# Patient Record
Sex: MALE | Race: WHITE | Employment: UNEMPLOYED | URBAN - METROPOLITAN AREA
[De-identification: names, ages, dates, MRNs, and addresses within clinical notes are randomized per-mention and may not be internally consistent; named-entity substitution may affect disease eponyms.]

---

## 2021-12-28 ENCOUNTER — HOSPITAL ENCOUNTER (OUTPATIENT)
Dept: LAB | Age: 76
Discharge: HOME | End: 2021-12-28
Payer: OTHER GOVERNMENT

## 2021-12-28 ENCOUNTER — TRANSCRIBE ORDER (OUTPATIENT)
Dept: REGISTRATION | Age: 76
End: 2021-12-28

## 2021-12-28 ENCOUNTER — HOSPITAL ENCOUNTER (OUTPATIENT)
Dept: NON INVASIVE DIAGNOSTICS | Age: 76
Discharge: HOME | End: 2021-12-28
Payer: OTHER GOVERNMENT

## 2021-12-28 DIAGNOSIS — D50.9 IRON DEFICIENCY ANEMIA, UNSPECIFIED: ICD-10-CM

## 2021-12-28 DIAGNOSIS — D50.9 IRON DEFICIENCY ANEMIA, UNSPECIFIED: Primary | ICD-10-CM

## 2021-12-28 DIAGNOSIS — I25.9 CHRONIC ISCHEMIC HEART DISEASE, UNSPECIFIED: ICD-10-CM

## 2021-12-28 DIAGNOSIS — I25.9 CHRONIC ISCHEMIC HEART DISEASE, UNSPECIFIED: Primary | ICD-10-CM

## 2021-12-28 LAB
ATRIAL RATE: 78 BPM
BASOPHILS # BLD: 0 K/UL (ref 0–0.1)
BASOPHILS NFR BLD: 1 % (ref 0–1)
CALCULATED P AXIS, ECG09: 10 DEGREES
CALCULATED R AXIS, ECG10: -39 DEGREES
CALCULATED T AXIS, ECG11: 13 DEGREES
DIAGNOSIS, 93000: NORMAL
DIFFERENTIAL METHOD BLD: NORMAL
EOSINOPHIL # BLD: 0.2 K/UL (ref 0–0.4)
EOSINOPHIL NFR BLD: 2 % (ref 0–7)
ERYTHROCYTE [DISTWIDTH] IN BLOOD BY AUTOMATED COUNT: 13.1 % (ref 11.5–14.5)
HCT VFR BLD AUTO: 44 % (ref 36.6–50.3)
HGB BLD-MCNC: 14.7 G/DL (ref 12.1–17)
IMM GRANULOCYTES # BLD AUTO: 0 K/UL (ref 0–0.04)
IMM GRANULOCYTES NFR BLD AUTO: 0 % (ref 0–0.5)
LYMPHOCYTES # BLD: 1.4 K/UL (ref 0.8–3.5)
LYMPHOCYTES NFR BLD: 17 % (ref 12–49)
MCH RBC QN AUTO: 32 PG (ref 26–34)
MCHC RBC AUTO-ENTMCNC: 33.4 G/DL (ref 30–36.5)
MCV RBC AUTO: 95.7 FL (ref 80–99)
MONOCYTES # BLD: 0.7 K/UL (ref 0–1)
MONOCYTES NFR BLD: 9 % (ref 5–13)
NEUTS SEG # BLD: 5.6 K/UL (ref 1.8–8)
NEUTS SEG NFR BLD: 71 % (ref 32–75)
NRBC # BLD: 0 K/UL (ref 0–0.01)
NRBC BLD-RTO: 0 PER 100 WBC
P-R INTERVAL, ECG05: 170 MS
PLATELET # BLD AUTO: 262 K/UL (ref 150–400)
PMV BLD AUTO: 10.9 FL (ref 8.9–12.9)
Q-T INTERVAL, ECG07: 402 MS
QRS DURATION, ECG06: 84 MS
QTC CALCULATION (BEZET), ECG08: 458 MS
RBC # BLD AUTO: 4.6 M/UL (ref 4.1–5.7)
VENTRICULAR RATE, ECG03: 78 BPM
WBC # BLD AUTO: 8 K/UL (ref 4.1–11.1)

## 2021-12-28 PROCEDURE — 36415 COLL VENOUS BLD VENIPUNCTURE: CPT

## 2021-12-28 PROCEDURE — 93005 ELECTROCARDIOGRAM TRACING: CPT

## 2021-12-28 PROCEDURE — 85025 COMPLETE CBC W/AUTO DIFF WBC: CPT

## 2024-12-09 ENCOUNTER — HOSPITAL ENCOUNTER (INPATIENT)
Facility: HOSPITAL | Age: 79
LOS: 3 days | Discharge: NURSING, REHAB OR LONG-TERM CARE | End: 2024-12-13
Attending: EMERGENCY MEDICINE | Admitting: STUDENT IN AN ORGANIZED HEALTH CARE EDUCATION/TRAINING PROGRAM
Payer: MEDICARE

## 2024-12-09 ENCOUNTER — APPOINTMENT (OUTPATIENT)
Facility: HOSPITAL | Age: 79
End: 2024-12-09
Payer: MEDICARE

## 2024-12-09 DIAGNOSIS — R07.9 CHEST PAIN, UNSPECIFIED TYPE: ICD-10-CM

## 2024-12-09 DIAGNOSIS — I95.9 HYPOTENSION, UNSPECIFIED HYPOTENSION TYPE: Primary | ICD-10-CM

## 2024-12-09 DIAGNOSIS — E86.0 DEHYDRATION: ICD-10-CM

## 2024-12-09 DIAGNOSIS — R10.13 ABDOMINAL PAIN, EPIGASTRIC: ICD-10-CM

## 2024-12-09 LAB
ALBUMIN SERPL-MCNC: 3 G/DL (ref 3.5–5)
ALBUMIN/GLOB SERPL: 0.9 (ref 1.1–2.2)
ALP SERPL-CCNC: 107 U/L (ref 45–117)
ALT SERPL-CCNC: 43 U/L (ref 12–78)
ANION GAP SERPL CALC-SCNC: 6 MMOL/L (ref 2–12)
APPEARANCE UR: CLEAR
AST SERPL-CCNC: 54 U/L (ref 15–37)
BACTERIA URNS QL MICRO: NEGATIVE /HPF
BASOPHILS # BLD: 0 K/UL (ref 0–0.1)
BASOPHILS NFR BLD: 1 % (ref 0–1)
BILIRUB SERPL-MCNC: 0.5 MG/DL (ref 0.2–1)
BILIRUB UR QL: NEGATIVE
BUN SERPL-MCNC: 22 MG/DL (ref 6–20)
BUN/CREAT SERPL: 26 (ref 12–20)
CALCIUM SERPL-MCNC: 8.6 MG/DL (ref 8.5–10.1)
CHLORIDE SERPL-SCNC: 102 MMOL/L (ref 97–108)
CO2 SERPL-SCNC: 32 MMOL/L (ref 21–32)
COLOR UR: NORMAL
CREAT SERPL-MCNC: 0.86 MG/DL (ref 0.7–1.3)
DIFFERENTIAL METHOD BLD: ABNORMAL
EOSINOPHIL # BLD: 0.2 K/UL (ref 0–0.4)
EOSINOPHIL NFR BLD: 3 % (ref 0–7)
EPITH CASTS URNS QL MICRO: NORMAL /LPF
ERYTHROCYTE [DISTWIDTH] IN BLOOD BY AUTOMATED COUNT: 14.8 % (ref 11.5–14.5)
GLOBULIN SER CALC-MCNC: 3.4 G/DL (ref 2–4)
GLUCOSE SERPL-MCNC: 93 MG/DL (ref 65–100)
GLUCOSE UR STRIP.AUTO-MCNC: NEGATIVE MG/DL
HCT VFR BLD AUTO: 34.3 % (ref 36.6–50.3)
HGB BLD-MCNC: 10.7 G/DL (ref 12.1–17)
HGB UR QL STRIP: NEGATIVE
HYALINE CASTS URNS QL MICRO: NORMAL /LPF (ref 0–2)
IMM GRANULOCYTES # BLD AUTO: 0 K/UL (ref 0–0.04)
IMM GRANULOCYTES NFR BLD AUTO: 0 % (ref 0–0.5)
KETONES UR QL STRIP.AUTO: NEGATIVE MG/DL
LACTATE BLD-SCNC: 1.63 MMOL/L (ref 0.4–2)
LEUKOCYTE ESTERASE UR QL STRIP.AUTO: NEGATIVE
LIPASE SERPL-CCNC: 16 U/L (ref 13–75)
LYMPHOCYTES # BLD: 1.5 K/UL (ref 0.8–3.5)
LYMPHOCYTES NFR BLD: 20 % (ref 12–49)
MCH RBC QN AUTO: 29.4 PG (ref 26–34)
MCHC RBC AUTO-ENTMCNC: 31.2 G/DL (ref 30–36.5)
MCV RBC AUTO: 94.2 FL (ref 80–99)
MONOCYTES # BLD: 0.8 K/UL (ref 0–1)
MONOCYTES NFR BLD: 11 % (ref 5–13)
NEUTS SEG # BLD: 4.8 K/UL (ref 1.8–8)
NEUTS SEG NFR BLD: 65 % (ref 32–75)
NITRITE UR QL STRIP.AUTO: NEGATIVE
NRBC # BLD: 0 K/UL (ref 0–0.01)
NRBC BLD-RTO: 0 PER 100 WBC
NT PRO BNP: 1290 PG/ML
PH UR STRIP: 6 (ref 5–8)
PLATELET # BLD AUTO: 317 K/UL (ref 150–400)
PMV BLD AUTO: 10.7 FL (ref 8.9–12.9)
POTASSIUM SERPL-SCNC: 3.2 MMOL/L (ref 3.5–5.1)
PROT SERPL-MCNC: 6.4 G/DL (ref 6.4–8.2)
PROT UR STRIP-MCNC: NEGATIVE MG/DL
RBC # BLD AUTO: 3.64 M/UL (ref 4.1–5.7)
RBC #/AREA URNS HPF: NORMAL /HPF (ref 0–5)
SODIUM SERPL-SCNC: 140 MMOL/L (ref 136–145)
SP GR UR REFRACTOMETRY: 1.01
TROPONIN I SERPL HS-MCNC: 33 NG/L (ref 0–76)
TROPONIN I SERPL HS-MCNC: 35 NG/L (ref 0–76)
URINE CULTURE IF INDICATED: NORMAL
UROBILINOGEN UR QL STRIP.AUTO: 1 EU/DL (ref 0.2–1)
WBC # BLD AUTO: 7.4 K/UL (ref 4.1–11.1)
WBC URNS QL MICRO: NORMAL /HPF (ref 0–4)

## 2024-12-09 PROCEDURE — 84484 ASSAY OF TROPONIN QUANT: CPT

## 2024-12-09 PROCEDURE — 83880 ASSAY OF NATRIURETIC PEPTIDE: CPT

## 2024-12-09 PROCEDURE — 74177 CT ABD & PELVIS W/CONTRAST: CPT

## 2024-12-09 PROCEDURE — 83690 ASSAY OF LIPASE: CPT

## 2024-12-09 PROCEDURE — 71045 X-RAY EXAM CHEST 1 VIEW: CPT

## 2024-12-09 PROCEDURE — 80053 COMPREHEN METABOLIC PANEL: CPT

## 2024-12-09 PROCEDURE — 2580000003 HC RX 258: Performed by: EMERGENCY MEDICINE

## 2024-12-09 PROCEDURE — 83605 ASSAY OF LACTIC ACID: CPT

## 2024-12-09 PROCEDURE — 99285 EMERGENCY DEPT VISIT HI MDM: CPT

## 2024-12-09 PROCEDURE — 36415 COLL VENOUS BLD VENIPUNCTURE: CPT

## 2024-12-09 PROCEDURE — 93005 ELECTROCARDIOGRAM TRACING: CPT | Performed by: EMERGENCY MEDICINE

## 2024-12-09 PROCEDURE — 6360000004 HC RX CONTRAST MEDICATION: Performed by: EMERGENCY MEDICINE

## 2024-12-09 PROCEDURE — 81001 URINALYSIS AUTO W/SCOPE: CPT

## 2024-12-09 PROCEDURE — 85025 COMPLETE CBC W/AUTO DIFF WBC: CPT

## 2024-12-09 RX ORDER — MIDODRINE HYDROCHLORIDE 5 MG/1
10 TABLET ORAL
Status: COMPLETED | OUTPATIENT
Start: 2024-12-09 | End: 2024-12-10

## 2024-12-09 RX ORDER — 0.9 % SODIUM CHLORIDE 0.9 %
1000 INTRAVENOUS SOLUTION INTRAVENOUS ONCE
Status: DISCONTINUED | OUTPATIENT
Start: 2024-12-09 | End: 2024-12-09

## 2024-12-09 RX ORDER — 0.9 % SODIUM CHLORIDE 0.9 %
500 INTRAVENOUS SOLUTION INTRAVENOUS ONCE
Status: COMPLETED | OUTPATIENT
Start: 2024-12-09 | End: 2024-12-09

## 2024-12-09 RX ORDER — 0.9 % SODIUM CHLORIDE 0.9 %
500 INTRAVENOUS SOLUTION INTRAVENOUS ONCE
Status: COMPLETED | OUTPATIENT
Start: 2024-12-09 | End: 2024-12-10

## 2024-12-09 RX ORDER — IOPAMIDOL 755 MG/ML
100 INJECTION, SOLUTION INTRAVASCULAR
Status: COMPLETED | OUTPATIENT
Start: 2024-12-09 | End: 2024-12-09

## 2024-12-09 RX ADMIN — IOPAMIDOL 100 ML: 755 INJECTION, SOLUTION INTRAVENOUS at 19:24

## 2024-12-09 RX ADMIN — SODIUM CHLORIDE 500 ML: 9 INJECTION, SOLUTION INTRAVENOUS at 18:19

## 2024-12-09 RX ADMIN — SODIUM CHLORIDE 500 ML: 9 INJECTION, SOLUTION INTRAVENOUS at 20:53

## 2024-12-09 ASSESSMENT — LIFESTYLE VARIABLES
HOW MANY STANDARD DRINKS CONTAINING ALCOHOL DO YOU HAVE ON A TYPICAL DAY: PATIENT DOES NOT DRINK
HOW OFTEN DO YOU HAVE A DRINK CONTAINING ALCOHOL: NEVER

## 2024-12-10 PROBLEM — I95.9 HYPOTENSION: Status: ACTIVE | Noted: 2024-12-10

## 2024-12-10 LAB
EKG ATRIAL RATE: 78 BPM
EKG DIAGNOSIS: NORMAL
EKG P AXIS: 70 DEGREES
EKG P-R INTERVAL: 178 MS
EKG Q-T INTERVAL: 454 MS
EKG QRS DURATION: 104 MS
EKG QTC CALCULATION (BAZETT): 517 MS
EKG R AXIS: -58 DEGREES
EKG T AXIS: -7 DEGREES
EKG VENTRICULAR RATE: 78 BPM

## 2024-12-10 PROCEDURE — 6360000002 HC RX W HCPCS: Performed by: STUDENT IN AN ORGANIZED HEALTH CARE EDUCATION/TRAINING PROGRAM

## 2024-12-10 PROCEDURE — 1100000003 HC PRIVATE W/ TELEMETRY

## 2024-12-10 PROCEDURE — 2580000003 HC RX 258

## 2024-12-10 PROCEDURE — 2580000003 HC RX 258: Performed by: STUDENT IN AN ORGANIZED HEALTH CARE EDUCATION/TRAINING PROGRAM

## 2024-12-10 PROCEDURE — 6370000000 HC RX 637 (ALT 250 FOR IP)

## 2024-12-10 PROCEDURE — 94761 N-INVAS EAR/PLS OXIMETRY MLT: CPT

## 2024-12-10 PROCEDURE — 6370000000 HC RX 637 (ALT 250 FOR IP): Performed by: STUDENT IN AN ORGANIZED HEALTH CARE EDUCATION/TRAINING PROGRAM

## 2024-12-10 PROCEDURE — 94640 AIRWAY INHALATION TREATMENT: CPT

## 2024-12-10 RX ORDER — ZINC GLUCONATE 50 MG
50 TABLET ORAL DAILY
COMMUNITY

## 2024-12-10 RX ORDER — ACETAMINOPHEN 650 MG/1
650 SUPPOSITORY RECTAL EVERY 6 HOURS PRN
Status: DISCONTINUED | OUTPATIENT
Start: 2024-12-10 | End: 2024-12-13 | Stop reason: HOSPADM

## 2024-12-10 RX ORDER — ASPIRIN 81 MG/1
81 TABLET, CHEWABLE ORAL DAILY
Status: DISCONTINUED | OUTPATIENT
Start: 2024-12-10 | End: 2024-12-13 | Stop reason: HOSPADM

## 2024-12-10 RX ORDER — ASCORBIC ACID 500 MG
500 TABLET ORAL DAILY
Status: DISCONTINUED | OUTPATIENT
Start: 2024-12-10 | End: 2024-12-13 | Stop reason: HOSPADM

## 2024-12-10 RX ORDER — DONEPEZIL HYDROCHLORIDE 10 MG/1
10 TABLET, FILM COATED ORAL DAILY
COMMUNITY

## 2024-12-10 RX ORDER — PANTOPRAZOLE SODIUM 40 MG/1
40 TABLET, DELAYED RELEASE ORAL
Status: DISCONTINUED | OUTPATIENT
Start: 2024-12-11 | End: 2024-12-13 | Stop reason: HOSPADM

## 2024-12-10 RX ORDER — HYDROXYZINE HYDROCHLORIDE 25 MG/1
25 TABLET, FILM COATED ORAL EVERY 8 HOURS PRN
Status: DISCONTINUED | OUTPATIENT
Start: 2024-12-10 | End: 2024-12-13 | Stop reason: HOSPADM

## 2024-12-10 RX ORDER — SERTRALINE HYDROCHLORIDE 100 MG/1
50 TABLET, FILM COATED ORAL DAILY
COMMUNITY

## 2024-12-10 RX ORDER — ENOXAPARIN SODIUM 100 MG/ML
30 INJECTION SUBCUTANEOUS 2 TIMES DAILY
Status: DISCONTINUED | OUTPATIENT
Start: 2024-12-10 | End: 2024-12-13 | Stop reason: HOSPADM

## 2024-12-10 RX ORDER — MULTIVITAMIN WITH IRON
1 TABLET ORAL DAILY
Status: DISCONTINUED | OUTPATIENT
Start: 2024-12-11 | End: 2024-12-13 | Stop reason: HOSPADM

## 2024-12-10 RX ORDER — CALCIUM CARBONATE 500 MG/1
1 TABLET, CHEWABLE ORAL DAILY PRN
Status: DISCONTINUED | OUTPATIENT
Start: 2024-12-10 | End: 2024-12-13 | Stop reason: HOSPADM

## 2024-12-10 RX ORDER — ACETAMINOPHEN 325 MG/1
650 TABLET ORAL EVERY 6 HOURS PRN
Status: DISCONTINUED | OUTPATIENT
Start: 2024-12-10 | End: 2024-12-13 | Stop reason: HOSPADM

## 2024-12-10 RX ORDER — ONDANSETRON 2 MG/ML
4 INJECTION INTRAMUSCULAR; INTRAVENOUS EVERY 6 HOURS PRN
Status: DISCONTINUED | OUTPATIENT
Start: 2024-12-10 | End: 2024-12-13 | Stop reason: HOSPADM

## 2024-12-10 RX ORDER — ASCORBIC ACID 500 MG
500 TABLET ORAL DAILY
COMMUNITY

## 2024-12-10 RX ORDER — DIVALPROEX SODIUM 125 MG/1
125 CAPSULE, COATED PELLETS ORAL 2 TIMES DAILY
Status: DISCONTINUED | OUTPATIENT
Start: 2024-12-10 | End: 2024-12-13 | Stop reason: HOSPADM

## 2024-12-10 RX ORDER — RISPERIDONE 0.25 MG/1
0.25 TABLET ORAL DAILY
Status: DISCONTINUED | OUTPATIENT
Start: 2024-12-11 | End: 2024-12-13 | Stop reason: HOSPADM

## 2024-12-10 RX ORDER — BUDESONIDE AND FORMOTEROL FUMARATE DIHYDRATE 160; 4.5 UG/1; UG/1
2 AEROSOL RESPIRATORY (INHALATION) 2 TIMES DAILY
Status: ON HOLD | COMMUNITY
End: 2024-12-10 | Stop reason: ALTCHOICE

## 2024-12-10 RX ORDER — OLANZAPINE 5 MG/1
2.5 TABLET ORAL NIGHTLY
Status: DISCONTINUED | OUTPATIENT
Start: 2024-12-10 | End: 2024-12-13 | Stop reason: HOSPADM

## 2024-12-10 RX ORDER — B-COMPLEX WITH VITAMIN C
1 TABLET ORAL 2 TIMES DAILY
COMMUNITY

## 2024-12-10 RX ORDER — SODIUM CHLORIDE 0.9 % (FLUSH) 0.9 %
5-40 SYRINGE (ML) INJECTION PRN
Status: DISCONTINUED | OUTPATIENT
Start: 2024-12-10 | End: 2024-12-13 | Stop reason: HOSPADM

## 2024-12-10 RX ORDER — SODIUM CHLORIDE 0.9 % (FLUSH) 0.9 %
5-40 SYRINGE (ML) INJECTION EVERY 12 HOURS SCHEDULED
Status: DISCONTINUED | OUTPATIENT
Start: 2024-12-10 | End: 2024-12-13 | Stop reason: HOSPADM

## 2024-12-10 RX ORDER — POTASSIUM CHLORIDE 750 MG/1
30 CAPSULE, EXTENDED RELEASE ORAL 2 TIMES DAILY
Status: ON HOLD | COMMUNITY
End: 2024-12-13 | Stop reason: HOSPADM

## 2024-12-10 RX ORDER — EZETIMIBE 10 MG/1
5 TABLET ORAL DAILY
Status: ON HOLD | COMMUNITY
End: 2024-12-10 | Stop reason: ALTCHOICE

## 2024-12-10 RX ORDER — ATORVASTATIN CALCIUM 40 MG/1
80 TABLET, FILM COATED ORAL NIGHTLY
Status: DISCONTINUED | OUTPATIENT
Start: 2024-12-10 | End: 2024-12-10

## 2024-12-10 RX ORDER — MULTIVITAMIN WITH IRON
1 TABLET ORAL DAILY
COMMUNITY

## 2024-12-10 RX ORDER — DIVALPROEX SODIUM 125 MG/1
125 CAPSULE, COATED PELLETS ORAL 2 TIMES DAILY
COMMUNITY
Start: 2024-06-21

## 2024-12-10 RX ORDER — EZETIMIBE 10 MG/1
5 TABLET ORAL DAILY
Status: DISCONTINUED | OUTPATIENT
Start: 2024-12-10 | End: 2024-12-10

## 2024-12-10 RX ORDER — CALCIUM CARBONATE 500 MG/1
1 TABLET, CHEWABLE ORAL 2 TIMES DAILY
COMMUNITY

## 2024-12-10 RX ORDER — RISPERIDONE 0.5 MG/1
0.25 TABLET ORAL DAILY
COMMUNITY

## 2024-12-10 RX ORDER — OMEPRAZOLE 10 MG/1
20 CAPSULE, DELAYED RELEASE ORAL DAILY
COMMUNITY

## 2024-12-10 RX ORDER — SODIUM CHLORIDE 9 MG/ML
INJECTION, SOLUTION INTRAVENOUS PRN
Status: DISCONTINUED | OUTPATIENT
Start: 2024-12-10 | End: 2024-12-13 | Stop reason: HOSPADM

## 2024-12-10 RX ORDER — HYDROXYZINE HYDROCHLORIDE 25 MG/1
25 TABLET, FILM COATED ORAL EVERY 8 HOURS PRN
COMMUNITY
Start: 2024-06-21

## 2024-12-10 RX ORDER — CASTOR OIL AND BALSAM, PERU 788; 87 MG/G; MG/G
OINTMENT TOPICAL 2 TIMES DAILY
Status: DISCONTINUED | OUTPATIENT
Start: 2024-12-10 | End: 2024-12-13

## 2024-12-10 RX ORDER — ATORVASTATIN CALCIUM 80 MG/1
80 TABLET, FILM COATED ORAL NIGHTLY
Status: ON HOLD | COMMUNITY
End: 2024-12-10 | Stop reason: ALTCHOICE

## 2024-12-10 RX ORDER — MIDODRINE HYDROCHLORIDE 5 MG/1
5 TABLET ORAL
Status: DISCONTINUED | OUTPATIENT
Start: 2024-12-10 | End: 2024-12-13 | Stop reason: HOSPADM

## 2024-12-10 RX ORDER — ASPIRIN 81 MG/1
81 TABLET, CHEWABLE ORAL DAILY
COMMUNITY

## 2024-12-10 RX ORDER — TORSEMIDE 20 MG/1
40 TABLET ORAL DAILY
Status: ON HOLD | COMMUNITY
Start: 2024-07-29 | End: 2024-12-13 | Stop reason: HOSPADM

## 2024-12-10 RX ORDER — OLANZAPINE 5 MG/1
2.5 TABLET ORAL NIGHTLY
COMMUNITY
Start: 2024-10-02

## 2024-12-10 RX ORDER — DONEPEZIL HYDROCHLORIDE 5 MG/1
10 TABLET, FILM COATED ORAL DAILY
Status: DISCONTINUED | OUTPATIENT
Start: 2024-12-11 | End: 2024-12-13 | Stop reason: HOSPADM

## 2024-12-10 RX ADMIN — Medication 1 TABLET: at 09:04

## 2024-12-10 RX ADMIN — Medication 1 TABLET: at 22:34

## 2024-12-10 RX ADMIN — WATER 5 MG: 1 INJECTION INTRAMUSCULAR; INTRAVENOUS; SUBCUTANEOUS at 22:57

## 2024-12-10 RX ADMIN — MIDODRINE HYDROCHLORIDE 10 MG: 5 TABLET ORAL at 01:16

## 2024-12-10 RX ADMIN — ARFORMOTEROL TARTRATE: 15 SOLUTION RESPIRATORY (INHALATION) at 07:37

## 2024-12-10 RX ADMIN — DIVALPROEX SODIUM 125 MG: 125 CAPSULE ORAL at 09:04

## 2024-12-10 RX ADMIN — OXYCODONE HYDROCHLORIDE AND ACETAMINOPHEN 500 MG: 500 TABLET ORAL at 09:04

## 2024-12-10 RX ADMIN — ENOXAPARIN SODIUM 30 MG: 100 INJECTION SUBCUTANEOUS at 09:04

## 2024-12-10 RX ADMIN — SODIUM CHLORIDE, PRESERVATIVE FREE 10 ML: 5 INJECTION INTRAVENOUS at 22:12

## 2024-12-10 RX ADMIN — ENOXAPARIN SODIUM 30 MG: 100 INJECTION SUBCUTANEOUS at 22:12

## 2024-12-10 RX ADMIN — MIDODRINE HYDROCHLORIDE 5 MG: 5 TABLET ORAL at 16:46

## 2024-12-10 RX ADMIN — DIVALPROEX SODIUM 125 MG: 125 CAPSULE ORAL at 22:35

## 2024-12-10 RX ADMIN — MIDODRINE HYDROCHLORIDE 5 MG: 5 TABLET ORAL at 12:54

## 2024-12-10 RX ADMIN — ASPIRIN 81 MG: 81 TABLET, CHEWABLE ORAL at 09:04

## 2024-12-10 RX ADMIN — Medication: at 22:14

## 2024-12-10 RX ADMIN — ONDANSETRON 4 MG: 2 INJECTION INTRAMUSCULAR; INTRAVENOUS at 10:33

## 2024-12-10 RX ADMIN — Medication: at 08:57

## 2024-12-10 RX ADMIN — SODIUM CHLORIDE 500 ML: 9 INJECTION, SOLUTION INTRAVENOUS at 01:15

## 2024-12-10 RX ADMIN — ACETAMINOPHEN 650 MG: 325 TABLET ORAL at 22:10

## 2024-12-10 RX ADMIN — EZETIMIBE 5 MG: 10 TABLET ORAL at 09:04

## 2024-12-10 RX ADMIN — OLANZAPINE 2.5 MG: 5 TABLET, FILM COATED ORAL at 22:11

## 2024-12-10 RX ADMIN — SODIUM CHLORIDE, PRESERVATIVE FREE 10 ML: 5 INJECTION INTRAVENOUS at 08:58

## 2024-12-10 NOTE — ED NOTES
Handoff report received from CARMITA Hernandez. Introduced self to pt and gave opportunity to ask questions. Pt alert, conversational, and in no acute distress. Opportunity was given to ask questions. Side rails x 2, call light within reach, bed locked and in lowest position.

## 2024-12-10 NOTE — DISCHARGE INSTRUCTIONS
Mr Cruz was seen in the emergency department for chest pain and epigastric abdominal pain.  His workup is unremarkable including CT scan of the abdomen, lab work, and chest x-ray.  He is likely mildly dehydrated and received IV fluids.  Please monitor his blood pressure and if it is low please return to the emergency department, if he is having any worsening abdominal pain, chest pain, nausea, vomiting, or fevers please return to the emergency department.  He does have gallstones for which he can follow-up with general surgery if he is having any recurrent abdominal pain, nausea or vomiting.

## 2024-12-10 NOTE — PROGRESS NOTES
Patient seen and examined this morning.  Alert and oriented to self and location only.  Pleasantly demented and not really sure why he is here.  Feeling fine.  Discussed with nurse patient is bradycardic with softer blood pressures.  Midodrine x 1 ordered in ED.  Midodrine 5 mg p.o. 3 times daily scheduled.  Otherwise agree with overnight H&P.  Pharmacy updated medications list, appropriate changes made.  Nonbillable note.

## 2024-12-10 NOTE — PROGRESS NOTES
Pharmacy Medication Reconciliation     The patient was interviewed regarding current PTA medication list, use and drug allergies and he didn't know all of his meds   Allergy Update: Patient has no known allergies.    Recommendations/Findings:   The following amendments were made to the patient's active medication list on file at [facility]:   1) Additions: MVI, Tums, donepezil, omeprazole, Kcl, risperidone, permethrin external cream, zinc tablet, sertraline,     2) Deletions: atorvastatin, Symbicort, vitamin D, ezetimibe,     3) Changes: none      Pertinent Findings: none    Clarified PTA med list with [facility] medication list and his wife. PTA medication list was corrected to the following:     Prior to Admission Medications   Prescriptions Last Dose Informant   Calcium Carbonate-Vitamin D (OYSTER SHELL CALCIUM/D) 500-5 MG-MCG TABS     Sig: Take 1 tablet by mouth 2 times daily   Multiple Vitamin (MULTIVITAMIN) TABS tablet     Sig: Take 1 tablet by mouth daily   OLANZapine (ZYPREXA) 5 MG tablet     Sig: Take 0.5 tablets by mouth nightly   ascorbic acid (VITAMIN C) 500 MG tablet     Sig: Take 1 tablet by mouth daily   aspirin 81 MG chewable tablet     Sig: Take 1 tablet by mouth daily   calcium carbonate (TUMS) 500 MG chewable tablet     Sig: Take 1 tablet by mouth 2 times daily   divalproex (DEPAKOTE SPRINKLE) 125 MG DR capsule     Sig: Take 1 capsule by mouth 2 times daily   donepezil (ARICEPT) 10 MG tablet     Sig: Take 1 tablet by mouth daily   hydrOXYzine HCl (ATARAX) 25 MG tablet     Sig: Take 1 tablet by mouth every 8 hours as needed   omeprazole (PRILOSEC) 10 MG delayed release capsule     Sig: Take 2 capsules by mouth daily   potassium chloride (MICRO-K) 10 MEQ extended release capsule     Sig: Take 3 capsules by mouth 2 times daily Hold if torsemide is held   pyrethrins-piperonyl butoxide 0.5 % bottle     Sig: Apply topically nightly Apply to complete body topically at bed time   risperiDONE (RISPERDAL) 0.5

## 2024-12-10 NOTE — PROGRESS NOTES
CRITICAL CARE PROGRESS NOTE      Name: Josue Cruz   : 1945   MRN: [id number]   Date: 2024            BRIEF PATIENT SUMMARY   78 y/o male Pmhx of dementia who presented to ED with intermittent epigastric abdominal pain and chest pain for past 2-3 days.  ED evaluation afebrile, initial V/S stable.  Troponin negative x 2, lab work unremarkable, CT A/P no acute findings, cholelithiasis, concern for symptomatic biliary colic.  His P/P trended down 90/50 he was administered 500 cc IV fluids with SBP in the 80s-90s.  Asymptomatic.  ICU consulted for evaluation for level of care.    On exam patient strong radial pulses, skin warm/dry, awake, oriented x 2, pleasantly confused.  SBP's ranging 80-90s, maps in the low 60s.  Have discussed with the ED physician and agree with starting scheduled midodrine as well as administering additional IV crystalloids.  At this time would not place patient on vasopressors.  Recommend admission to hospital medicine.  If further issues/concerns or critical change in patient's condition please reach out to ICU for potential transfer.        [facility]  Critical Care Medicine  [facility]

## 2024-12-10 NOTE — ED PROVIDER NOTES
[facility] 1 ORTHOPEDICS  EMERGENCY DEPARTMENT ENCOUNTER       Pt Name: Josue Cruz  MRN: [id number]  Birthdate 1945  Date of evaluation: 12/9/2024  Provider: Valdez Lucas MD   PCP: No primary care provider on file.  Note Started: 10:12 AM 12/9/24     CHIEF COMPLAINT       Chief Complaint   Patient presents with    Chest Pain     Pt arrives via EMS from [facility] (dementia unit) w CC of mid-sternal sharp 8/10 cp unknown onset. PTA EMS gave x1 nitro, pt reports some relief but still 8/10 pain.        HISTORY OF PRESENT ILLNESS: 1 or more elements      History From: Patient, History limited by: No limitations     Josue Cruz is a 79 y.o. male with history of dementia who presents with chief complaint of chest pain epigastric abdominal pain.  Symptoms have been intermittent over the past 2 to 3 days.  Associated with gas pains, belching, bloating sensation with associated nausea and vomiting.  No radiation of pain.  No aggravating or alleviating factors.     Nursing Notes were all reviewed and agreed with or any disagreements were addressed in the HPI.     REVIEW OF SYSTEMS        Positives and Pertinent negatives as per HPI.    PAST HISTORY     Past Medical History:  No past medical history on file.    Past Surgical History:  No past surgical history on file.    Family History:  No family history on file.    Social History:       Allergies:  No Known Allergies    CURRENT MEDICATIONS      Current Discharge Medication List        CONTINUE these medications which have NOT CHANGED    Details   torsemide (DEMADEX) 20 MG tablet Take 2 tablets by mouth daily      OLANZapine (ZYPREXA) 5 MG tablet Take 0.5 tablets by mouth nightly      hydrOXYzine HCl (ATARAX) 25 MG tablet Take 1 tablet by mouth every 8 hours as needed      divalproex (DEPAKOTE SPRINKLE) 125 MG DR capsule Take 1 capsule by mouth 2 times daily      ezetimibe (ZETIA) 10 MG tablet Take 0.5 tablets by mouth daily      Calcium Carbonate-Vitamin D    enoxaparin Sodium (LOVENOX) injection 30 mg (30 mg SubCUTAneous Given 12/10/24 0904)   ondansetron (ZOFRAN) injection 4 mg (has no administration in time range)   acetaminophen (TYLENOL) tablet 650 mg (has no administration in time range)     Or   acetaminophen (TYLENOL) suppository 650 mg (has no administration in time range)   balsum peru-castor oil (VENELEX) ointment ( Topical Given 12/10/24 0857)   sodium chloride 0.9 % bolus 500 mL (0 mLs IntraVENous Stopped 12/9/24 1908)   iopamidol (ISOVUE-370) 76 % injection 100 mL (100 mLs IntraVENous Given 12/9/24 1924)   sodium chloride 0.9 % bolus 500 mL (0 mLs IntraVENous Stopped 12/10/24 0610)   sodium chloride 0.9 % bolus 500 mL (0 mLs IntraVENous Stopped 12/10/24 0610)   midodrine (PROAMATINE) tablet 10 mg (10 mg Oral Given 12/10/24 0116)       Medical Decision Making  Amount and/or Complexity of Data Reviewed  Independent Historian: EMS  Labs: ordered. Decision-making details documented in ED Course.  Radiology: ordered. Decision-making details documented in ED Course.  ECG/medicine tests: ordered and independent interpretation performed. Decision-making details documented in ED Course.  Discussion of management or test interpretation with external provider(s): Dr De La Paz, Hospitalist    Risk  Prescription drug management.  Drug therapy requiring intensive monitoring for toxicity.  Decision regarding hospitalization.    Critical Care  Total time providing critical care: 55 minutes      79-year-old male with history of dementia presenting to the emergency department with intermittent epigastric abdominal pain and chest pain over the past 2 to 3 days.  He is afebrile and vital signs are stable.  Appears clinically well and nontoxic.  He is pleasant and demented and not a reliable historian.  His symptoms are associated with belching, bloating, nausea and vomiting, consider GI etiology such as gastritis, GERD, esophagitis, consider ACS given his age.  Will

## 2024-12-10 NOTE — H&P
Hospitalist Admission Note    NAME:Josue Cruz   : 1945   MRN: [id number]     Date/Time: 12/10/2024 3:38 AM    Patient PCP: No primary care provider on file.    *Please be aware this note is formulated with assistance from Dragon voice-recognition dictation software. Please excuse any errors that may be present*    ______________________________________________________________________  Given the patient's current clinical presentation, I have a high level of concern for decompensation if discharged from the emergency department.  Complex decision making was performed, which includes reviewing the patient's available past medical records, laboratory results, and x-ray films.       My assessment of this patient's clinical condition and my plan of care is as follows.    Problem List:  Patient Active Problem List   Diagnosis    Hypotension         Assessment / Plan:    Hypotension  Hx of severe aortic stenosis with systolic HF (per reports EF 45-50%)   - Unclear etiology, possibly hypovolemia secondary to home diuretic use   - Patient with hx of dementia, unable to verify history or medications  - S/p IVF in the ED, also received dose of midodrine   - [facility] did note hx of severe [facility] stenosis per note in    - Repeat orthostatics in AM   - Add scheduled midodrine if having persistent hypotension   - TTE ordered     Hx of delirium  Hx of dementia  - Noted history of dementia, also noted during prior hospitalizations - admission [facility] 2023 c/b hospital delirium  - Outside med list showing zyprexa, atarax, depakote - will continue for now and place pharmacy consult to confirm    CAD  -Continue ASA    Hx of asthma  - Continue advair       Medical Decision Making:   I personally reviewed labs: CBC, CMP  I personally reviewed imaging: CXR, CT a/p  I personally reviewed EKG: N/A  Toxic drug monitoring: N/A    Discussed case with: ED provider. After discussion I am in agreement that acuity of patient's medical

## 2024-12-10 NOTE — ED NOTES
Handoff report given to CARMITA Suarez at bedside. Gave pt opportunity to ask questions. Pt alert, conversational, and in no acute distress. Bed locked and in lowest position, side rails x 2, and call light within reach.

## 2024-12-10 NOTE — PROGRESS NOTES
End of Shift Note    Bedside shift change report given to Bridget MCBRIDE RN (oncoming nurse) by Allison Day RN (offgoing nurse).  Report included the following information SBAR, Kardex, MAR, Recent Results, and Cardiac Rhythm NSR-SB, 1degree AVB, PVC    Shift worked:  8811-9169     Shift summary and any significant changes:     Arrived to unit at 0638.      Concerns for physician to address:  Still hypotensive     Zone phone for oncoming shift:   0925       Activity:     Number times ambulated in hallways past shift: 0  Number of times OOB to chair past shift: 0    Cardiac:   Cardiac Monitoring: Yes      Cardiac Rhythm: Sinus rhythm-sinus lizz, 1st degree AVB, PVC    Access:  Current line(s): PIV     Genitourinary:   Urinary Status: Incontinent briefs, External catheter    Respiratory:   O2 Device: None (Room air)  Chronic home O2 use?: NO  Incentive spirometer at bedside: N/A    GI:     Current diet:  ADULT DIET; Regular  Passing flatus: NO    Pain Management:   Patient states pain is manageable on current regimen: N/A    Skin:     Interventions:      Patient Safety:  Fall Risk:         Active Consults:   IP CONSULT TO PHARMACY    Length of Stay:  Expected LOS: 3  Actual LOS: 0    Allison Day RN

## 2024-12-11 ENCOUNTER — APPOINTMENT (OUTPATIENT)
Facility: HOSPITAL | Age: 79
End: 2024-12-11
Attending: INTERNAL MEDICINE
Payer: MEDICARE

## 2024-12-11 LAB
ALBUMIN SERPL-MCNC: 2.4 G/DL (ref 3.5–5)
ALBUMIN/GLOB SERPL: 0.8 (ref 1.1–2.2)
ALP SERPL-CCNC: 94 U/L (ref 45–117)
ALT SERPL-CCNC: 29 U/L (ref 12–78)
ANION GAP SERPL CALC-SCNC: 3 MMOL/L (ref 2–12)
AST SERPL-CCNC: 25 U/L (ref 15–37)
BASOPHILS # BLD: 0 K/UL (ref 0–0.1)
BASOPHILS NFR BLD: 1 % (ref 0–1)
BILIRUB SERPL-MCNC: 0.4 MG/DL (ref 0.2–1)
BUN SERPL-MCNC: 18 MG/DL (ref 6–20)
BUN/CREAT SERPL: 21 (ref 12–20)
CALCIUM SERPL-MCNC: 8.6 MG/DL (ref 8.5–10.1)
CHLORIDE SERPL-SCNC: 107 MMOL/L (ref 97–108)
CO2 SERPL-SCNC: 32 MMOL/L (ref 21–32)
CREAT SERPL-MCNC: 0.85 MG/DL (ref 0.7–1.3)
DIFFERENTIAL METHOD BLD: ABNORMAL
ECHO AV MEAN GRADIENT: 22 MMHG
ECHO BSA: 2.29 M2
ECHO LV EF PHYSICIAN: 55 %
EOSINOPHIL # BLD: 0.5 K/UL (ref 0–0.4)
EOSINOPHIL NFR BLD: 8 % (ref 0–7)
ERYTHROCYTE [DISTWIDTH] IN BLOOD BY AUTOMATED COUNT: 15.1 % (ref 11.5–14.5)
GLOBULIN SER CALC-MCNC: 3.2 G/DL (ref 2–4)
GLUCOSE SERPL-MCNC: 84 MG/DL (ref 65–100)
HCT VFR BLD AUTO: 32.2 % (ref 36.6–50.3)
HGB BLD-MCNC: 10 G/DL (ref 12.1–17)
IMM GRANULOCYTES # BLD AUTO: 0 K/UL (ref 0–0.04)
IMM GRANULOCYTES NFR BLD AUTO: 1 % (ref 0–0.5)
LYMPHOCYTES # BLD: 1.7 K/UL (ref 0.8–3.5)
LYMPHOCYTES NFR BLD: 28 % (ref 12–49)
MAGNESIUM SERPL-MCNC: 2 MG/DL (ref 1.6–2.4)
MCH RBC QN AUTO: 29.7 PG (ref 26–34)
MCHC RBC AUTO-ENTMCNC: 31.1 G/DL (ref 30–36.5)
MCV RBC AUTO: 95.5 FL (ref 80–99)
MONOCYTES # BLD: 0.6 K/UL (ref 0–1)
MONOCYTES NFR BLD: 10 % (ref 5–13)
NEUTS SEG # BLD: 3.2 K/UL (ref 1.8–8)
NEUTS SEG NFR BLD: 52 % (ref 32–75)
NRBC # BLD: 0 K/UL (ref 0–0.01)
NRBC BLD-RTO: 0 PER 100 WBC
PLATELET # BLD AUTO: 273 K/UL (ref 150–400)
PMV BLD AUTO: 10.5 FL (ref 8.9–12.9)
POTASSIUM SERPL-SCNC: 2.8 MMOL/L (ref 3.5–5.1)
PROT SERPL-MCNC: 5.6 G/DL (ref 6.4–8.2)
RBC # BLD AUTO: 3.37 M/UL (ref 4.1–5.7)
SODIUM SERPL-SCNC: 142 MMOL/L (ref 136–145)
WBC # BLD AUTO: 6.1 K/UL (ref 4.1–11.1)

## 2024-12-11 PROCEDURE — 76937 US GUIDE VASCULAR ACCESS: CPT

## 2024-12-11 PROCEDURE — 6360000002 HC RX W HCPCS: Performed by: INTERNAL MEDICINE

## 2024-12-11 PROCEDURE — 2580000003 HC RX 258: Performed by: STUDENT IN AN ORGANIZED HEALTH CARE EDUCATION/TRAINING PROGRAM

## 2024-12-11 PROCEDURE — 80053 COMPREHEN METABOLIC PANEL: CPT

## 2024-12-11 PROCEDURE — 6370000000 HC RX 637 (ALT 250 FOR IP): Performed by: STUDENT IN AN ORGANIZED HEALTH CARE EDUCATION/TRAINING PROGRAM

## 2024-12-11 PROCEDURE — 6360000002 HC RX W HCPCS: Performed by: STUDENT IN AN ORGANIZED HEALTH CARE EDUCATION/TRAINING PROGRAM

## 2024-12-11 PROCEDURE — 83735 ASSAY OF MAGNESIUM: CPT

## 2024-12-11 PROCEDURE — 36415 COLL VENOUS BLD VENIPUNCTURE: CPT

## 2024-12-11 PROCEDURE — 1100000003 HC PRIVATE W/ TELEMETRY

## 2024-12-11 PROCEDURE — 93308 TTE F-UP OR LMTD: CPT

## 2024-12-11 PROCEDURE — P9047 ALBUMIN (HUMAN), 25%, 50ML: HCPCS | Performed by: INTERNAL MEDICINE

## 2024-12-11 PROCEDURE — 6370000000 HC RX 637 (ALT 250 FOR IP): Performed by: INTERNAL MEDICINE

## 2024-12-11 PROCEDURE — 85025 COMPLETE CBC W/AUTO DIFF WBC: CPT

## 2024-12-11 RX ORDER — LORAZEPAM 0.5 MG/1
1 TABLET ORAL ONCE
Status: COMPLETED | OUTPATIENT
Start: 2024-12-11 | End: 2024-12-11

## 2024-12-11 RX ORDER — POTASSIUM CHLORIDE 1500 MG/1
40 TABLET, EXTENDED RELEASE ORAL
Status: COMPLETED | OUTPATIENT
Start: 2024-12-11 | End: 2024-12-11

## 2024-12-11 RX ORDER — POTASSIUM CHLORIDE 1500 MG/1
40 TABLET, EXTENDED RELEASE ORAL
Status: DISPENSED | OUTPATIENT
Start: 2024-12-11 | End: 2024-12-11

## 2024-12-11 RX ORDER — POTASSIUM CHLORIDE 7.45 MG/ML
10 INJECTION INTRAVENOUS
Status: COMPLETED | OUTPATIENT
Start: 2024-12-11 | End: 2024-12-11

## 2024-12-11 RX ORDER — POTASSIUM CHLORIDE 7.45 MG/ML
10 INJECTION INTRAVENOUS
Status: DISPENSED | OUTPATIENT
Start: 2024-12-11 | End: 2024-12-11

## 2024-12-11 RX ORDER — ALBUMIN (HUMAN) 12.5 G/50ML
25 SOLUTION INTRAVENOUS ONCE
Status: COMPLETED | OUTPATIENT
Start: 2024-12-11 | End: 2024-12-11

## 2024-12-11 RX ADMIN — MIDODRINE HYDROCHLORIDE 5 MG: 5 TABLET ORAL at 12:31

## 2024-12-11 RX ADMIN — MIDODRINE HYDROCHLORIDE 5 MG: 5 TABLET ORAL at 08:35

## 2024-12-11 RX ADMIN — POTASSIUM CHLORIDE 10 MEQ: 7.46 INJECTION, SOLUTION INTRAVENOUS at 12:41

## 2024-12-11 RX ADMIN — ENOXAPARIN SODIUM 30 MG: 100 INJECTION SUBCUTANEOUS at 08:34

## 2024-12-11 RX ADMIN — SODIUM CHLORIDE, PRESERVATIVE FREE 10 ML: 5 INJECTION INTRAVENOUS at 21:16

## 2024-12-11 RX ADMIN — THERA TABS 1 TABLET: TAB at 08:35

## 2024-12-11 RX ADMIN — POTASSIUM CHLORIDE 40 MEQ: 1500 TABLET, EXTENDED RELEASE ORAL at 12:43

## 2024-12-11 RX ADMIN — PANTOPRAZOLE SODIUM 40 MG: 40 TABLET, DELAYED RELEASE ORAL at 08:35

## 2024-12-11 RX ADMIN — DONEPEZIL HYDROCHLORIDE 10 MG: 5 TABLET, FILM COATED ORAL at 08:34

## 2024-12-11 RX ADMIN — MIDODRINE HYDROCHLORIDE 5 MG: 5 TABLET ORAL at 17:28

## 2024-12-11 RX ADMIN — OLANZAPINE 2.5 MG: 5 TABLET, FILM COATED ORAL at 21:11

## 2024-12-11 RX ADMIN — DIVALPROEX SODIUM 125 MG: 125 CAPSULE ORAL at 08:34

## 2024-12-11 RX ADMIN — OXYCODONE HYDROCHLORIDE AND ACETAMINOPHEN 500 MG: 500 TABLET ORAL at 08:35

## 2024-12-11 RX ADMIN — Medication: at 08:38

## 2024-12-11 RX ADMIN — RISPERIDONE 0.25 MG: 0.25 TABLET, FILM COATED ORAL at 08:34

## 2024-12-11 RX ADMIN — SERTRALINE HYDROCHLORIDE 50 MG: 50 TABLET ORAL at 08:35

## 2024-12-11 RX ADMIN — POTASSIUM CHLORIDE 10 MEQ: 7.46 INJECTION, SOLUTION INTRAVENOUS at 13:55

## 2024-12-11 RX ADMIN — ENOXAPARIN SODIUM 30 MG: 100 INJECTION SUBCUTANEOUS at 21:14

## 2024-12-11 RX ADMIN — POTASSIUM CHLORIDE 40 MEQ: 1500 TABLET, EXTENDED RELEASE ORAL at 13:55

## 2024-12-11 RX ADMIN — Medication 1 TABLET: at 21:11

## 2024-12-11 RX ADMIN — ALBUMIN (HUMAN) 25 G: 0.25 INJECTION, SOLUTION INTRAVENOUS at 17:35

## 2024-12-11 RX ADMIN — Medication 1 TABLET: at 08:35

## 2024-12-11 RX ADMIN — LORAZEPAM 1 MG: 0.5 TABLET ORAL at 00:45

## 2024-12-11 RX ADMIN — ONDANSETRON 4 MG: 2 INJECTION INTRAMUSCULAR; INTRAVENOUS at 14:22

## 2024-12-11 RX ADMIN — ASPIRIN 81 MG: 81 TABLET, CHEWABLE ORAL at 08:35

## 2024-12-11 RX ADMIN — DIVALPROEX SODIUM 125 MG: 125 CAPSULE ORAL at 21:11

## 2024-12-11 RX ADMIN — Medication: at 21:10

## 2024-12-11 ASSESSMENT — PAIN SCALES - GENERAL: PAINLEVEL_OUTOF10: 0

## 2024-12-11 NOTE — PROGRESS NOTES
Bedside shift change report given to CARMITA Henry (oncoming nurse) by CARMITA Castillo (offgoing nurse). Report included the following information Nurse Handoff Report, Index, ED SBAR, Adult Overview, Intake/Output, and MAR.

## 2024-12-11 NOTE — PROGRESS NOTES
Laying 104/50 HR 46.  Sitting 91/60 HR 59.  Standing 90/59 HR 73 . No c/o dizziness with position changes.

## 2024-12-11 NOTE — CONSULTS
[facility] EP/ ARRHYTHMIA/ CARDIOLOGY CONSULT      EP Cardiology consult requested by Danielle Olivares MD for aortic stenosis  PCP:  No primary care provider on file.    Primary cardiologist: [facility]     Electrophysiology Service  12/11/2024     Assessment:   Aortic stenosis  Low gradient even with dobutamine stress per [facility] note  Saw Dr. Cherry Sky at [facility] in 2023 who thought he will not benefit from a TAVR  Echo here shows normal EF with mild diastolic dysfunction and moderate AS with gradient of 22 mmHg  Orthostatic hypotension   Likely exacerbated by his aortic stenosis  Seems to be responding to po midodrine  CAD  Chronically dissected RCA per [facility] note  Bradycardia  Sinus lizz when laying down but HR picks up when up  History of CVA  COLLIN   CKD  HTN  HLD   DM2  Dementia    Plan:   No need for any interventions right now  Continue po midodrine  Follow up with cardiology at [facility]  I will give him a dose of IV albumin.       []    High complexity decision making was performed  []    Patient is at high-risk of decompensation with multiple organ involvement       HPI:  Josue Cruz is a 79 y.o. male with history dementia, hypertension, hyperlipidemia, diabetes mellitus, COLLIN, prior CVA, stage 3 CKD, severe COPD (non-smoking related), cervical spine disease s/p laminectomy, restrictive oropharyngeal dysphagia, nonobstructive coronary disease, and low-gradient aortic stenosis. Patient has a history of aortic stenosis which is followed by the [facility] valve clinic. His aortic gradient was low and not a TAVR candidate. He has been having a lot of cough with chest pain. He tells me that his chest pain is worse during the winter when it is cold. Here his BNP was elevated to 1000. Troponin have been negative. Has a known chronically dissected LCX. On ASA currently. BP has been soft during this admission with orthostatic hypotension.   No Known Allergies        ROS:       10 point review of systems completed by myself

## 2024-12-11 NOTE — CARE COORDINATION
Received call from [facility] with [facility]; Shivani reported that pt is at [facility] for long term care with [facility] as [facility]er source.  faxed clinicals to Shivani [phone] as requested; will update Shivani regularly per request. Referral opened to [facility] in Epic/ CCLink for clinical updates to facility.    Aminata Sparks, [facility]  Care Management  [phone]

## 2024-12-11 NOTE — PROGRESS NOTES
Hospitalist Progress Note    NAME:   Josue Cruz   : 1945   MRN: [id number]     Date/Time: 2024 9:32 AM  Patient PCP: No primary care provider on file.    Estimated discharge date:   Barriers: Hemodynamic stability, echocardiogram, cardiology consult, potassium replacement      Assessment / Plan:  Hypotension  Hx of severe aortic stenosis with systolic HF (per reports EF 45-50%)   - Unclear etiology, possibly hypovolemia secondary to home diuretic use   - Patient with hx of dementia, unable to verify history or medications  - S/p IVF in the ED, also received dose of midodrine   - [facility] did note hx of severe [facility] stenosis per note in    - Repeat orthostatics in AM   - Add scheduled midodrine if having persistent hypotension   - TTE ordered  : Blood pressure of 90/59, heart rate ranging between 46-73.  Echocardiogram was done and report pending.  Will consult cardiology for further input.    Hypokalemia  : Potassium of 2.8 and will give 80 KCl orally and 20 KCl IV.  Magnesium is optimal at 2.0.     Hx of delirium  Dementia  - Noted history of dementia, also noted during prior hospitalizations - admission [facility] 2023 c/b hospital delirium  - Outside med list showing zyprexa, atarax, depakote - will continue for now and place pharmacy consult to confirm     CAD  -Continue ASA     Asthma  - Continue advair       Medical Decision Making:   I personally reviewed labs: CBC, BMP, magnesium  I personally reviewed imaging: Chest x-ray, CT abdomen pelvis  I personally reviewed EKG:  Toxic drug monitoring: H&H while patient is on Lovenox  Discussed case with: Patient, RN        Code Status: Full code  DVT Prophylaxis: Lovenox  GI Prophylaxis:    Subjective:     Chief Complaint / Reason for Physician Visit  \" Follow-up for hypertension, severe aortic stenosis, CHF with EF of 50%, dementia, history of delirium, CAD, asthma.\".  Discussed with RN events overnight.       Objective:     VITALS:  MD

## 2024-12-12 LAB
ALBUMIN SERPL-MCNC: 2.6 G/DL (ref 3.5–5)
ALBUMIN/GLOB SERPL: 0.8 (ref 1.1–2.2)
ALP SERPL-CCNC: 89 U/L (ref 45–117)
ALT SERPL-CCNC: 25 U/L (ref 12–78)
ANION GAP SERPL CALC-SCNC: 4 MMOL/L (ref 2–12)
AST SERPL-CCNC: 19 U/L (ref 15–37)
BASOPHILS # BLD: 0.1 K/UL (ref 0–0.1)
BASOPHILS NFR BLD: 1 % (ref 0–1)
BILIRUB SERPL-MCNC: 0.4 MG/DL (ref 0.2–1)
BUN SERPL-MCNC: 14 MG/DL (ref 6–20)
BUN/CREAT SERPL: 21 (ref 12–20)
CALCIUM SERPL-MCNC: 8.7 MG/DL (ref 8.5–10.1)
CHLORIDE SERPL-SCNC: 111 MMOL/L (ref 97–108)
CO2 SERPL-SCNC: 28 MMOL/L (ref 21–32)
CREAT SERPL-MCNC: 0.66 MG/DL (ref 0.7–1.3)
DIFFERENTIAL METHOD BLD: ABNORMAL
EOSINOPHIL # BLD: 0.5 K/UL (ref 0–0.4)
EOSINOPHIL NFR BLD: 9 % (ref 0–7)
ERYTHROCYTE [DISTWIDTH] IN BLOOD BY AUTOMATED COUNT: 15.4 % (ref 11.5–14.5)
GLOBULIN SER CALC-MCNC: 3.3 G/DL (ref 2–4)
GLUCOSE SERPL-MCNC: 78 MG/DL (ref 65–100)
HCT VFR BLD AUTO: 33.8 % (ref 36.6–50.3)
HGB BLD-MCNC: 10.4 G/DL (ref 12.1–17)
IMM GRANULOCYTES # BLD AUTO: 0 K/UL (ref 0–0.04)
IMM GRANULOCYTES NFR BLD AUTO: 1 % (ref 0–0.5)
LYMPHOCYTES # BLD: 1.3 K/UL (ref 0.8–3.5)
LYMPHOCYTES NFR BLD: 23 % (ref 12–49)
MAGNESIUM SERPL-MCNC: 2.2 MG/DL (ref 1.6–2.4)
MCH RBC QN AUTO: 29.1 PG (ref 26–34)
MCHC RBC AUTO-ENTMCNC: 30.8 G/DL (ref 30–36.5)
MCV RBC AUTO: 94.7 FL (ref 80–99)
MONOCYTES # BLD: 0.6 K/UL (ref 0–1)
MONOCYTES NFR BLD: 10 % (ref 5–13)
NEUTS SEG # BLD: 3.2 K/UL (ref 1.8–8)
NEUTS SEG NFR BLD: 56 % (ref 32–75)
NRBC # BLD: 0 K/UL (ref 0–0.01)
NRBC BLD-RTO: 0 PER 100 WBC
PHOSPHATE SERPL-MCNC: 2.8 MG/DL (ref 2.6–4.7)
PLATELET # BLD AUTO: 277 K/UL (ref 150–400)
PMV BLD AUTO: 10.4 FL (ref 8.9–12.9)
POTASSIUM SERPL-SCNC: 4.1 MMOL/L (ref 3.5–5.1)
PROT SERPL-MCNC: 5.9 G/DL (ref 6.4–8.2)
RBC # BLD AUTO: 3.57 M/UL (ref 4.1–5.7)
SODIUM SERPL-SCNC: 143 MMOL/L (ref 136–145)
WBC # BLD AUTO: 5.6 K/UL (ref 4.1–11.1)

## 2024-12-12 PROCEDURE — 6370000000 HC RX 637 (ALT 250 FOR IP): Performed by: STUDENT IN AN ORGANIZED HEALTH CARE EDUCATION/TRAINING PROGRAM

## 2024-12-12 PROCEDURE — 97162 PT EVAL MOD COMPLEX 30 MIN: CPT

## 2024-12-12 PROCEDURE — 2580000003 HC RX 258: Performed by: STUDENT IN AN ORGANIZED HEALTH CARE EDUCATION/TRAINING PROGRAM

## 2024-12-12 PROCEDURE — 97116 GAIT TRAINING THERAPY: CPT

## 2024-12-12 PROCEDURE — 80053 COMPREHEN METABOLIC PANEL: CPT

## 2024-12-12 PROCEDURE — 36415 COLL VENOUS BLD VENIPUNCTURE: CPT

## 2024-12-12 PROCEDURE — 1100000000 HC RM PRIVATE

## 2024-12-12 PROCEDURE — 85025 COMPLETE CBC W/AUTO DIFF WBC: CPT

## 2024-12-12 PROCEDURE — 84100 ASSAY OF PHOSPHORUS: CPT

## 2024-12-12 PROCEDURE — 6360000002 HC RX W HCPCS: Performed by: STUDENT IN AN ORGANIZED HEALTH CARE EDUCATION/TRAINING PROGRAM

## 2024-12-12 PROCEDURE — 83735 ASSAY OF MAGNESIUM: CPT

## 2024-12-12 RX ADMIN — SERTRALINE HYDROCHLORIDE 50 MG: 50 TABLET ORAL at 08:58

## 2024-12-12 RX ADMIN — SODIUM CHLORIDE, PRESERVATIVE FREE 5 ML: 5 INJECTION INTRAVENOUS at 10:26

## 2024-12-12 RX ADMIN — SODIUM CHLORIDE, PRESERVATIVE FREE 10 ML: 5 INJECTION INTRAVENOUS at 22:07

## 2024-12-12 RX ADMIN — ENOXAPARIN SODIUM 30 MG: 100 INJECTION SUBCUTANEOUS at 22:07

## 2024-12-12 RX ADMIN — DIVALPROEX SODIUM 125 MG: 125 CAPSULE ORAL at 22:06

## 2024-12-12 RX ADMIN — Medication: at 10:26

## 2024-12-12 RX ADMIN — PANTOPRAZOLE SODIUM 40 MG: 40 TABLET, DELAYED RELEASE ORAL at 07:00

## 2024-12-12 RX ADMIN — DIVALPROEX SODIUM 125 MG: 125 CAPSULE ORAL at 10:24

## 2024-12-12 RX ADMIN — DONEPEZIL HYDROCHLORIDE 10 MG: 5 TABLET, FILM COATED ORAL at 08:58

## 2024-12-12 RX ADMIN — MIDODRINE HYDROCHLORIDE 5 MG: 5 TABLET ORAL at 17:45

## 2024-12-12 RX ADMIN — MIDODRINE HYDROCHLORIDE 5 MG: 5 TABLET ORAL at 08:57

## 2024-12-12 RX ADMIN — Medication: at 22:18

## 2024-12-12 RX ADMIN — THERA TABS 1 TABLET: TAB at 08:58

## 2024-12-12 RX ADMIN — Medication 1 TABLET: at 10:24

## 2024-12-12 RX ADMIN — ASPIRIN 81 MG: 81 TABLET, CHEWABLE ORAL at 08:57

## 2024-12-12 RX ADMIN — MIDODRINE HYDROCHLORIDE 5 MG: 5 TABLET ORAL at 12:17

## 2024-12-12 RX ADMIN — OXYCODONE HYDROCHLORIDE AND ACETAMINOPHEN 500 MG: 500 TABLET ORAL at 08:58

## 2024-12-12 RX ADMIN — OLANZAPINE 2.5 MG: 5 TABLET, FILM COATED ORAL at 22:06

## 2024-12-12 RX ADMIN — RISPERIDONE 0.25 MG: 0.25 TABLET, FILM COATED ORAL at 08:58

## 2024-12-12 RX ADMIN — ENOXAPARIN SODIUM 30 MG: 100 INJECTION SUBCUTANEOUS at 08:58

## 2024-12-12 RX ADMIN — Medication 1 TABLET: at 22:06

## 2024-12-12 ASSESSMENT — PAIN SCALES - GENERAL
PAINLEVEL_OUTOF10: 0
PAINLEVEL_OUTOF10: 0

## 2024-12-12 NOTE — PROGRESS NOTES
Hospitalist Progress Note    NAME:   Josue Cruz   : 1945   MRN: [id number]     Date/Time: 2024 8:35 AM  Patient PCP: No primary care provider on file.    Estimated discharge date:   Barriers: Hemodynamic stability, cardiology clearance, likely return to [facility] and long-term care      Assessment / Plan:  Orthostatic hypotension  Hx of severe aortic stenosis with systolic HF (per reports EF 45-50%)   - Unclear etiology, possibly hypovolemia secondary to home diuretic use   - Patient with hx of dementia, unable to verify history or medications  - S/p IVF in the ED, also received dose of midodrine   - [facility] note hx of severe [facility] stenosis per note in    - Repeat orthostatics in AM   - Add scheduled midodrine if having persistent hypotension   - TTE ordered  : Blood pressure of 90/59, heart rate ranging between 46-73.  Echocardiogram was done and report pending.  Will consult cardiology for further input.  : Echocardiogram from  shows EF of 60%, grade 1 diastolic dysfunction, mild AR, moderate AS. Appreciate input from cardiology, patient got 1 dose of IV albumin, plan is to continue oral midodrine.    Hypokalemia  : Potassium of 2.8 and will give 80 KCl orally and 20 KCl IV.  Magnesium is optimal at 2.0.  : Potassium is up at 4.1.  Magnesium is 2.2.    Left rib/costal pain  GERD  Patient reports that he had fracture of his left rib when he was in the [employer].  He has intermittent pain on his left rib which is reproducible.  Also reported GERD.  Will give him Maalox.     Hx of delirium  Dementia  - Noted history of dementia, also noted during prior hospitalizations - admission [facility] c/b hospital delirium  - Outside med list showing zyprexa, atarax, depakote - will continue for now and place pharmacy consult to confirm     CAD  -Continue ASA  : as per cardiology, patient has chronically dissected RCA per [facility] note.     Asthma  - Continue advair

## 2024-12-12 NOTE — PROGRESS NOTES
PHYSICAL THERAPY EVALUATION/DISCHARGE    Patient: Josue Cruz (79 y.o. male)  Date: 12/12/2024  Primary Diagnosis: Abdominal pain, epigastric [R10.13]  Dehydration [E86.0]  Hypotension [I95.9]  Chest pain, unspecified type [R07.9]       Precautions:                        ASSESSMENT AND RECOMMENDATIONS:  Based on the objective data below, the patient presents at [facility] level of assist. Unsure of his prior level of function as patient is A.Ox 1 to name only and unable to speak with staff at [facility]. BP dropped from sit to stand, however patient asymptomatic, and recovered in sitting. Patient is LTC resident in dementia unit at [facility]. Recommend he return there. At end of session patient was left sitting up in chair with chair alarm activated, call bell within reach, no complaints.    /77 supine  /64 sit  BP 84/50 stand  /55 sit in chair with feet elevated    Functional Outcome Measure:  The patient scored 20/24 on the [facility] outcome measure which is indicative of patient can return to his LTC residence.          Further skilled acute physical therapy is not indicated at this time.       PLAN :  Recommendation for discharge: (in order for the patient to meet his/her long term goals):   No skilled physical therapy    Other factors to consider for discharge: no additional factors    IF patient discharges home will need the following DME: none       SUBJECTIVE:   Patient stated “If you get me a newspaper I can take care of it myself.”    OBJECTIVE DATA SUMMARY:   No past medical history on file.No past surgical history on file.    Home Situation and Prior Level of Function: Patient is LT resident of [facility], dementia unit. Poor historian. SARAHOx 1 to name only.   Social/Functional History  Lives With: Other (comment) ([facility] dementia unit at [facility])  Type of Home:  ([facility] dementia unit at [facility])  Home Layout: One  Short Form (6-Clicks) Version 2  How much HELP from another person do you currently need... (If the patient hasn't done an activity recently, how much help from another person do you think they would need if they tried?) Total A Lot A Little None   1.  Turning from your back to your side while in a flat bed without using bedrails? []  1 []  2 []  3  [x]  4   2.  Moving from lying on your back to sitting on the side of a flat bed without using bedrails? []  1 []  2 []  3  [x]  4   3.  Moving to and from a bed to a chair (including a wheelchair)? []  1 []  2 [x]  3  []  4   4. Standing up from a chair using your arms (e.g. wheelchair or bedside chair)? []  1 []  2 [x]  3  []  4   5.  Walking in hospital room? []  1 []  2 [x]  3  []  4   6.  Climbing 3-5 steps with a railing? []  1 []  2 [x]  3  []  4     Raw Score: 20/24                            Cutoff score <=171,2,3 had higher odds of discharging home with home health or need of SNF/IPR.    1. Malini Durán, Marily Story, Alexander Harry, Radha Hogan, Teto Davey, Ming Durán.  Validity of the AM-PAC “6-Clicks” Inpatient Daily Activity and Basic Mobility Short Forms. Physical Therapy Mar 2014, 94 [phone]) 379-391; DOI: 10.2522/ptj.68863697  2. Rico PARK, Danielle RAI, Marian RAI, Kel RAI. Association of AM-PAC \"6-Clicks\" Basic Mobility and Daily Activity Scores With Discharge Destination. Phys Ther. 2021 Apr 4;101(4):mplz762. doi: 10.1093/ptj/ktip418. PMID: 76653505.  3. John RAI, Alexia AGUIRRE, Merry S, Magnolia K, Jesus S. Activity Measure for Post-Acute Care \"6-Clicks\" Basic Mobility Scores Predict Discharge Destination After Acute Care Hospitalization in Select Patient Groups: A Retrospective, Observational Study. Arch Rehabil Res Clin Transl. 2022 Jul 16;4(3):096910. doi: 10.1016/j.arrct.2022.457458. PMID: 09176561; PMCID: [id number].  4. Leeanna ROGER, Ruth S, Polly W, Olga P. AM-PAC Short Forms Manual 4.0. Revised 2/2020.

## 2024-12-12 NOTE — PROGRESS NOTES
End of Shift Note    Bedside shift change report given to RN (oncoming nurse) by Viki Rhodes LPN (offgoing nurse).  Report included the following information SBAR, Kardex, MAR, Recent Results, and Cardiac Rhythm NSR-SB, 1degree AVB, PVC    Shift worked:  7p-7a     Shift summary and any significant changes:     Hypotensive. Systolic ranging 102-105s  Q2hr and foam applied to sacrum  No complaints of pain.   Uneventful night  Tele-SB overnights       Concerns for physician to address:       Zone phone for oncoming shift:          Activity:     Number times ambulated in hallways past shift: 0  Number of times OOB to chair past shift: 0    Cardiac:   Cardiac Monitoring: Yes      Cardiac Rhythm: Sinus lizz, Sinus rhythm, Multiform PVCs-sinus lizz, 1st degree AVB, PVC    Access:  Current line(s): PIV     Genitourinary:   Urinary Status: Voiding, External catheter    Respiratory:   O2 Device: None (Room air)  Chronic home O2 use?: NO  Incentive spirometer at bedside: N/A    GI:  Last BM (including prior to admit): 12/10/24  Current diet:  ADULT DIET; Regular  Passing flatus: NO    Pain Management:   Patient states pain is manageable on current regimen: N/A    Skin:  Erick Scale Score: 17  Interventions: Wound Offloading (Prevention Methods): Bed, pressure reduction mattress, Pillows, Repositioning    Patient Safety:  Fall Risk:    Fall Risk Interventions  Toilet Every 2 Hours-In Advance of Need: Yes  Hourly Visual Checks: In bed, Quiet, Eyes closed  Fall Visual Posted: Armband, Socks, Fall sign posted  Room Door Open: Yes  Alarm On: Bed  Patient Moved Closer to Nursing Station: No    Active Consults:   IP CONSULT TO PHARMACY  IP CONSULT TO CARDIOLOGY    Length of Stay:  Expected LOS: 4  Actual LOS: 2    Viki Rhodes LPN

## 2024-12-13 VITALS
WEIGHT: 225 LBS | TEMPERATURE: 98.1 F | HEART RATE: 61 BPM | DIASTOLIC BLOOD PRESSURE: 73 MMHG | SYSTOLIC BLOOD PRESSURE: 108 MMHG | RESPIRATION RATE: 18 BRPM | BODY MASS INDEX: 29.82 KG/M2 | OXYGEN SATURATION: 100 % | HEIGHT: 73 IN

## 2024-12-13 PROCEDURE — 6370000000 HC RX 637 (ALT 250 FOR IP): Performed by: STUDENT IN AN ORGANIZED HEALTH CARE EDUCATION/TRAINING PROGRAM

## 2024-12-13 PROCEDURE — 97166 OT EVAL MOD COMPLEX 45 MIN: CPT | Performed by: OCCUPATIONAL THERAPIST

## 2024-12-13 PROCEDURE — 6360000002 HC RX W HCPCS: Performed by: STUDENT IN AN ORGANIZED HEALTH CARE EDUCATION/TRAINING PROGRAM

## 2024-12-13 PROCEDURE — 2580000003 HC RX 258: Performed by: STUDENT IN AN ORGANIZED HEALTH CARE EDUCATION/TRAINING PROGRAM

## 2024-12-13 PROCEDURE — 97535 SELF CARE MNGMENT TRAINING: CPT | Performed by: OCCUPATIONAL THERAPIST

## 2024-12-13 RX ORDER — MIDODRINE HYDROCHLORIDE 5 MG/1
5 TABLET ORAL
Qty: 90 TABLET | Refills: 0 | Status: SHIPPED | OUTPATIENT
Start: 2024-12-13

## 2024-12-13 RX ADMIN — ENOXAPARIN SODIUM 30 MG: 100 INJECTION SUBCUTANEOUS at 08:12

## 2024-12-13 RX ADMIN — Medication 1 TABLET: at 08:12

## 2024-12-13 RX ADMIN — MIDODRINE HYDROCHLORIDE 5 MG: 5 TABLET ORAL at 08:11

## 2024-12-13 RX ADMIN — THERA TABS 1 TABLET: TAB at 08:12

## 2024-12-13 RX ADMIN — RISPERIDONE 0.25 MG: 0.25 TABLET, FILM COATED ORAL at 08:11

## 2024-12-13 RX ADMIN — Medication: at 08:19

## 2024-12-13 RX ADMIN — SODIUM CHLORIDE, PRESERVATIVE FREE 10 ML: 5 INJECTION INTRAVENOUS at 08:15

## 2024-12-13 RX ADMIN — OXYCODONE HYDROCHLORIDE AND ACETAMINOPHEN 500 MG: 500 TABLET ORAL at 08:12

## 2024-12-13 RX ADMIN — MIDODRINE HYDROCHLORIDE 5 MG: 5 TABLET ORAL at 12:05

## 2024-12-13 RX ADMIN — ASPIRIN 81 MG: 81 TABLET, CHEWABLE ORAL at 08:12

## 2024-12-13 RX ADMIN — DONEPEZIL HYDROCHLORIDE 10 MG: 5 TABLET, FILM COATED ORAL at 08:12

## 2024-12-13 RX ADMIN — SERTRALINE HYDROCHLORIDE 50 MG: 50 TABLET ORAL at 08:12

## 2024-12-13 RX ADMIN — DIVALPROEX SODIUM 125 MG: 125 CAPSULE ORAL at 08:12

## 2024-12-13 RX ADMIN — PANTOPRAZOLE SODIUM 40 MG: 40 TABLET, DELAYED RELEASE ORAL at 06:16

## 2024-12-13 ASSESSMENT — PAIN SCALES - GENERAL: PAINLEVEL_OUTOF10: 0

## 2024-12-13 NOTE — PLAN OF CARE
Problem: Skin/Tissue Integrity  Goal: Absence of new skin breakdown  Description: 1.  Monitor for areas of redness and/or skin breakdown  2.  Assess vascular access sites hourly  3.  Every 4-6 hours minimum:  Change oxygen saturation probe site  4.  Every 4-6 hours:  If on nasal continuous positive airway pressure, respiratory therapy assess nares and determine need for appliance change or resting period.  12/13/2024 1128 by Bel Verma LPN  Outcome: Progressing  12/13/2024 0057 by Mariangel Humphries, RN  Outcome: Progressing     Problem: Safety - Adult  Goal: Free from fall injury  12/13/2024 1128 by Bel Verma LPN  Outcome: Progressing  Flowsheets (Taken 12/13/2024 0732)  Free From Fall Injury: Instruct family/caregiver on patient safety  12/13/2024 0057 by Mariangel Humphries, RN  Outcome: Progressing     Problem: Respiratory - Adult  Goal: Achieves optimal ventilation and oxygenation  Outcome: Progressing  Flowsheets (Taken 12/13/2024 0732)  Achieves optimal ventilation and oxygenation:   Assess for changes in respiratory status   Assess for changes in mentation and behavior   Position to facilitate oxygenation and minimize respiratory effort   Oxygen supplementation based on oxygen saturation or arterial blood gases   Encourage broncho-pulmonary hygiene including cough, deep breathe, incentive spirometry   Assess the need for suctioning and aspirate as needed   Assess and instruct to report shortness of breath or any respiratory difficulty   Respiratory therapy support as indicated     Problem: Pain  Goal: Verbalizes/displays adequate comfort level or baseline comfort level  Outcome: Progressing     Problem: Confusion  Goal: Confusion, delirium, dementia, or psychosis is improved or at baseline  Description: INTERVENTIONS:  1. Assess for possible contributors to thought disturbance, including medications, impaired vision or hearing, underlying metabolic abnormalities, dehydration, psychiatric diagnoses,

## 2024-12-13 NOTE — PROGRESS NOTES
OCCUPATIONAL THERAPY EVALUATION/DISCHARGE  Patient: Josue Cruz (79 y.o. male)  Date: 12/13/2024  Primary Diagnosis: Abdominal pain, epigastric [R10.13]  Dehydration [E86.0]  Hypotension [I95.9]  Chest pain, unspecified type [R07.9]         Precautions:                fall    ASSESSMENT :  Based on the objective data below, the patient presents likely close to his baseline level.  He is unable to report his PLOF and is oriented to self only.  Per medical record, pt lives in dementia unit of LTC facility.  He was calm, cooperative, and pleasant during tx session.  He needed up to min A for adls this session and CGA for standing and taking steps.  No acute OT needs at this time.      Functional Outcome Measure:  The patient scored 45/100 on the Barthel Index outcome measure which is indicative of partially dependent in adls.      Further skilled acute occupational therapy is not indicated at this time.     PLAN :  Recommend with staff: encourage up to bathroom and frequent position changes.      Recommendation for discharge: (in order for the patient to meet his/her long term goals):   No skilled occupational therapy--return to dementia unit at LT facility    Other factors to consider for discharge: poor safety awareness, impaired cognition, high risk for falls, and not safe to be alone    IF patient discharges home will need the following DME:  likely none     SUBJECTIVE:   Patient stated, “I'm a little off balance.”    OBJECTIVE DATA SUMMARY:   No past medical history on file.No past surgical history on file.    Prior Level of Function/Environment/Context: Pt unable to provide PLOF.  Has assistance from staff in dementia unit of LTC facility.  Pt stated that he has not received therapy in the past  ,  ,  ,  ,  ,  ,  ,  ,  ,  ,       Expanded or extensive additional review of patient history:   Social/Functional History  Lives With: Other (comment) (long term care dementia unit)  Type of Home: Facility  Home  Layout: One level  Home Access: Level entry  Has the patient had two or more falls in the past year or any fall with injury in the past year?:  (pt is not a reliable --unable to provide PLOF due to dementia)    Hand Dominance: right     EXAMINATION OF PERFORMANCE DEFICITS:    Cognitive/Behavioral Status:    Orientation  Overall Orientation Status: Impaired  Orientation Level: Oriented to person  Cognition  Arousal/Alertness: Appears intact  Following Commands: Appears intact;Follows one step commands consistently  Attention Span: Attends with cues to redirect  Memory: Decreased recall of recent events;Decreased short term memory (ableto immed. recall 3/3 nouns, but 0/3 recalled post 5 min.)  Safety Judgement: Decreased awareness of need for safety;Decreased awareness of need for assistance  Problem Solving: Assistance required to identify errors made;Assistance required to generate solutions  Insights: Decreased awareness of deficits  Initiation: Requires cues for some  Sequencing: Requires cues for some  Cognition Comment: lives in LTC dementia unit    Skin: appears intact, feet very dry    Edema: none observed    Hearing:    Appears functional    Vision/Perceptual:    Vision - Basic Assessment  Patient Visual Report: No visual complaint reported.             Range of Motion:   AROM: Generally decreased, functional  PROM: Generally decreased, functional      Strength:  Strength: Generally decreased, functional      Coordination:  Coordination: Generally decreased, functional            Tone & Sensation:   Tone: Normal  Sensation: Intact      Functional Mobility and Transfers for ADLs:  Bed Mobility:     Bed Mobility Training  Bed Mobility Training: Yes  Supine to Sit: Supervision  Sit to Supine: Supervision  Scooting: Stand-by assistance    Transfers:     Transfer Training  Sit to Stand: Contact-guard assistance  Stand to Sit: Contact-guard assistance                                   Balance:

## 2024-12-13 NOTE — PROGRESS NOTES
DISCHARGE NOTE FROM ORTHO NURSE    Patient determined to be stable for discharge by attending provider. I have reviewed the discharge instructions with [facility] . They verbalized understanding and all questions were answered to their satisfaction. No complaints or further questions were expressed.      Medications sent to pharmacy. Appropriate educational materials and medication side effect teaching were provided.      PIV were removed prior to discharge.     Patient did not discharge with any line, saldivar, or drain.    Personal items and valuables accounted for at discharge by patient and/or family: Yes    Post-op patient: No    Bel Verma LPN

## 2024-12-13 NOTE — CARE COORDINATION
[facility], room [address] on [address]  Call report to [phone]  [facility] 1630    Transition of Care Plan:    RUR: 13% low risk  Prior Level of Functioning: care provided by memory care unit LTC staff at [facility]  Disposition: [facility] memory care unit for LTC  BRIANA: 12/13/24  If SNF or IPR: Date FOC offered: n/a, LTC resident  Date FOC received:   Accepting facility: [facility]  Date authorization started with reference number:   Date authorization received and expires:   Follow up appointments: defer to facility  DME needed: defer to facility  Transportation at discharge: 1630 [facility]  IM/IMM Medicare/ letter given: 12/10/24  Is patient a  and connected with [facility]? yes   If yes, was  transfer form completed and VA notified? VA notified  Caregiver Contact: [facility] staff  Discharge Caregiver contacted prior to discharge? yes  Care Conference needed? no  Barriers to discharge: confirm cardiology clearance    1705 - [facility] has picked pt up. CM called and canceled [facility] through dispatch line.    1551 - CM noted that [facility] scheduled for 1500 showing on tracker as 1740. Spoke with charge nurse and informed CM lead; contacted nursing supervisor, who approved [facility] for a more timely pickup. Scheduled transport through [facility], pickup scheduled for 1630. Care team notified. Discharge summary faxed to Shivani at [facility].    Initial note - Plan is for pt to return to [facility]'s memory care unit to continue LTC. Called Shivani [phone] ext [phone] at [facility], no answer, no voicemail, CM will follow up. Confirmed bed with [facility] staff, information at top of this note. Transportation scheduled for 1500 pickup; care team, [facility] staff notified.    Aminata Sparks, [facility]  Care Management  [phone]

## 2024-12-13 NOTE — PLAN OF CARE
Problem: Skin/Tissue Integrity  Goal: Absence of new skin breakdown  Description: 1.  Monitor for areas of redness and/or skin breakdown  2.  Assess vascular access sites hourly  3.  Every 4-6 hours minimum:  Change oxygen saturation probe site  4.  Every 4-6 hours:  If on nasal continuous positive airway pressure, respiratory therapy assess nares and determine need for appliance change or resting period.  12/13/2024 0057 by Mariangel Humphries, RN  Outcome: Progressing  12/12/2024 1700 by Bel Verma LPN  Outcome: Progressing     Problem: Safety - Adult  Goal: Free from fall injury  12/13/2024 0057 by Mariangel Humphries, RN  Outcome: Progressing  12/12/2024 1700 by Bel Verma LPN  Outcome: Progressing  Flowsheets (Taken 12/12/2024 0830 by Danitza Cortez, RN)  Free From Fall Injury: Instruct family/caregiver on patient safety

## 2024-12-13 NOTE — PLAN OF CARE
Problem: Skin/Tissue Integrity  Goal: Absence of new skin breakdown  Description: 1.  Monitor for areas of redness and/or skin breakdown  2.  Assess vascular access sites hourly  3.  Every 4-6 hours minimum:  Change oxygen saturation probe site  4.  Every 4-6 hours:  If on nasal continuous positive airway pressure, respiratory therapy assess nares and determine need for appliance change or resting period.  12/13/2024 1222 by Bel Verma LPN  Outcome: Completed  12/13/2024 1222 by Bel Verma LPN  Outcome: Progressing  12/13/2024 1128 by Bel Verma LPN  Outcome: Progressing  12/13/2024 0057 by Mariangel Humphries, RN  Outcome: Progressing     Problem: Safety - Adult  Goal: Free from fall injury  12/13/2024 1222 by Bel Verma LPN  Outcome: Completed  12/13/2024 1222 by Bel Verma LPN  Outcome: Progressing  12/13/2024 1128 by Bel Verma LPN  Outcome: Progressing  Flowsheets (Taken 12/13/2024 0732)  Free From Fall Injury: Instruct family/caregiver on patient safety  12/13/2024 0057 by Mariangel Humphries, RN  Outcome: Progressing     Problem: Respiratory - Adult  Goal: Achieves optimal ventilation and oxygenation  12/13/2024 1222 by Bel Verma LPN  Outcome: Completed  12/13/2024 1222 by Bel Verma LPN  Outcome: Progressing  12/13/2024 1128 by Bel Verma LPN  Outcome: Progressing  Flowsheets (Taken 12/13/2024 0732)  Achieves optimal ventilation and oxygenation:   Assess for changes in respiratory status   Assess for changes in mentation and behavior   Position to facilitate oxygenation and minimize respiratory effort   Oxygen supplementation based on oxygen saturation or arterial blood gases   Encourage broncho-pulmonary hygiene including cough, deep breathe, incentive spirometry   Assess the need for suctioning and aspirate as needed   Assess and instruct to report shortness of breath or any respiratory difficulty   Respiratory therapy support as indicated     Problem:

## 2024-12-13 NOTE — DISCHARGE SUMMARY
Discharge Summary    Name: Josue Cruz  [id number]  YOB: 1945 (Age: 79 y.o.)   Date of Admission: 12/9/2024  Date of Discharge: 12/13/2024  Attending Physician: Danielle Olivares MD    Discharge Diagnosis:   Orthostatic hypotension  Hx of severe aortic stenosis with systolic HF (per reports EF 45-50%)   Hypokalemia  Left rib/costal pain  GERD  Hx of delirium  Dementia  CAD  Asthma        Consultations:  IP CONSULT TO PHARMACY  IP CONSULT TO CARDIOLOGY      Brief Admission History/Reason for Admission Per Valdez De La Paz MD: Josue Cruz is a 79 y.o.  male with PMHx significant for  has no past medical history on file. who presents with the above chief complaint.   We were asked to admit for work up and further evaluation.   Patient with history of dementia and unable to provide history.   Per ED, patient presented via EMS from [facility] - reported the memory care unit - sent initial for chest discomfort. On interview, he reports no significant symptoms currently, he is  not sure why he was sent to the ED. He denies any chest pain currently. Denies any SOB, chills, fever. Does report that he has been urinating more frequently than normal however states this is chronic for him. No diarrhea. He denies having any difficulties with blood pressure.         Brief Hospital Course by Main Problems:   Orthostatic hypotension  Hx of severe aortic stenosis with systolic HF (per reports EF 45-50%)   - Unclear etiology, possibly hypovolemia secondary to home diuretic use   - Patient with hx of dementia, unable to verify history or medications  - S/p IVF in the ED, also received dose of midodrine   - [facility] did note hx of severe aortic stenosis per note in 2023   - Repeat orthostatics in AM   - Add scheduled midodrine if having persistent hypotension   - TTE ordered  12/11: Blood pressure of 90/59, heart rate ranging between 46-73.  Echocardiogram was done and report    K 4.1   *   CO2 28   BUN 14   CREATININE 0.66*   GLUCOSE 78   CALCIUM 8.7   PHOS 2.8   MG 2.2     Recent Labs     12/12/24  0524   HGB 10.4*   HCT 33.8*   WBC 5.6          Discharge Medications:     Medication List        START taking these medications      midodrine 5 MG tablet  Commonly known as: PROAMATINE  Take 1 tablet by mouth 3 times daily (with meals)            CONTINUE taking these medications      ascorbic acid 500 MG tablet  Commonly known as: VITAMIN C     aspirin 81 MG chewable tablet     calcium carbonate 500 MG chewable tablet  Commonly known as: TUMS     divalproex 125 MG DR capsule  Commonly known as: DEPAKOTE SPRINKLE     donepezil 10 MG tablet  Commonly known as: ARICEPT     hydrOXYzine HCl 25 MG tablet  Commonly known as: ATARAX     multivitamin Tabs tablet     OLANZapine 5 MG tablet  Commonly known as: ZYPREXA     omeprazole 10 MG delayed release capsule  Commonly known as: PRILOSEC     Oyster Shell Calcium/D 500-5 MG-MCG Tabs     pyrethrins-piperonyl butoxide 0.5 % bottle     risperiDONE 0.5 MG tablet  Commonly known as: RISPERDAL     sertraline 100 MG tablet  Commonly known as: ZOLOFT     zinc gluconate 50 MG tablet            STOP taking these medications      atorvastatin 80 MG tablet  Commonly known as: LIPITOR     budesonide-formoterol 160-4.5 MCG/ACT Aero  Commonly known as: SYMBICORT     ezetimibe 10 MG tablet  Commonly known as: ZETIA     potassium chloride 10 MEQ extended release capsule  Commonly known as: MICRO-K     torsemide 20 MG tablet  Commonly known as: DEMADEX               Where to Get Your Medications        Information about where to get these medications is not yet available    Ask your nurse or doctor about these medications  midodrine 5 MG tablet             DISPOSITION:    Home with Family:       Home with HH/PT/OT/RN:    SNF/LTC: [facility]   RAYNA:    OTHER:            Code status: Full code  Recommended diet: cardiac diet  Recommended

## 2024-12-13 NOTE — WOUND CARE
Wound care nurse consult for left buttock wound.    80 y/o male with dementia admitted for hypotension Patient resides at [facility] at [facility]. Patient for discharge back there today.    No past medical history on file.  No past surgical history on file.   Patient incontinent of urine and stool.. Patient has a blanchable partial thickness wound to left buttock caused by MASD and friction..        Recommend:    Buttocks: daily, cleanse with soap and water, pat dry. Apply zinc oxide cream (orange tube) to open skin and cover with a sacral foam dressing.    COMFORT MONTANEZ RN, CWON

## 2025-01-12 ENCOUNTER — HOSPITAL ENCOUNTER (INPATIENT)
Facility: HOSPITAL | Age: 80
LOS: 4 days | Discharge: NURSING, REHAB OR LONG-TERM CARE | End: 2025-01-16
Attending: EMERGENCY MEDICINE | Admitting: STUDENT IN AN ORGANIZED HEALTH CARE EDUCATION/TRAINING PROGRAM
Payer: OTHER GOVERNMENT

## 2025-01-12 ENCOUNTER — APPOINTMENT (OUTPATIENT)
Facility: HOSPITAL | Age: 80
End: 2025-01-12
Payer: OTHER GOVERNMENT

## 2025-01-12 DIAGNOSIS — I95.89 CHRONIC ASYMPTOMATIC HYPOTENSION: ICD-10-CM

## 2025-01-12 DIAGNOSIS — U07.1 COVID-19: Primary | ICD-10-CM

## 2025-01-12 LAB
ALBUMIN SERPL-MCNC: 2.7 G/DL (ref 3.5–5)
ALBUMIN/GLOB SERPL: 0.8 (ref 1.1–2.2)
ALP SERPL-CCNC: 91 U/L (ref 45–117)
ALT SERPL-CCNC: 17 U/L (ref 12–78)
ANION GAP SERPL CALC-SCNC: 5 MMOL/L (ref 2–12)
AST SERPL-CCNC: 22 U/L (ref 15–37)
BASOPHILS # BLD: 0.02 K/UL (ref 0–0.1)
BASOPHILS NFR BLD: 0.3 % (ref 0–1)
BILIRUB SERPL-MCNC: 0.5 MG/DL (ref 0.2–1)
BUN SERPL-MCNC: 14 MG/DL (ref 6–20)
BUN/CREAT SERPL: 17 (ref 12–20)
CALCIUM SERPL-MCNC: 8.6 MG/DL (ref 8.5–10.1)
CHLORIDE SERPL-SCNC: 107 MMOL/L (ref 97–108)
CO2 SERPL-SCNC: 28 MMOL/L (ref 21–32)
CREAT SERPL-MCNC: 0.84 MG/DL (ref 0.7–1.3)
DIFFERENTIAL METHOD BLD: ABNORMAL
EOSINOPHIL # BLD: 0.07 K/UL (ref 0–0.4)
EOSINOPHIL NFR BLD: 1.1 % (ref 0–7)
ERYTHROCYTE [DISTWIDTH] IN BLOOD BY AUTOMATED COUNT: 16 % (ref 11.5–14.5)
FLUAV RNA SPEC QL NAA+PROBE: NOT DETECTED
FLUBV RNA SPEC QL NAA+PROBE: NOT DETECTED
GLOBULIN SER CALC-MCNC: 3.5 G/DL (ref 2–4)
GLUCOSE BLD STRIP.AUTO-MCNC: 93 MG/DL (ref 65–117)
GLUCOSE SERPL-MCNC: 109 MG/DL (ref 65–100)
HCT VFR BLD AUTO: 35.7 % (ref 36.6–50.3)
HGB BLD-MCNC: 11.1 G/DL (ref 12.1–17)
IMM GRANULOCYTES # BLD AUTO: 0.03 K/UL (ref 0–0.04)
IMM GRANULOCYTES NFR BLD AUTO: 0.5 % (ref 0–0.5)
LACTATE BLD-SCNC: 1.94 MMOL/L (ref 0.4–2)
LYMPHOCYTES # BLD: 0.29 K/UL (ref 0.8–3.5)
LYMPHOCYTES NFR BLD: 4.7 % (ref 12–49)
MCH RBC QN AUTO: 29.8 PG (ref 26–34)
MCHC RBC AUTO-ENTMCNC: 31.1 G/DL (ref 30–36.5)
MCV RBC AUTO: 95.7 FL (ref 80–99)
MONOCYTES # BLD: 0.33 K/UL (ref 0–1)
MONOCYTES NFR BLD: 5.3 % (ref 5–13)
NEUTS SEG # BLD: 5.46 K/UL (ref 1.8–8)
NEUTS SEG NFR BLD: 88.1 % (ref 32–75)
NRBC # BLD: 0 K/UL (ref 0–0.01)
NRBC BLD-RTO: 0 PER 100 WBC
PLATELET # BLD AUTO: 321 K/UL (ref 150–400)
PMV BLD AUTO: 10.5 FL (ref 8.9–12.9)
POTASSIUM SERPL-SCNC: 3.9 MMOL/L (ref 3.5–5.1)
PROCALCITONIN SERPL-MCNC: <0.05 NG/ML
PROT SERPL-MCNC: 6.2 G/DL (ref 6.4–8.2)
RBC # BLD AUTO: 3.73 M/UL (ref 4.1–5.7)
RBC MORPH BLD: ABNORMAL
SARS-COV-2 RNA RESP QL NAA+PROBE: DETECTED
SERVICE CMNT-IMP: NORMAL
SODIUM SERPL-SCNC: 140 MMOL/L (ref 136–145)
SOURCE: ABNORMAL
TROPONIN I SERPL HS-MCNC: 51 NG/L (ref 0–76)
WBC # BLD AUTO: 6.2 K/UL (ref 4.1–11.1)

## 2025-01-12 PROCEDURE — 93005 ELECTROCARDIOGRAM TRACING: CPT | Performed by: EMERGENCY MEDICINE

## 2025-01-12 PROCEDURE — 6360000004 HC RX CONTRAST MEDICATION: Performed by: EMERGENCY MEDICINE

## 2025-01-12 PROCEDURE — 83605 ASSAY OF LACTIC ACID: CPT

## 2025-01-12 PROCEDURE — 84484 ASSAY OF TROPONIN QUANT: CPT

## 2025-01-12 PROCEDURE — 87040 BLOOD CULTURE FOR BACTERIA: CPT

## 2025-01-12 PROCEDURE — 36415 COLL VENOUS BLD VENIPUNCTURE: CPT

## 2025-01-12 PROCEDURE — 2580000003 HC RX 258: Performed by: STUDENT IN AN ORGANIZED HEALTH CARE EDUCATION/TRAINING PROGRAM

## 2025-01-12 PROCEDURE — 85025 COMPLETE CBC W/AUTO DIFF WBC: CPT

## 2025-01-12 PROCEDURE — 71260 CT THORAX DX C+: CPT

## 2025-01-12 PROCEDURE — 6370000000 HC RX 637 (ALT 250 FOR IP): Performed by: STUDENT IN AN ORGANIZED HEALTH CARE EDUCATION/TRAINING PROGRAM

## 2025-01-12 PROCEDURE — 82962 GLUCOSE BLOOD TEST: CPT

## 2025-01-12 PROCEDURE — 84145 PROCALCITONIN (PCT): CPT

## 2025-01-12 PROCEDURE — 6370000000 HC RX 637 (ALT 250 FOR IP): Performed by: EMERGENCY MEDICINE

## 2025-01-12 PROCEDURE — 87636 SARSCOV2 & INF A&B AMP PRB: CPT

## 2025-01-12 PROCEDURE — 1100000000 HC RM PRIVATE

## 2025-01-12 PROCEDURE — 99285 EMERGENCY DEPT VISIT HI MDM: CPT

## 2025-01-12 PROCEDURE — 81001 URINALYSIS AUTO W/SCOPE: CPT

## 2025-01-12 PROCEDURE — 80053 COMPREHEN METABOLIC PANEL: CPT

## 2025-01-12 PROCEDURE — 96360 HYDRATION IV INFUSION INIT: CPT

## 2025-01-12 RX ORDER — DIVALPROEX SODIUM 125 MG/1
125 CAPSULE, COATED PELLETS ORAL 2 TIMES DAILY
Status: DISCONTINUED | OUTPATIENT
Start: 2025-01-12 | End: 2025-01-16 | Stop reason: HOSPADM

## 2025-01-12 RX ORDER — OLANZAPINE 2.5 MG/1
2.5 TABLET, FILM COATED ORAL NIGHTLY
Status: DISCONTINUED | OUTPATIENT
Start: 2025-01-12 | End: 2025-01-12

## 2025-01-12 RX ORDER — OLANZAPINE 5 MG/1
2.5 TABLET ORAL NIGHTLY
Status: DISCONTINUED | OUTPATIENT
Start: 2025-01-13 | End: 2025-01-15

## 2025-01-12 RX ORDER — MIDODRINE HYDROCHLORIDE 5 MG/1
5 TABLET ORAL ONCE
Status: COMPLETED | OUTPATIENT
Start: 2025-01-12 | End: 2025-01-12

## 2025-01-12 RX ORDER — IOPAMIDOL 755 MG/ML
100 INJECTION, SOLUTION INTRAVASCULAR
Status: COMPLETED | OUTPATIENT
Start: 2025-01-12 | End: 2025-01-12

## 2025-01-12 RX ORDER — MIDODRINE HYDROCHLORIDE 5 MG/1
10 TABLET ORAL
Status: DISCONTINUED | OUTPATIENT
Start: 2025-01-12 | End: 2025-01-16 | Stop reason: HOSPADM

## 2025-01-12 RX ORDER — RISPERIDONE 0.25 MG/1
0.25 TABLET ORAL DAILY
Status: DISCONTINUED | OUTPATIENT
Start: 2025-01-13 | End: 2025-01-15

## 2025-01-12 RX ORDER — ACETAMINOPHEN 500 MG
1000 TABLET ORAL
Status: COMPLETED | OUTPATIENT
Start: 2025-01-12 | End: 2025-01-12

## 2025-01-12 RX ORDER — SODIUM CHLORIDE 9 MG/ML
INJECTION, SOLUTION INTRAVENOUS PRN
Status: DISCONTINUED | OUTPATIENT
Start: 2025-01-12 | End: 2025-01-16 | Stop reason: HOSPADM

## 2025-01-12 RX ORDER — ONDANSETRON 2 MG/ML
4 INJECTION INTRAMUSCULAR; INTRAVENOUS EVERY 6 HOURS PRN
Status: DISCONTINUED | OUTPATIENT
Start: 2025-01-12 | End: 2025-01-16 | Stop reason: HOSPADM

## 2025-01-12 RX ORDER — SODIUM CHLORIDE 0.9 % (FLUSH) 0.9 %
5-40 SYRINGE (ML) INJECTION EVERY 12 HOURS SCHEDULED
Status: DISCONTINUED | OUTPATIENT
Start: 2025-01-12 | End: 2025-01-16 | Stop reason: HOSPADM

## 2025-01-12 RX ORDER — 0.9 % SODIUM CHLORIDE 0.9 %
500 INTRAVENOUS SOLUTION INTRAVENOUS ONCE
Status: COMPLETED | OUTPATIENT
Start: 2025-01-12 | End: 2025-01-13

## 2025-01-12 RX ORDER — ACETAMINOPHEN 325 MG/1
650 TABLET ORAL EVERY 6 HOURS PRN
Status: DISCONTINUED | OUTPATIENT
Start: 2025-01-12 | End: 2025-01-16 | Stop reason: HOSPADM

## 2025-01-12 RX ORDER — SODIUM CHLORIDE 0.9 % (FLUSH) 0.9 %
5-40 SYRINGE (ML) INJECTION PRN
Status: DISCONTINUED | OUTPATIENT
Start: 2025-01-12 | End: 2025-01-16 | Stop reason: HOSPADM

## 2025-01-12 RX ORDER — DONEPEZIL HYDROCHLORIDE 5 MG/1
10 TABLET, FILM COATED ORAL DAILY
Status: DISCONTINUED | OUTPATIENT
Start: 2025-01-13 | End: 2025-01-15

## 2025-01-12 RX ORDER — ASPIRIN 81 MG/1
81 TABLET, CHEWABLE ORAL DAILY
Status: DISCONTINUED | OUTPATIENT
Start: 2025-01-13 | End: 2025-01-16 | Stop reason: HOSPADM

## 2025-01-12 RX ORDER — CALCIUM CARBONATE 500 MG/1
1 TABLET, CHEWABLE ORAL 2 TIMES DAILY
Status: DISCONTINUED | OUTPATIENT
Start: 2025-01-12 | End: 2025-01-16 | Stop reason: HOSPADM

## 2025-01-12 RX ORDER — ASCORBIC ACID 500 MG
500 TABLET ORAL DAILY
Status: DISCONTINUED | OUTPATIENT
Start: 2025-01-13 | End: 2025-01-16 | Stop reason: HOSPADM

## 2025-01-12 RX ORDER — ENOXAPARIN SODIUM 100 MG/ML
40 INJECTION SUBCUTANEOUS DAILY
Status: DISCONTINUED | OUTPATIENT
Start: 2025-01-13 | End: 2025-01-16 | Stop reason: HOSPADM

## 2025-01-12 RX ORDER — 0.9 % SODIUM CHLORIDE 0.9 %
500 INTRAVENOUS SOLUTION INTRAVENOUS ONCE
Status: COMPLETED | OUTPATIENT
Start: 2025-01-12 | End: 2025-01-12

## 2025-01-12 RX ORDER — ACETAMINOPHEN 650 MG/1
650 SUPPOSITORY RECTAL EVERY 6 HOURS PRN
Status: DISCONTINUED | OUTPATIENT
Start: 2025-01-12 | End: 2025-01-16 | Stop reason: HOSPADM

## 2025-01-12 RX ORDER — PANTOPRAZOLE SODIUM 40 MG/1
40 TABLET, DELAYED RELEASE ORAL
Status: DISCONTINUED | OUTPATIENT
Start: 2025-01-13 | End: 2025-01-16 | Stop reason: HOSPADM

## 2025-01-12 RX ORDER — ZINC SULFATE 50(220)MG
50 CAPSULE ORAL DAILY
Status: DISCONTINUED | OUTPATIENT
Start: 2025-01-13 | End: 2025-01-16 | Stop reason: HOSPADM

## 2025-01-12 RX ADMIN — SODIUM CHLORIDE 500 ML: 9 INJECTION, SOLUTION INTRAVENOUS at 20:17

## 2025-01-12 RX ADMIN — IOPAMIDOL 100 ML: 755 INJECTION, SOLUTION INTRAVENOUS at 18:18

## 2025-01-12 RX ADMIN — CALCIUM CARBONATE 500 MG: 500 TABLET, CHEWABLE ORAL at 23:51

## 2025-01-12 RX ADMIN — MIDODRINE HYDROCHLORIDE 10 MG: 5 TABLET ORAL at 22:28

## 2025-01-12 RX ADMIN — SODIUM CHLORIDE 500 ML: 9 INJECTION, SOLUTION INTRAVENOUS at 22:28

## 2025-01-12 RX ADMIN — Medication 1 TABLET: at 23:51

## 2025-01-12 RX ADMIN — MIDODRINE HYDROCHLORIDE 5 MG: 5 TABLET ORAL at 17:07

## 2025-01-12 RX ADMIN — DIVALPROEX SODIUM 125 MG: 125 CAPSULE, COATED PELLETS ORAL at 23:51

## 2025-01-12 RX ADMIN — ACETAMINOPHEN 1000 MG: 500 TABLET, FILM COATED ORAL at 17:06

## 2025-01-12 ASSESSMENT — LIFESTYLE VARIABLES
HOW OFTEN DO YOU HAVE A DRINK CONTAINING ALCOHOL: NEVER
HOW MANY STANDARD DRINKS CONTAINING ALCOHOL DO YOU HAVE ON A TYPICAL DAY: PATIENT DOES NOT DRINK

## 2025-01-12 ASSESSMENT — PAIN SCALES - GENERAL: PAINLEVEL_OUTOF10: 0

## 2025-01-12 ASSESSMENT — PAIN - FUNCTIONAL ASSESSMENT: PAIN_FUNCTIONAL_ASSESSMENT: 0-10

## 2025-01-13 LAB
ALBUMIN SERPL-MCNC: 2.3 G/DL (ref 3.5–5)
ALBUMIN/GLOB SERPL: 0.7 (ref 1.1–2.2)
ALP SERPL-CCNC: 76 U/L (ref 45–117)
ALT SERPL-CCNC: 14 U/L (ref 12–78)
ANION GAP SERPL CALC-SCNC: 6 MMOL/L (ref 2–12)
APPEARANCE UR: CLEAR
AST SERPL-CCNC: 21 U/L (ref 15–37)
BACTERIA URNS QL MICRO: NEGATIVE /HPF
BASOPHILS # BLD: 0.02 K/UL (ref 0–0.1)
BASOPHILS NFR BLD: 0.4 % (ref 0–1)
BILIRUB SERPL-MCNC: 0.5 MG/DL (ref 0.2–1)
BILIRUB UR QL: NEGATIVE
BUN SERPL-MCNC: 14 MG/DL (ref 6–20)
BUN/CREAT SERPL: 19 (ref 12–20)
CALCIUM SERPL-MCNC: 7.6 MG/DL (ref 8.5–10.1)
CHLORIDE SERPL-SCNC: 108 MMOL/L (ref 97–108)
CO2 SERPL-SCNC: 26 MMOL/L (ref 21–32)
COLOR UR: ABNORMAL
CREAT SERPL-MCNC: 0.75 MG/DL (ref 0.7–1.3)
DIFFERENTIAL METHOD BLD: ABNORMAL
EOSINOPHIL # BLD: 0.07 K/UL (ref 0–0.4)
EOSINOPHIL NFR BLD: 1.4 % (ref 0–7)
EPITH CASTS URNS QL MICRO: ABNORMAL /LPF
ERYTHROCYTE [DISTWIDTH] IN BLOOD BY AUTOMATED COUNT: 15.9 % (ref 11.5–14.5)
GLOBULIN SER CALC-MCNC: 3.2 G/DL (ref 2–4)
GLUCOSE SERPL-MCNC: 82 MG/DL (ref 65–100)
GLUCOSE UR STRIP.AUTO-MCNC: NEGATIVE MG/DL
HCT VFR BLD AUTO: 33.1 % (ref 36.6–50.3)
HGB BLD-MCNC: 10.1 G/DL (ref 12.1–17)
HGB UR QL STRIP: ABNORMAL
IMM GRANULOCYTES # BLD AUTO: 0.05 K/UL (ref 0–0.04)
IMM GRANULOCYTES NFR BLD AUTO: 1 % (ref 0–0.5)
KETONES UR QL STRIP.AUTO: NEGATIVE MG/DL
LEUKOCYTE ESTERASE UR QL STRIP.AUTO: NEGATIVE
LYMPHOCYTES # BLD: 0.32 K/UL (ref 0.8–3.5)
LYMPHOCYTES NFR BLD: 6.3 % (ref 12–49)
MAGNESIUM SERPL-MCNC: 2 MG/DL (ref 1.6–2.4)
MCH RBC QN AUTO: 29.5 PG (ref 26–34)
MCHC RBC AUTO-ENTMCNC: 30.5 G/DL (ref 30–36.5)
MCV RBC AUTO: 96.8 FL (ref 80–99)
MONOCYTES # BLD: 0.42 K/UL (ref 0–1)
MONOCYTES NFR BLD: 8.3 % (ref 5–13)
NEUTS SEG # BLD: 4.16 K/UL (ref 1.8–8)
NEUTS SEG NFR BLD: 82.6 % (ref 32–75)
NITRITE UR QL STRIP.AUTO: NEGATIVE
NRBC # BLD: 0 K/UL (ref 0–0.01)
NRBC BLD-RTO: 0 PER 100 WBC
PH UR STRIP: 6 (ref 5–8)
PLATELET # BLD AUTO: 285 K/UL (ref 150–400)
PMV BLD AUTO: 10.6 FL (ref 8.9–12.9)
POTASSIUM SERPL-SCNC: 3.5 MMOL/L (ref 3.5–5.1)
PROT SERPL-MCNC: 5.5 G/DL (ref 6.4–8.2)
PROT UR STRIP-MCNC: ABNORMAL MG/DL
RBC # BLD AUTO: 3.42 M/UL (ref 4.1–5.7)
RBC #/AREA URNS HPF: ABNORMAL /HPF (ref 0–5)
SODIUM SERPL-SCNC: 140 MMOL/L (ref 136–145)
SP GR UR REFRACTOMETRY: >1.03 (ref 1–1.03)
URINE CULTURE IF INDICATED: ABNORMAL
UROBILINOGEN UR QL STRIP.AUTO: 1 EU/DL (ref 0.2–1)
WBC # BLD AUTO: 5 K/UL (ref 4.1–11.1)
WBC URNS QL MICRO: ABNORMAL /HPF (ref 0–4)

## 2025-01-13 PROCEDURE — 85025 COMPLETE CBC W/AUTO DIFF WBC: CPT

## 2025-01-13 PROCEDURE — 36415 COLL VENOUS BLD VENIPUNCTURE: CPT

## 2025-01-13 PROCEDURE — 2580000003 HC RX 258: Performed by: PHYSICIAN ASSISTANT

## 2025-01-13 PROCEDURE — 80053 COMPREHEN METABOLIC PANEL: CPT

## 2025-01-13 PROCEDURE — 6360000002 HC RX W HCPCS: Performed by: STUDENT IN AN ORGANIZED HEALTH CARE EDUCATION/TRAINING PROGRAM

## 2025-01-13 PROCEDURE — 83735 ASSAY OF MAGNESIUM: CPT

## 2025-01-13 PROCEDURE — 2060000000 HC ICU INTERMEDIATE R&B

## 2025-01-13 PROCEDURE — 2500000003 HC RX 250 WO HCPCS: Performed by: STUDENT IN AN ORGANIZED HEALTH CARE EDUCATION/TRAINING PROGRAM

## 2025-01-13 PROCEDURE — 6370000000 HC RX 637 (ALT 250 FOR IP): Performed by: STUDENT IN AN ORGANIZED HEALTH CARE EDUCATION/TRAINING PROGRAM

## 2025-01-13 RX ORDER — 0.9 % SODIUM CHLORIDE 0.9 %
500 INTRAVENOUS SOLUTION INTRAVENOUS ONCE
Status: COMPLETED | OUTPATIENT
Start: 2025-01-13 | End: 2025-01-13

## 2025-01-13 RX ADMIN — CALCIUM CARBONATE 500 MG: 500 TABLET, CHEWABLE ORAL at 09:15

## 2025-01-13 RX ADMIN — DIVALPROEX SODIUM 125 MG: 125 CAPSULE, COATED PELLETS ORAL at 09:15

## 2025-01-13 RX ADMIN — SODIUM CHLORIDE, PRESERVATIVE FREE 10 ML: 5 INJECTION INTRAVENOUS at 21:55

## 2025-01-13 RX ADMIN — SERTRALINE HYDROCHLORIDE 50 MG: 50 TABLET ORAL at 09:15

## 2025-01-13 RX ADMIN — RISPERIDONE 0.25 MG: 0.25 TABLET, FILM COATED ORAL at 11:18

## 2025-01-13 RX ADMIN — MIDODRINE HYDROCHLORIDE 10 MG: 5 TABLET ORAL at 16:15

## 2025-01-13 RX ADMIN — MIDODRINE HYDROCHLORIDE 10 MG: 5 TABLET ORAL at 11:57

## 2025-01-13 RX ADMIN — CALCIUM CARBONATE 500 MG: 500 TABLET, CHEWABLE ORAL at 21:55

## 2025-01-13 RX ADMIN — ASPIRIN 81 MG: 81 TABLET, CHEWABLE ORAL at 09:15

## 2025-01-13 RX ADMIN — DIVALPROEX SODIUM 125 MG: 125 CAPSULE, COATED PELLETS ORAL at 21:54

## 2025-01-13 RX ADMIN — MIDODRINE HYDROCHLORIDE 10 MG: 5 TABLET ORAL at 09:14

## 2025-01-13 RX ADMIN — SODIUM CHLORIDE, PRESERVATIVE FREE 10 ML: 5 INJECTION INTRAVENOUS at 09:15

## 2025-01-13 RX ADMIN — OLANZAPINE 2.5 MG: 5 TABLET, FILM COATED ORAL at 21:54

## 2025-01-13 RX ADMIN — DONEPEZIL HYDROCHLORIDE 10 MG: 5 TABLET, FILM COATED ORAL at 09:14

## 2025-01-13 RX ADMIN — Medication 1 TABLET: at 21:55

## 2025-01-13 RX ADMIN — ACETAMINOPHEN 650 MG: 325 TABLET ORAL at 04:18

## 2025-01-13 RX ADMIN — ACETAMINOPHEN 650 MG: 325 TABLET ORAL at 16:15

## 2025-01-13 RX ADMIN — PANTOPRAZOLE SODIUM 40 MG: 40 TABLET, DELAYED RELEASE ORAL at 06:06

## 2025-01-13 RX ADMIN — ENOXAPARIN SODIUM 40 MG: 100 INJECTION SUBCUTANEOUS at 09:14

## 2025-01-13 RX ADMIN — Medication 1 TABLET: at 09:14

## 2025-01-13 RX ADMIN — OXYCODONE HYDROCHLORIDE AND ACETAMINOPHEN 500 MG: 500 TABLET ORAL at 09:15

## 2025-01-13 RX ADMIN — SODIUM CHLORIDE 500 ML: 9 INJECTION, SOLUTION INTRAVENOUS at 11:18

## 2025-01-13 RX ADMIN — ZINC SULFATE 220 MG (50 MG) CAPSULE 50 MG: CAPSULE at 09:15

## 2025-01-13 ASSESSMENT — PAIN DESCRIPTION - LOCATION
LOCATION: GENERALIZED
LOCATION: CHEST

## 2025-01-13 ASSESSMENT — PAIN DESCRIPTION - ORIENTATION
ORIENTATION: RIGHT;LEFT;ANTERIOR;POSTERIOR
ORIENTATION: RIGHT

## 2025-01-13 ASSESSMENT — PAIN DESCRIPTION - DESCRIPTORS
DESCRIPTORS: ACHING
DESCRIPTORS: ACHING;BURNING

## 2025-01-13 ASSESSMENT — PAIN DESCRIPTION - FREQUENCY: FREQUENCY: INTERMITTENT

## 2025-01-13 ASSESSMENT — PAIN SCALES - GENERAL: PAINLEVEL_OUTOF10: 3

## 2025-01-13 ASSESSMENT — PAIN - FUNCTIONAL ASSESSMENT: PAIN_FUNCTIONAL_ASSESSMENT: PREVENTS OR INTERFERES SOME ACTIVE ACTIVITIES AND ADLS

## 2025-01-13 ASSESSMENT — PAIN DESCRIPTION - PAIN TYPE: TYPE: ACUTE PAIN;CHRONIC PAIN

## 2025-01-13 ASSESSMENT — PAIN SCALES - WONG BAKER: WONGBAKER_NUMERICALRESPONSE: HURTS EVEN MORE

## 2025-01-13 NOTE — PLAN OF CARE
Problem: Safety - Adult  Goal: Free from fall injury  Outcome: Progressing  Flowsheets (Taken 1/13/2025 0057)  Free From Fall Injury: Instruct family/caregiver on patient safety     Problem: Discharge Planning  Goal: Discharge to home or other facility with appropriate resources  Outcome: Progressing  Flowsheets (Taken 1/13/2025 0109)  Discharge to home or other facility with appropriate resources: Identify barriers to discharge with patient and caregiver     Problem: Pain  Goal: Verbalizes/displays adequate comfort level or baseline comfort level  Outcome: Progressing     Problem: Skin/Tissue Integrity  Goal: Absence of new skin breakdown  Description: 1.  Monitor for areas of redness and/or skin breakdown  2.  Assess vascular access sites hourly  3.  Every 4-6 hours minimum:  Change oxygen saturation probe site  4.  Every 4-6 hours:  If on nasal continuous positive airway pressure, respiratory therapy assess nares and determine need for appliance change or resting period.  Outcome: Progressing

## 2025-01-13 NOTE — ED NOTES
Report given to CARMITA Carranza. Nurse was informed of reason for arrival, vitals, labs, medications, orders, procedures, results, anything left pending and current plan of action. Questions were asked and received prior to departure from the patient.

## 2025-01-13 NOTE — PROGRESS NOTES
Unable to complete medication reconciliation and pt preferred pharmacy due to patient confusion/history of dementia and patient unable to participate.

## 2025-01-13 NOTE — ED NOTES
This RN spoke to MD De La Paz about persisting hypotension att. Order for 500mL NS bolus and midodrine received att.

## 2025-01-13 NOTE — ED NOTES
Assumed care of pt at this time. Pt on monitor x3 with bed in low position, wheels locked, and call bell in reach. Plan of care discussed with pt at this time and all questions answered. VSS stable with increasing BP and breathing even and unlabored. No acute distress noted.       Pt in bed in low position with wheels locked on bed alarm with call bell in reach. Fall risk band on pt and fall magnet on door. Fall socks on as well and pt oreiented to room prevent falls.

## 2025-01-13 NOTE — ED NOTES
MD De La Paz at the bedside at this time. Pt BP 75/51. Pt asymptomatic at this time. Pt receiving 500mL NS bolus at this time.

## 2025-01-13 NOTE — H&P
Hospitalist Admission Note    NAME:Josue Cruz   : 1945   MRN: [id number]     Date/Time: 2025 10:35 PM    Patient PCP: No primary care provider on file.    *Please be aware this note is formulated with assistance from Dragon voice-recognition dictation software. Please excuse any errors that may be present*    ______________________________________________________________________  Given the patient's current clinical presentation, I have a high level of concern for decompensation if discharged from the emergency department.  Complex decision making was performed, which includes reviewing the patient's available past medical records, laboratory results, and x-ray films.       My assessment of this patient's clinical condition and my plan of care is as follows.    Problem List:  Patient Active Problem List   Diagnosis    Hypotension         Assessment / Plan:    COVID  Fever  - Found to be COVID+   - Unable to provide history or review of symptoms  - CT with no focal infiltrate and patient with no oxygen requirement  - Check procalcitonin   - Monitor symptomatically; if worsening or develops oxygen requirement would consider steroids   - High risk for decompensation  - Check UA to fully evaluate fever, more likely COVID related however at this point     Orthostatic hypotension  Hx of aortic stenosis with prior hx systolic HF   Hx of orthostatic hypotension with episodes of low BP during prior hospitalization. Torsemide was stopped during last hospitalization   Echo 2024 with EF 60%, grade 1 diastolic dysfunction, mild AR, moderate AS.   - BP 70s-80s/50s-60s in the ED, patient asymptomatic; MAPs have generally been >60 with some variation so far in ED   - He looks clinically dry on exam, no pulmonary edema  - Will trial IVF, care given diastolic dysfunction  - BREE hose  - Continue midodrine TID on schedule   - If MAP persistently low overnight, will have ICU evaluate   - Consider albumin if needed

## 2025-01-13 NOTE — ED NOTES
ED TO INPATIENT SBAR HANDOFF    Patient Name: Josue Cruz   Preferred Name: Josue  : 1945  79 y.o.   Family/Caregiver Present: no   Code Status Order: Full Code  PO Status: NPO:No  Telemetry Order:   C-SSRS: Risk of Suicide: No Risk  Sitter no  n/a  Restraints:     Sepsis Risk Score      Situation  Chief Complaint   Patient presents with    Abdominal Pain     Pt BIBEMS from [facility], pt had an unwitnessed GLF yesterday and wasn't seen anywhere. Memory care, pt c/o umbilicus pain tenderness. During transport c/o chest pain fever 102.8 F. Per EMS EKG was afib.      Brief Description of Patient's Condition: Covid 19 w/ hypotension   Mental Status: disoriented and alert  Arrived from:Long Term Care   Imaging:   CT CHEST ABDOMEN PELVIS W CONTRAST Additional Contrast? None   Final Result   Chronic bilateral rib fractures.   No evidence of pneumonia.   No acute intra-abdominal pathology.   Cholelithiasis without gallbladder inflammation.   Small left pleural effusion.      Electronically signed by MARY EDMONDS        Abnormal labs:   Abnormal Labs Reviewed   COVID-19 & INFLUENZA COMBO - Abnormal; Notable for the following components:       Result Value    SARS-CoV-2, PCR Detected (*)     All other components within normal limits   COMPREHENSIVE METABOLIC PANEL - Abnormal; Notable for the following components:    Glucose 109 (*)     Total Protein 6.2 (*)     Albumin 2.7 (*)     Albumin/Globulin Ratio 0.8 (*)     All other components within normal limits   CBC WITH AUTO DIFFERENTIAL - Abnormal; Notable for the following components:    RBC 3.73 (*)     Hemoglobin 11.1 (*)     Hematocrit 35.7 (*)     RDW 16.0 (*)     Neutrophils % 88.1 (*)     Lymphocytes % 4.7 (*)     Lymphocytes Absolute 0.29 (*)     All other components within normal limits       Background  Allergies: No Known Allergies  History: History reviewed. No pertinent past medical history.    Assessment  Vitals: MEWS Score: 4  Level of

## 2025-01-13 NOTE — PROGRESS NOTES
BP flow sheet since yesterday afternoon.  BP's trend on the soft/low side.  Lower end this am and 500 cc bolus ordered.  BP improved some but still on the low side.  Notified NP that patient is having some difficulty swallowing and is more lethargic than earlier in shift.  Patient will wake and is oriented to self and  - Same as morning assessment.    Speech consult ordered and will continue to monitor patient closely.     1600  Patient awake and alert with baseline behaviors.  No more lethargy.  Patient confused and oriented to self.  Patient will scream until nursing comes into the room and then patient relaxes and symptoms of dyspnea or complaints of pain dissipate.      Patient took sips of water with pills with no coughing and ate ice cream without difficulty.  Suction set up in room in case of another episode of coughing while eating.    This evening patient more restless and calling for nursing.  Pleasantly confused and appreciated nursing presence.      Flowsheet History   Personalize  2024 0000 EST - 2025 1442 EST     Date/Time Temp Source Pulse Heart Rate Source Respirations Peak Flow BP MAP (Calculated) MAP (mmHg) BP Location   25 1152 Oral -- -- -- -- 92/47 Important   62 -- Left upper arm   25 1130 -- -- -- -- -- -- -- -- Right upper arm   25 1121 Oral 68 -- 25 -- 83/34 Important  50 46 Important  --   25 0908 Oral 63 -- 20 -- 88/54 Important  65 61 Important  --   25 0605 Oral 67 -- 19 -- -- -- -- --   25 0400 Oral 73 Monitor 18 -- 95/54 Important  68 63 Important  Right upper arm   25 0057 Oral 77 Monitor 25 -- 102/58 Important  73 68 Right upper arm   25 0000 -- 66 -- 21 -- 100/50 Important  67 66 --   25 2345 -- -- -- 13 -- 89/53 Important  65 64 Important  --   25 2315 Oral 65 -- 19 -- 95/50 Important  65 62 Important  --   25 2300 -- 68 -- 22 -- 81/50 Important  60 60 Important  --   25 2230 -- 79 --  --

## 2025-01-14 ENCOUNTER — APPOINTMENT (OUTPATIENT)
Facility: HOSPITAL | Age: 80
End: 2025-01-14
Payer: OTHER GOVERNMENT

## 2025-01-14 LAB
ALBUMIN SERPL-MCNC: 2.2 G/DL (ref 3.5–5)
ALBUMIN/GLOB SERPL: 0.6 (ref 1.1–2.2)
ALP SERPL-CCNC: 74 U/L (ref 45–117)
ALT SERPL-CCNC: 17 U/L (ref 12–78)
ANION GAP SERPL CALC-SCNC: 4 MMOL/L (ref 2–12)
AST SERPL-CCNC: 27 U/L (ref 15–37)
BASOPHILS # BLD: 0 K/UL (ref 0–0.1)
BASOPHILS NFR BLD: 0 % (ref 0–1)
BILIRUB SERPL-MCNC: 0.5 MG/DL (ref 0.2–1)
BUN SERPL-MCNC: 15 MG/DL (ref 6–20)
BUN/CREAT SERPL: 21 (ref 12–20)
CALCIUM SERPL-MCNC: 8.1 MG/DL (ref 8.5–10.1)
CHLORIDE SERPL-SCNC: 110 MMOL/L (ref 97–108)
CO2 SERPL-SCNC: 25 MMOL/L (ref 21–32)
CREAT SERPL-MCNC: 0.71 MG/DL (ref 0.7–1.3)
DIFFERENTIAL METHOD BLD: ABNORMAL
EOSINOPHIL # BLD: 0.27 K/UL (ref 0–0.4)
EOSINOPHIL NFR BLD: 5 % (ref 0–7)
ERYTHROCYTE [DISTWIDTH] IN BLOOD BY AUTOMATED COUNT: 15.9 % (ref 11.5–14.5)
GLOBULIN SER CALC-MCNC: 3.4 G/DL (ref 2–4)
GLUCOSE BLD STRIP.AUTO-MCNC: 88 MG/DL (ref 65–117)
GLUCOSE BLD STRIP.AUTO-MCNC: 89 MG/DL (ref 65–117)
GLUCOSE SERPL-MCNC: 74 MG/DL (ref 65–100)
HCT VFR BLD AUTO: 32.9 % (ref 36.6–50.3)
HGB BLD-MCNC: 9.9 G/DL (ref 12.1–17)
IMM GRANULOCYTES # BLD AUTO: 0 K/UL (ref 0–0.04)
IMM GRANULOCYTES NFR BLD AUTO: 0 % (ref 0–0.5)
LYMPHOCYTES # BLD: 0.92 K/UL (ref 0.8–3.5)
LYMPHOCYTES NFR BLD: 17 % (ref 12–49)
MAGNESIUM SERPL-MCNC: 1.9 MG/DL (ref 1.6–2.4)
MCH RBC QN AUTO: 29.5 PG (ref 26–34)
MCHC RBC AUTO-ENTMCNC: 30.1 G/DL (ref 30–36.5)
MCV RBC AUTO: 97.9 FL (ref 80–99)
MONOCYTES # BLD: 0.32 K/UL (ref 0–1)
MONOCYTES NFR BLD: 6 % (ref 5–13)
NEUTS BAND NFR BLD MANUAL: 1 %
NEUTS SEG # BLD: 3.89 K/UL (ref 1.8–8)
NEUTS SEG NFR BLD: 71 % (ref 32–75)
NRBC # BLD: 0 K/UL (ref 0–0.01)
NRBC BLD-RTO: 0 PER 100 WBC
PLATELET # BLD AUTO: 245 K/UL (ref 150–400)
PMV BLD AUTO: 10.7 FL (ref 8.9–12.9)
POTASSIUM SERPL-SCNC: 3.2 MMOL/L (ref 3.5–5.1)
PROT SERPL-MCNC: 5.6 G/DL (ref 6.4–8.2)
RBC # BLD AUTO: 3.36 M/UL (ref 4.1–5.7)
RBC MORPH BLD: ABNORMAL
SERVICE CMNT-IMP: NORMAL
SERVICE CMNT-IMP: NORMAL
SODIUM SERPL-SCNC: 139 MMOL/L (ref 136–145)
WBC # BLD AUTO: 5.4 K/UL (ref 4.1–11.1)

## 2025-01-14 PROCEDURE — 85025 COMPLETE CBC W/AUTO DIFF WBC: CPT

## 2025-01-14 PROCEDURE — 6370000000 HC RX 637 (ALT 250 FOR IP): Performed by: PHYSICIAN ASSISTANT

## 2025-01-14 PROCEDURE — 82962 GLUCOSE BLOOD TEST: CPT

## 2025-01-14 PROCEDURE — 2580000003 HC RX 258: Performed by: NURSE PRACTITIONER

## 2025-01-14 PROCEDURE — 92610 EVALUATE SWALLOWING FUNCTION: CPT

## 2025-01-14 PROCEDURE — 6360000002 HC RX W HCPCS: Performed by: STUDENT IN AN ORGANIZED HEALTH CARE EDUCATION/TRAINING PROGRAM

## 2025-01-14 PROCEDURE — 6370000000 HC RX 637 (ALT 250 FOR IP): Performed by: STUDENT IN AN ORGANIZED HEALTH CARE EDUCATION/TRAINING PROGRAM

## 2025-01-14 PROCEDURE — 80053 COMPREHEN METABOLIC PANEL: CPT

## 2025-01-14 PROCEDURE — 2500000003 HC RX 250 WO HCPCS: Performed by: STUDENT IN AN ORGANIZED HEALTH CARE EDUCATION/TRAINING PROGRAM

## 2025-01-14 PROCEDURE — 36415 COLL VENOUS BLD VENIPUNCTURE: CPT

## 2025-01-14 PROCEDURE — 51798 US URINE CAPACITY MEASURE: CPT

## 2025-01-14 PROCEDURE — 71045 X-RAY EXAM CHEST 1 VIEW: CPT

## 2025-01-14 PROCEDURE — 83735 ASSAY OF MAGNESIUM: CPT

## 2025-01-14 PROCEDURE — 6370000000 HC RX 637 (ALT 250 FOR IP): Performed by: NURSE PRACTITIONER

## 2025-01-14 PROCEDURE — 2060000000 HC ICU INTERMEDIATE R&B

## 2025-01-14 PROCEDURE — 6360000002 HC RX W HCPCS: Performed by: NURSE PRACTITIONER

## 2025-01-14 RX ORDER — 0.9 % SODIUM CHLORIDE 0.9 %
500 INTRAVENOUS SOLUTION INTRAVENOUS ONCE
Status: COMPLETED | OUTPATIENT
Start: 2025-01-14 | End: 2025-01-14

## 2025-01-14 RX ORDER — FUROSEMIDE 10 MG/ML
20 INJECTION INTRAMUSCULAR; INTRAVENOUS ONCE
Status: COMPLETED | OUTPATIENT
Start: 2025-01-14 | End: 2025-01-14

## 2025-01-14 RX ORDER — POTASSIUM CHLORIDE 1500 MG/1
40 TABLET, EXTENDED RELEASE ORAL ONCE
Status: COMPLETED | OUTPATIENT
Start: 2025-01-14 | End: 2025-01-14

## 2025-01-14 RX ORDER — LIDOCAINE 4 G/G
1 PATCH TOPICAL NIGHTLY
Status: DISCONTINUED | OUTPATIENT
Start: 2025-01-14 | End: 2025-01-16 | Stop reason: HOSPADM

## 2025-01-14 RX ORDER — MIDODRINE HYDROCHLORIDE 5 MG/1
10 TABLET ORAL ONCE
Status: COMPLETED | OUTPATIENT
Start: 2025-01-14 | End: 2025-01-14

## 2025-01-14 RX ADMIN — PANTOPRAZOLE SODIUM 40 MG: 40 TABLET, DELAYED RELEASE ORAL at 07:15

## 2025-01-14 RX ADMIN — MIDODRINE HYDROCHLORIDE 10 MG: 5 TABLET ORAL at 10:11

## 2025-01-14 RX ADMIN — MIDODRINE HYDROCHLORIDE 10 MG: 5 TABLET ORAL at 03:47

## 2025-01-14 RX ADMIN — OXYCODONE HYDROCHLORIDE AND ACETAMINOPHEN 500 MG: 500 TABLET ORAL at 10:11

## 2025-01-14 RX ADMIN — SERTRALINE HYDROCHLORIDE 50 MG: 50 TABLET ORAL at 10:11

## 2025-01-14 RX ADMIN — MIDODRINE HYDROCHLORIDE 10 MG: 5 TABLET ORAL at 19:01

## 2025-01-14 RX ADMIN — ASPIRIN 81 MG: 81 TABLET, CHEWABLE ORAL at 10:11

## 2025-01-14 RX ADMIN — OLANZAPINE 2.5 MG: 5 TABLET, FILM COATED ORAL at 21:21

## 2025-01-14 RX ADMIN — DIVALPROEX SODIUM 125 MG: 125 CAPSULE, COATED PELLETS ORAL at 10:11

## 2025-01-14 RX ADMIN — DONEPEZIL HYDROCHLORIDE 10 MG: 5 TABLET, FILM COATED ORAL at 10:11

## 2025-01-14 RX ADMIN — SODIUM CHLORIDE, PRESERVATIVE FREE 10 ML: 5 INJECTION INTRAVENOUS at 21:25

## 2025-01-14 RX ADMIN — ZINC SULFATE 220 MG (50 MG) CAPSULE 50 MG: CAPSULE at 10:11

## 2025-01-14 RX ADMIN — CALCIUM CARBONATE 500 MG: 500 TABLET, CHEWABLE ORAL at 21:21

## 2025-01-14 RX ADMIN — Medication 1 TABLET: at 21:23

## 2025-01-14 RX ADMIN — ENOXAPARIN SODIUM 40 MG: 100 INJECTION SUBCUTANEOUS at 10:11

## 2025-01-14 RX ADMIN — SODIUM CHLORIDE, PRESERVATIVE FREE 10 ML: 5 INJECTION INTRAVENOUS at 10:13

## 2025-01-14 RX ADMIN — POTASSIUM CHLORIDE 40 MEQ: 1500 TABLET, EXTENDED RELEASE ORAL at 10:11

## 2025-01-14 RX ADMIN — ACETAMINOPHEN 650 MG: 325 TABLET ORAL at 21:21

## 2025-01-14 RX ADMIN — ACETAMINOPHEN 650 MG: 325 TABLET ORAL at 00:22

## 2025-01-14 RX ADMIN — DIVALPROEX SODIUM 125 MG: 125 CAPSULE, COATED PELLETS ORAL at 21:22

## 2025-01-14 RX ADMIN — SODIUM CHLORIDE 500 ML: 9 INJECTION, SOLUTION INTRAVENOUS at 03:49

## 2025-01-14 RX ADMIN — FUROSEMIDE 20 MG: 10 INJECTION, SOLUTION INTRAMUSCULAR; INTRAVENOUS at 23:52

## 2025-01-14 RX ADMIN — Medication 1 TABLET: at 10:11

## 2025-01-14 RX ADMIN — RISPERIDONE 0.25 MG: 0.25 TABLET, FILM COATED ORAL at 10:11

## 2025-01-14 RX ADMIN — MIDODRINE HYDROCHLORIDE 10 MG: 5 TABLET ORAL at 11:39

## 2025-01-14 RX ADMIN — CALCIUM CARBONATE 500 MG: 500 TABLET, CHEWABLE ORAL at 10:11

## 2025-01-14 ASSESSMENT — PAIN DESCRIPTION - LOCATION
LOCATION: ABDOMEN
LOCATION: BACK

## 2025-01-14 ASSESSMENT — PAIN SCALES - GENERAL
PAINLEVEL_OUTOF10: 0
PAINLEVEL_OUTOF10: 7
PAINLEVEL_OUTOF10: 4

## 2025-01-14 ASSESSMENT — PAIN DESCRIPTION - ORIENTATION: ORIENTATION: MID

## 2025-01-14 ASSESSMENT — PAIN DESCRIPTION - DESCRIPTORS
DESCRIPTORS: ACHING
DESCRIPTORS: ACHING;GNAWING

## 2025-01-14 ASSESSMENT — PAIN - FUNCTIONAL ASSESSMENT
PAIN_FUNCTIONAL_ASSESSMENT: ACTIVITIES ARE NOT PREVENTED
PAIN_FUNCTIONAL_ASSESSMENT: ACTIVITIES ARE NOT PREVENTED

## 2025-01-14 ASSESSMENT — PAIN DESCRIPTION - FREQUENCY: FREQUENCY: INTERMITTENT

## 2025-01-14 ASSESSMENT — PAIN DESCRIPTION - PAIN TYPE: TYPE: ACUTE PAIN;CHRONIC PAIN

## 2025-01-14 NOTE — PROGRESS NOTES
Hospitalist Progress Note        Demographics    Patient Name  Josue Cruz   Date of Birth 1945   Medical Record Number  [id number]      Age  79 y.o.   PCP No primary care provider on file.   Admit date:  1/12/2025  4:11 PM     Room Number  2158/01  @ [facility]           Admission Diagnoses:  Hypotension   Admission Summary:  \" Josue Cruz is a 79 y.o.  male with PMHx significant for  has no past medical history on file. who presents with the above chief complaint.      We were asked to admit for work up and further evaluation.      Patient presenting following fall and fever. Was at memory care unit yesterday when patient had unwitnessed fall. Per EMS, complained of abdominal pain en route however denies any pain currently. Also noted to have fever to 102.8 while in transport.      On arrival, patient denies any current pain. States he does not know why he is here. Denies any chest pain, denies any SOB, no URI symptoms. No dysuria.      Patient with hx of dementia from memory care unit and is poor historian. \"     Assessment and plan:     Fever, suspect secondary to COVID 19 virus, POA  - CT with no focal infiltrates. He is not requiring oxygen  - procalcitonin WNL  - High risk for decompensation in this elderly pt with hx asthma and dementia  - supportive care. 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 Torsemide was stopped during last hospitalization   Echo 12/2024 with EF 60%, grade 1 diastolic dysfunction, mild AR, moderate AS.   - BP as low as 70/40s --> IVF boluses and increased midodrine to 10mg TID. Now BP closer to 90-100s/50. MAPs have generally been >60 with some variation. He remains nontoxic appearing and clinically asymptomatic of  1130 [P.O.:630; IV Piggyback:500]  Out: -   I/O this shift:  In: 600 [P.O.:600]  Out: -         Physical Exam:             General:  Alert, cooperative,   well noursished,   well developed,   appears stated age    Ears/Eyes:  Hearing intact  Sclera and conjunctiva nml.  PERRL   Mouth/Throat:  Mucous membranes normal pink and dry  Oral pharynx clear        Lungs:  No respiratory distress. Currently on RA. Symmetric chest rise.  Lungs CTAB   CVS:  Regular rate and rhythm  + systolic murmur. No click, rub or gallop    Palpable pedal pulses  bilaterally   Abdomen:  Soft, non-tender. No rebound or guarding.  Bowel sounds normal     Extremities:    No edema noted   No calf tenderness   Skin:  No rash.  No cyanosis, jaundice   Lymph nodes:  Not examined    Musculoskeletal Muscle bulk nml   Neuro Face symmetrical. No slurred speech. Moving all extremities. A&O x3.      Psych:  Alert and oriented,   normal mood & affect        Medications reviewed   Scheduled Meds:   midodrine  10 mg Oral TID WC    ascorbic acid  500 mg Oral Daily    aspirin  81 mg Oral Daily    calcium carbonate  1 tablet Oral BID    oyster shell calcium w/D  1 tablet Oral BID    divalproex  125 mg Oral BID    donepezil  10 mg Oral Daily    pantoprazole  40 mg Oral QAM AC    sertraline  50 mg Oral Daily    zinc sulfate  50 mg Oral Daily    risperiDONE  0.25 mg Oral Daily    sodium chloride flush  5-40 mL IntraVENous 2 times per day    enoxaparin  40 mg SubCUTAneous Daily    OLANZapine  2.5 mg Oral Nightly     Continuous Infusions:   sodium chloride       PRN Meds:.sodium chloride flush, sodium chloride, ondansetron, acetaminophen **OR** acetaminophen       Relevant other information:   Results       Procedure Component Value Units Date/Time    Blood Culture 2 [[id number]] Collected: 01/12/25 1647    Order Status: Completed Specimen: Blood Updated: 01/12/25 2157     Special Requests NO SPECIAL REQUESTS        Culture NO GROWTH <24 HRS       COVID-19 &  Influenza Combo [[id number]]  (Abnormal) Collected: 01/12/25 1647    Order Status: Completed Specimen: Nasopharyngeal Updated: 01/12/25 1742     Source Nasopharyngeal        SARS-CoV-2, PCR Detected        Comment: Positive results are indicative of the presence of SARS-CoV-2.  Clinical correlation with patient history and other diagnostic information is necessary to determine patient infection status.  Positive results do not rule out bacterial infection or co-infection with other pathogens.        Rapid Influenza A By PCR Not detected        Rapid Influenza B By PCR Not detected        Comment: NOTE: Influenza A and Influenza B negative results should be considered presumptive in samples that have a positive SARS-CoV-2 result. Consider re-testing with an alternate FDA-approved test if clinically indicated.     Testing was performed using juan pablo Ale SARS-CoV-2 and Influenza A/B nucleic acid assay.  This test is a multiplex Real-Time Reverse Transcriptase Polymerase Chain Reaction (RT-PCR) based in vitro diagnostic test intended for the qualitative detection of nucleic acids from SARS-CoV-2, Influenza A, and Influenza B in nasopharyngeal and nasal swab specimens for use under the FDA's Emergency Use Authorization (EUA) only.     Fact sheet for Patients: https://www.fda.gov/media/580210/download  Fact sheet for Healthcare Providers: https://www.fda.fov/media/718708/download         Blood Culture 1 [[id number]] Collected: 01/12/25 1630    Order Status: Completed Specimen: Blood Updated: 01/12/25 2156     Special Requests NO SPECIAL REQUESTS        Culture NO GROWTH <24 HRS               Recent Labs     01/12/25  1648 01/13/25  0414   WBC 6.2 5.0   HGB 11.1* 10.1*   HCT 35.7* 33.1*    285     Recent Labs     01/12/25  1647 01/13/25  0414    140   K 3.9 3.5    108   CO2 28 26   BUN 14 14   MG  --  2.0   ALT 17 14      No results found for: \"TSH\"      Other medical conditions are listed in the active

## 2025-01-14 NOTE — PLAN OF CARE
Problem: Safety - Adult  Goal: Free from fall injury  1/13/2025 2158 by Kimberlee Copeland RN  Outcome: Progressing  1/13/2025 2028 by Rafia Bowman RN  Outcome: Progressing     Problem: Discharge Planning  Goal: Discharge to home or other facility with appropriate resources  1/13/2025 2158 by Kimberlee Copeland RN  Outcome: Progressing  1/13/2025 2028 by Rafia Bowman RN  Outcome: Progressing     Problem: Pain  Goal: Verbalizes/displays adequate comfort level or baseline comfort level  1/13/2025 2158 by Kimberlee Copeland RN  Outcome: Progressing  1/13/2025 2028 by Rafia Bowman RN  Outcome: Progressing     Problem: Skin/Tissue Integrity  Goal: Absence of new skin breakdown  Description: 1.  Monitor for areas of redness and/or skin breakdown  2.  Assess vascular access sites hourly  3.  Every 4-6 hours minimum:  Change oxygen saturation probe site  4.  Every 4-6 hours:  If on nasal continuous positive airway pressure, respiratory therapy assess nares and determine need for appliance change or resting period.  1/13/2025 2158 by Kimberlee Copeland RN  Outcome: Progressing  1/13/2025 2028 by Rafia Bowman RN  Outcome: Progressing

## 2025-01-14 NOTE — PROGRESS NOTES
Speech LAnguage Pathology EVALUATION/DISCHARGE    Patient: Josue Cruz (79 y.o. male)  Date: 1/14/2025  Primary Diagnosis: Hypotension [I95.9]  Chronic asymptomatic hypotension [I95.89]  COVID-19 [U07.1]       Precautions: Droplet plus (COVID-19)                    ASSESSMENT :  The patient presents with a functional oropharyngeal swallow. Note coughing observed prior to PO intake, suspect related to COVID-19 infection. Patient reports he wears dentures at baseline although they are not currently at hospital. Despite this, patient's mastication assessed to be within functional limits for regular consistencies. RN at bedside for medication administration. No overt difficulty or overt clinical signs of aspiration observed. Given patient's functional oral/pharyngeal swallow, recommend continuation of diet as outlined below.    Patient will be discharged from skilled speech-language pathology services at this time.     PLAN :  Recommendations and Planned Interventions:  Diet: Regular and thin liquids  -- Medication as tolerated  -- Do not hold PO intake with coughing as patient with COVID-19 and has a functional oropharyngeal swallow       Acute SLP Services: No, patient will be discharged from acute skilled speech-language pathology at this time.  Discharge Recommendations: No, additional SLP treatment not indicated at discharge     SUBJECTIVE:   Patient stated, “I'm the luckiest harvinder in the world.”    OBJECTIVE:   History reviewed. No pertinent past medical history.History reviewed. No pertinent surgical history.  Prior Level of Function/Home Situation:        Baseline Assessment:  Current Diet : Regular  Current Liquid Diet : Thin  Prior Dysphagia History: Patient reports history of several esophageal dilations  Patient Complaint: None stated    Cognitive and Communication Status:  Neurologic State: Alert  Orientation Level: Oriented to person, Oriented to place, and Oriented to situation  Cognition: Follows

## 2025-01-14 NOTE — PROGRESS NOTES
Hospitalist Progress Note        Demographics    Patient Name  Josue Cruz   Date of Birth 1945   Medical Record Number  [id number]      Age  79 y.o.   PCP No primary care provider on file.   Admit date:  1/12/2025  4:11 PM     Room Number  2158/01  @ [facility]           Admission Diagnoses:  Hypotension   Admission Summary:  \" Josue Cruz is a 79 y.o.  male with PMHx significant for  has no past medical history on file. who presents with the above chief complaint.      We were asked to admit for work up and further evaluation.      Patient presenting following fall and fever. Was at memory care unit yesterday when patient had unwitnessed fall. Per EMS, complained of abdominal pain en route however denies any pain currently. Also noted to have fever to 102.8 while in transport.      On arrival, patient denies any current pain. States he does not know why he is here. Denies any chest pain, denies any SOB, no URI symptoms. No dysuria.      Patient with hx of dementia from memory care unit and is poor historian. \"      Assessment and plan:      Fever, suspect secondary to COVID 19 virus, POA  - CT with no focal infiltrates. He is not requiring oxygen  - procalcitonin WNL  - High risk for decompensation in this elderly pt with hx asthma and dementia  - supportive care. 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 Torsemide was stopped during last hospitalization   Echo 12/2024 with EF 60%, grade 1 diastolic dysfunction, mild AR, moderate AS.   - BP as low as 70/40s --> IVF boluses and increased midodrine to 10mg TID. Now BP closer to 90-100s/50. MAPs have generally been >60 with some variation. He remains nontoxic appearing and clinically asymptomatic of

## 2025-01-14 NOTE — CARE COORDINATION
Care Management Initial Assessment       RUR: 19%  Readmission? No  1st IM letter given? Yes      Patient is a LTC resident at [place]. Plan to return.          01/14/25 2272   Service Assessment   Patient Orientation Other (see comment)  (dementia)   Cognition Dementia / Early Alzheimer's   History Provided By Medical Record   Primary Caregiver Other (Comment)  (LTC)   Accompanied By/Relationship none   Support Systems Other (Comment)   Patient's Healthcare Decision Maker is: Patient Declined (Legal Next of Kin Remains as Decision Maker)   PCP Verified by CM Yes   Last Visit to PCP Within last 3 months   Prior Functional Level Assistance with the following:;Bathing;Dressing;Toileting;Feeding;Cooking;Housework;Shopping;Mobility   Current Functional Level Assistance with the following:;Mobility;Shopping;Housework;Cooking;Feeding;Toileting;Dressing;Bathing   Can patient return to prior living arrangement Yes   Family able to assist with home care needs: No   Would you like for me to discuss the discharge plan with any other family members/significant others, and if so, who? No   Financial Resources [place] ([place]   Social/Functional History   Lives With Other (Comment)  (LTC)   Type of Home Facility  ([facility])   Home Access Level entry   Home Equipment None   Active  No   Patient's  Info facility/[facility]   Discharge Planning   Patient expects to be discharged to: House   Services At/After Discharge   Mode of Transport at Discharge BLS   Confirm Follow Up Transport Other (see comment)   Condition of Participation: Discharge Planning   The Plan for Transition of Care is related to the following treatment goals: LTC         ALLYSSA Cox, CM  [phone]

## 2025-01-14 NOTE — PROGRESS NOTES
Nursing contacted Nocturnist/cross cover provider via non-urgent messaging system [employer] and notified patient bp 78/41 map 50 asymptomatic, this is reported the recheck bp, states midodrine and 500ml ivf bolus given during dayshift for similar, covid POA. No other concerns reported. No acute distress reported. No other information provided by nurse. VSS.     Ordered ns 500ml bolus x1, midodrine 10mg po x1, appears highest bp recorded is 102/58 and mainly sbp 80s documented with some 70s and 90s. Will defer further evaluation/management to the day shift primary attending care team. Patient denies any further complaints or concerns.     Nursing to notify Hospitalist for further/continued concerns. Will remain available overnight for further concerns if nursing/patient needs. Please note, there are RRT systems in this hospital in place that if nursing has acute or critical patient condition change or concern, this is to help facilitate and notify that patient needs immediate bedside evaluation by a provider.     Non-billable note.

## 2025-01-15 LAB
ALBUMIN SERPL-MCNC: 2.4 G/DL (ref 3.5–5)
ALBUMIN/GLOB SERPL: 0.7 (ref 1.1–2.2)
ALP SERPL-CCNC: 80 U/L (ref 45–117)
ALT SERPL-CCNC: 18 U/L (ref 12–78)
ANION GAP SERPL CALC-SCNC: 5 MMOL/L (ref 2–12)
AST SERPL-CCNC: 23 U/L (ref 15–37)
BASOPHILS # BLD: 0.05 K/UL (ref 0–0.1)
BASOPHILS NFR BLD: 1 % (ref 0–1)
BILIRUB SERPL-MCNC: 0.6 MG/DL (ref 0.2–1)
BUN SERPL-MCNC: 13 MG/DL (ref 6–20)
BUN/CREAT SERPL: 18 (ref 12–20)
CALCIUM SERPL-MCNC: 8 MG/DL (ref 8.5–10.1)
CHLORIDE SERPL-SCNC: 105 MMOL/L (ref 97–108)
CO2 SERPL-SCNC: 26 MMOL/L (ref 21–32)
CREAT SERPL-MCNC: 0.71 MG/DL (ref 0.7–1.3)
DIFFERENTIAL METHOD BLD: ABNORMAL
EOSINOPHIL # BLD: 0.1 K/UL (ref 0–0.4)
EOSINOPHIL NFR BLD: 2 % (ref 0–7)
ERYTHROCYTE [DISTWIDTH] IN BLOOD BY AUTOMATED COUNT: 15.7 % (ref 11.5–14.5)
GLOBULIN SER CALC-MCNC: 3.4 G/DL (ref 2–4)
GLUCOSE BLD STRIP.AUTO-MCNC: 185 MG/DL (ref 65–117)
GLUCOSE BLD STRIP.AUTO-MCNC: 67 MG/DL (ref 65–117)
GLUCOSE SERPL-MCNC: 68 MG/DL (ref 65–100)
HCT VFR BLD AUTO: 32.7 % (ref 36.6–50.3)
HGB BLD-MCNC: 10.1 G/DL (ref 12.1–17)
IMM GRANULOCYTES # BLD AUTO: 0 K/UL (ref 0–0.04)
IMM GRANULOCYTES NFR BLD AUTO: 0 % (ref 0–0.5)
LYMPHOCYTES # BLD: 0.82 K/UL (ref 0.8–3.5)
LYMPHOCYTES NFR BLD: 16 % (ref 12–49)
MAGNESIUM SERPL-MCNC: 1.9 MG/DL (ref 1.6–2.4)
MCH RBC QN AUTO: 29.9 PG (ref 26–34)
MCHC RBC AUTO-ENTMCNC: 30.9 G/DL (ref 30–36.5)
MCV RBC AUTO: 96.7 FL (ref 80–99)
MONOCYTES # BLD: 0.36 K/UL (ref 0–1)
MONOCYTES NFR BLD: 7 % (ref 5–13)
NEUTS BAND NFR BLD MANUAL: 2 %
NEUTS SEG # BLD: 3.77 K/UL (ref 1.8–8)
NEUTS SEG NFR BLD: 72 % (ref 32–75)
NRBC # BLD: 0 K/UL (ref 0–0.01)
NRBC BLD-RTO: 0 PER 100 WBC
PLATELET # BLD AUTO: 260 K/UL (ref 150–400)
PMV BLD AUTO: 10.6 FL (ref 8.9–12.9)
POTASSIUM SERPL-SCNC: 3.2 MMOL/L (ref 3.5–5.1)
PROT SERPL-MCNC: 5.8 G/DL (ref 6.4–8.2)
RBC # BLD AUTO: 3.38 M/UL (ref 4.1–5.7)
RBC MORPH BLD: ABNORMAL
SERVICE CMNT-IMP: ABNORMAL
SERVICE CMNT-IMP: NORMAL
SODIUM SERPL-SCNC: 136 MMOL/L (ref 136–145)
WBC # BLD AUTO: 5.1 K/UL (ref 4.1–11.1)

## 2025-01-15 PROCEDURE — 83735 ASSAY OF MAGNESIUM: CPT

## 2025-01-15 PROCEDURE — 82962 GLUCOSE BLOOD TEST: CPT

## 2025-01-15 PROCEDURE — 2500000003 HC RX 250 WO HCPCS: Performed by: STUDENT IN AN ORGANIZED HEALTH CARE EDUCATION/TRAINING PROGRAM

## 2025-01-15 PROCEDURE — 2500000003 HC RX 250 WO HCPCS

## 2025-01-15 PROCEDURE — 6360000002 HC RX W HCPCS: Performed by: STUDENT IN AN ORGANIZED HEALTH CARE EDUCATION/TRAINING PROGRAM

## 2025-01-15 PROCEDURE — 6370000000 HC RX 637 (ALT 250 FOR IP): Performed by: PHYSICIAN ASSISTANT

## 2025-01-15 PROCEDURE — 6370000000 HC RX 637 (ALT 250 FOR IP): Performed by: STUDENT IN AN ORGANIZED HEALTH CARE EDUCATION/TRAINING PROGRAM

## 2025-01-15 PROCEDURE — 1100000000 HC RM PRIVATE

## 2025-01-15 PROCEDURE — 85025 COMPLETE CBC W/AUTO DIFF WBC: CPT

## 2025-01-15 PROCEDURE — 80053 COMPREHEN METABOLIC PANEL: CPT

## 2025-01-15 PROCEDURE — 6370000000 HC RX 637 (ALT 250 FOR IP): Performed by: NURSE PRACTITIONER

## 2025-01-15 PROCEDURE — 36415 COLL VENOUS BLD VENIPUNCTURE: CPT

## 2025-01-15 RX ORDER — QUETIAPINE FUMARATE 25 MG/1
12.5 TABLET, FILM COATED ORAL DAILY
Status: DISCONTINUED | OUTPATIENT
Start: 2025-01-16 | End: 2025-01-16

## 2025-01-15 RX ORDER — QUETIAPINE FUMARATE 25 MG/1
12.5 TABLET, FILM COATED ORAL ONCE
Status: COMPLETED | OUTPATIENT
Start: 2025-01-15 | End: 2025-01-15

## 2025-01-15 RX ORDER — POTASSIUM CHLORIDE 1500 MG/1
40 TABLET, EXTENDED RELEASE ORAL 2 TIMES DAILY
Status: COMPLETED | OUTPATIENT
Start: 2025-01-15 | End: 2025-01-15

## 2025-01-15 RX ORDER — QUETIAPINE FUMARATE 25 MG/1
25 TABLET, FILM COATED ORAL ONCE
Status: DISCONTINUED | OUTPATIENT
Start: 2025-01-15 | End: 2025-01-15

## 2025-01-15 RX ORDER — QUETIAPINE FUMARATE 25 MG/1
25 TABLET, FILM COATED ORAL NIGHTLY
Status: DISCONTINUED | OUTPATIENT
Start: 2025-01-15 | End: 2025-01-16

## 2025-01-15 RX ORDER — DEXTROSE MONOHYDRATE 25 G/50ML
INJECTION, SOLUTION INTRAVENOUS
Status: COMPLETED
Start: 2025-01-15 | End: 2025-01-15

## 2025-01-15 RX ORDER — QUETIAPINE FUMARATE 25 MG/1
12.5 TABLET, FILM COATED ORAL EVERY EVENING
Status: DISCONTINUED | OUTPATIENT
Start: 2025-01-15 | End: 2025-01-15

## 2025-01-15 RX ADMIN — POTASSIUM CHLORIDE 40 MEQ: 1500 TABLET, EXTENDED RELEASE ORAL at 20:59

## 2025-01-15 RX ADMIN — Medication 1 TABLET: at 10:06

## 2025-01-15 RX ADMIN — ASPIRIN 81 MG: 81 TABLET, CHEWABLE ORAL at 10:26

## 2025-01-15 RX ADMIN — OXYCODONE HYDROCHLORIDE AND ACETAMINOPHEN 500 MG: 500 TABLET ORAL at 10:26

## 2025-01-15 RX ADMIN — QUETIAPINE FUMARATE 25 MG: 25 TABLET ORAL at 20:58

## 2025-01-15 RX ADMIN — ZINC SULFATE 220 MG (50 MG) CAPSULE 50 MG: CAPSULE at 10:06

## 2025-01-15 RX ADMIN — SODIUM CHLORIDE, PRESERVATIVE FREE 10 ML: 5 INJECTION INTRAVENOUS at 10:27

## 2025-01-15 RX ADMIN — Medication 3 MG: at 20:59

## 2025-01-15 RX ADMIN — CALCIUM CARBONATE 500 MG: 500 TABLET, CHEWABLE ORAL at 21:00

## 2025-01-15 RX ADMIN — ACETAMINOPHEN 650 MG: 325 TABLET ORAL at 20:58

## 2025-01-15 RX ADMIN — MIDODRINE HYDROCHLORIDE 10 MG: 5 TABLET ORAL at 10:26

## 2025-01-15 RX ADMIN — MIDODRINE HYDROCHLORIDE 10 MG: 5 TABLET ORAL at 13:29

## 2025-01-15 RX ADMIN — SERTRALINE HYDROCHLORIDE 50 MG: 50 TABLET ORAL at 10:26

## 2025-01-15 RX ADMIN — PANTOPRAZOLE SODIUM 40 MG: 40 TABLET, DELAYED RELEASE ORAL at 06:31

## 2025-01-15 RX ADMIN — DIVALPROEX SODIUM 125 MG: 125 CAPSULE, COATED PELLETS ORAL at 20:59

## 2025-01-15 RX ADMIN — ENOXAPARIN SODIUM 40 MG: 100 INJECTION SUBCUTANEOUS at 10:08

## 2025-01-15 RX ADMIN — DEXTROSE MONOHYDRATE 50 ML: 25 INJECTION, SOLUTION INTRAVENOUS at 07:53

## 2025-01-15 RX ADMIN — QUETIAPINE FUMARATE 12.5 MG: 25 TABLET ORAL at 17:11

## 2025-01-15 RX ADMIN — DIVALPROEX SODIUM 125 MG: 125 CAPSULE, COATED PELLETS ORAL at 10:06

## 2025-01-15 RX ADMIN — CALCIUM CARBONATE 500 MG: 500 TABLET, CHEWABLE ORAL at 10:08

## 2025-01-15 RX ADMIN — MIDODRINE HYDROCHLORIDE 10 MG: 5 TABLET ORAL at 17:11

## 2025-01-15 RX ADMIN — POTASSIUM CHLORIDE 40 MEQ: 1500 TABLET, EXTENDED RELEASE ORAL at 10:08

## 2025-01-15 RX ADMIN — Medication 1 TABLET: at 20:59

## 2025-01-15 ASSESSMENT — PAIN SCALES - GENERAL: PAINLEVEL_OUTOF10: 0

## 2025-01-15 NOTE — SIGNIFICANT EVENT
Rapid Called at 0739    Responded to RRT at 0739 for Lethargy     Provider at bedside: YES  Interventions ordered: D50  Sepsis Suspected: No  Transfer to Higher Level of Care: NA  Blood Glucose: 67     Situation  Pt started to be lethargy with slightly slow response     Background  Low BP,     Assessment  Lethargy and slo response    Intervention/Recommendation  D50, frequent glucose check.     Reevaluation  Pt is more awake.     Visit Vitals  BP (!) 94/58   Pulse 62   Temp 98.1 °F (36.7 °C) (Oral)   Resp 10   Ht 1.854 m (6' 1\")   Wt 94.8 kg (208 lb 14.4 oz)   SpO2 99%   BMI 27.56 kg/m²        Rapid Ended at 0750  RRT RN assisted with transport to accepting unit JOVAN Szymanski RN

## 2025-01-15 NOTE — PROGRESS NOTES
End of Shift Note    Bedside shift change report given to Susanne VIZCARRA (oncoming nurse) by Kiran Vences RN (offgoing nurse).  Report included the following information SBAR, Kardex, Intake/Output, MAR, Recent Results, and Cardiac Rhythm NSR    Shift worked:  2073-2907     Shift summary and any significant changes:     downgraded     Concerns for physician to address:       Zone phone for oncoming shift:          Activity:  Level of Assistance: Moderate assist, patient does 50-74%  Number times ambulated in hallways past shift: 0  Number of times OOB to chair past shift: 0    Cardiac:   Cardiac Monitoring: Yes      Cardiac Rhythm: Sinus rhythm    Access:  Current line(s): No IV    Genitourinary:   Urinary Status: Voiding, External catheter    Respiratory:   O2 Device: None (Room air)  Chronic home O2 use?: NO  Incentive spirometer at bedside: YES    GI:  Last BM (including prior to admit): 01/14/25  Current diet:  ADULT DIET; Regular  Passing flatus: YES    Pain Management:   Patient states pain is manageable on current regimen: YES    Skin:  Erick Scale Score: 17  Interventions: Wound Offloading (Prevention Methods): Pillows, Repositioning, Turning (turns self)    Patient Safety:  Fall Risk: Nursing Judgement-Fall Risk High(Add Comments): No  Fall Risk Interventions  Nursing Judgement-Fall Risk High(Add Comments): No  Toilet Every 2 Hours-In Advance of Need: Yes  Hourly Visual Checks: Awake, In bed, Confused  Fall Visual Posted: Armband, Socks, Fall sign posted  Room Door Open: Deferred for droplet isolation  Alarm On: Bed  Patient Moved Closer to Nursing Station: No    Active Consults:   None    Length of Stay:  Expected LOS: 4  Actual LOS: 3    Kiran Vences, RN

## 2025-01-15 NOTE — PROGRESS NOTES
Hospitalist Progress Note        Demographics    Patient Name  Josue Cruz   Date of Birth 1945   Medical Record Number  [id number]      Age  79 y.o.   PCP No primary care provider on file.   Admit date:  1/12/2025  4:11 PM     Room Number  2158/01  @ [facility]           Admission Diagnoses:  Hypotension   Admission Summary:  \" Josue Cruz is a 79 y.o.  male with PMHx significant for  has no past medical history on file. who presents with the above chief complaint.      We were asked to admit for work up and further evaluation.      Patient presenting following fall and fever. Was at memory care unit yesterday when patient had unwitnessed fall. Per EMS, complained of abdominal pain en route however denies any pain currently. Also noted to have fever to 102.8 while in transport.      On arrival, patient denies any current pain. States he does not know why he is here. Denies any chest pain, denies any SOB, no URI symptoms. No dysuria.      Patient with hx of dementia from memory care unit and is poor historian. \"      Assessment and plan:      Fever, suspect secondary to COVID 19 virus, POA  - CT with no focal infiltrates. He is not requiring oxygen  - procalcitonin WNL  - High risk for decompensation in this elderly pt with hx asthma and dementia  - supportive care.      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 Torsemide was stopped during last hospitalization   Echo 12/2024 with EF 60%, grade 1 diastolic dysfunction, mild AR, moderate AS.   - BP as low as 70/40s --> IVF boluses and increased midodrine to 10mg TID. Now BP closer to 90-100s/50. MAPs have generally been >60 with some variation. He remains nontoxic appearing and clinically asymptomatic of hypotension at present.  - Continue midodrine TID on schedule   -  other diagnostic information is necessary to determine patient infection status.  Positive results do not rule out bacterial infection or co-infection with other pathogens.        Rapid Influenza A By PCR Not detected        Rapid Influenza B By PCR Not detected        Comment: NOTE: Influenza A and Influenza B negative results should be considered presumptive in samples that have a positive SARS-CoV-2 result. Consider re-testing with an alternate FDA-approved test if clinically indicated.     Testing was performed using juan pablo Ale SARS-CoV-2 and Influenza A/B nucleic acid assay.  This test is a multiplex Real-Time Reverse Transcriptase Polymerase Chain Reaction (RT-PCR) based in vitro diagnostic test intended for the qualitative detection of nucleic acids from SARS-CoV-2, Influenza A, and Influenza B in nasopharyngeal and nasal swab specimens for use under the FDA's Emergency Use Authorization (EUA) only.     Fact sheet for Patients: https://www.fda.gov/media/888609/download  Fact sheet for Healthcare Providers: https://www.fda.fov/media/729527/download         Blood Culture 1 [[id number]] Collected: 01/12/25 1630    Order Status: Completed Specimen: Blood Updated: 01/14/25 2156     Special Requests NO SPECIAL REQUESTS        Culture NO GROWTH 2 DAYS               Recent Labs     01/12/25  1648 01/13/25  0414 01/14/25  0358 01/15/25  0607   WBC 6.2 5.0 5.4 5.1   HGB 11.1* 10.1* 9.9* 10.1*   HCT 35.7* 33.1* 32.9* 32.7*    285 245 260     Recent Labs     01/13/25  0414 01/14/25  0358 01/15/25  0607    139 136   K 3.5 3.2* 3.2*    110* 105   CO2 26 25 26   BUN 14 15 13   MG 2.0 1.9 1.9   ALT 14 17 18      No results found for: \"TSH\"      Other medical conditions are listed in the active hospital problem list section; these and other pertinent data were taken into consideration when the treatment plan was developed and customized to this patient's unique overall circumstances and needs.  We have

## 2025-01-15 NOTE — PROGRESS NOTES
Nursing contacted Nocturnist/cross cover provider via non-urgent messaging system [employer] and notified patient having right upper rib/below nipple, states gave tylenol, has let out a lot of flatulence, no abd acute complaints. States due to dementia not able to verbalize pain too much, but after tylenol appearing more comfortable. No other concerns reported. No acute distress reported. No other information provided by nurse. VSS.     Ordered stat cxr- pending, lidocaine patch topical hs start now. Will defer further evaluation/management to the day shift primary attending care team. Patient denies any further complaints or concerns.     Nursing to notify Hospitalist for further/continued concerns. Will remain available overnight for further concerns if nursing/patient needs. Please note, there are RRT systems in this hospital in place that if nursing has acute or critical patient condition change or concern, this is to help facilitate and notify that patient needs immediate bedside evaluation by a provider.     Update  2322 cxr per rad read IMPRESSION:  Small left effusion mild pulmonary edema increased in the interval.    Nurse reported pt is resting comfortably in bed presently. No inc wob or inc o2 use, remains on RA, vss. Ordered lasix 20mg iv x1. No other concerns reported. Nurse wcm and notify overnight further concerns.    Non-billable note.

## 2025-01-16 ENCOUNTER — APPOINTMENT (OUTPATIENT)
Facility: HOSPITAL | Age: 80
End: 2025-01-16
Payer: OTHER GOVERNMENT

## 2025-01-16 VITALS
SYSTOLIC BLOOD PRESSURE: 101 MMHG | WEIGHT: 208.9 LBS | BODY MASS INDEX: 27.69 KG/M2 | DIASTOLIC BLOOD PRESSURE: 54 MMHG | TEMPERATURE: 98.9 F | HEART RATE: 76 BPM | RESPIRATION RATE: 24 BRPM | HEIGHT: 73 IN | OXYGEN SATURATION: 97 %

## 2025-01-16 LAB
ALBUMIN SERPL-MCNC: 2.4 G/DL (ref 3.5–5)
ALBUMIN/GLOB SERPL: 0.6 (ref 1.1–2.2)
ALP SERPL-CCNC: 78 U/L (ref 45–117)
ALT SERPL-CCNC: 15 U/L (ref 12–78)
ANION GAP SERPL CALC-SCNC: 1 MMOL/L (ref 2–12)
AST SERPL-CCNC: 20 U/L (ref 15–37)
BASOPHILS # BLD: 0.03 K/UL (ref 0–0.1)
BASOPHILS NFR BLD: 0.5 % (ref 0–1)
BILIRUB SERPL-MCNC: 0.6 MG/DL (ref 0.2–1)
BUN SERPL-MCNC: 13 MG/DL (ref 6–20)
BUN/CREAT SERPL: 17 (ref 12–20)
CALCIUM SERPL-MCNC: 8.7 MG/DL (ref 8.5–10.1)
CHLORIDE SERPL-SCNC: 105 MMOL/L (ref 97–108)
CO2 SERPL-SCNC: 30 MMOL/L (ref 21–32)
CREAT SERPL-MCNC: 0.76 MG/DL (ref 0.7–1.3)
DIFFERENTIAL METHOD BLD: ABNORMAL
EOSINOPHIL # BLD: 0.24 K/UL (ref 0–0.4)
EOSINOPHIL NFR BLD: 3.8 % (ref 0–7)
ERYTHROCYTE [DISTWIDTH] IN BLOOD BY AUTOMATED COUNT: 15.6 % (ref 11.5–14.5)
GLOBULIN SER CALC-MCNC: 3.7 G/DL (ref 2–4)
GLUCOSE SERPL-MCNC: 74 MG/DL (ref 65–100)
HCT VFR BLD AUTO: 33.3 % (ref 36.6–50.3)
HGB BLD-MCNC: 10.4 G/DL (ref 12.1–17)
IMM GRANULOCYTES # BLD AUTO: 0.04 K/UL (ref 0–0.04)
IMM GRANULOCYTES NFR BLD AUTO: 0.6 % (ref 0–0.5)
LYMPHOCYTES # BLD: 0.9 K/UL (ref 0.8–3.5)
LYMPHOCYTES NFR BLD: 14.3 % (ref 12–49)
MCH RBC QN AUTO: 29.9 PG (ref 26–34)
MCHC RBC AUTO-ENTMCNC: 31.2 G/DL (ref 30–36.5)
MCV RBC AUTO: 95.7 FL (ref 80–99)
MONOCYTES # BLD: 0.88 K/UL (ref 0–1)
MONOCYTES NFR BLD: 13.9 % (ref 5–13)
NEUTS SEG # BLD: 4.22 K/UL (ref 1.8–8)
NEUTS SEG NFR BLD: 66.9 % (ref 32–75)
NRBC # BLD: 0 K/UL (ref 0–0.01)
NRBC BLD-RTO: 0 PER 100 WBC
PLATELET # BLD AUTO: 259 K/UL (ref 150–400)
PMV BLD AUTO: 11 FL (ref 8.9–12.9)
POTASSIUM SERPL-SCNC: 3.8 MMOL/L (ref 3.5–5.1)
PROT SERPL-MCNC: 6.1 G/DL (ref 6.4–8.2)
RBC # BLD AUTO: 3.48 M/UL (ref 4.1–5.7)
SODIUM SERPL-SCNC: 136 MMOL/L (ref 136–145)
WBC # BLD AUTO: 6.3 K/UL (ref 4.1–11.1)

## 2025-01-16 PROCEDURE — 6370000000 HC RX 637 (ALT 250 FOR IP): Performed by: PHYSICIAN ASSISTANT

## 2025-01-16 PROCEDURE — 36415 COLL VENOUS BLD VENIPUNCTURE: CPT

## 2025-01-16 PROCEDURE — 6360000002 HC RX W HCPCS: Performed by: STUDENT IN AN ORGANIZED HEALTH CARE EDUCATION/TRAINING PROGRAM

## 2025-01-16 PROCEDURE — 6370000000 HC RX 637 (ALT 250 FOR IP): Performed by: STUDENT IN AN ORGANIZED HEALTH CARE EDUCATION/TRAINING PROGRAM

## 2025-01-16 PROCEDURE — 85025 COMPLETE CBC W/AUTO DIFF WBC: CPT

## 2025-01-16 PROCEDURE — 71045 X-RAY EXAM CHEST 1 VIEW: CPT

## 2025-01-16 PROCEDURE — 80053 COMPREHEN METABOLIC PANEL: CPT

## 2025-01-16 PROCEDURE — 2500000003 HC RX 250 WO HCPCS: Performed by: STUDENT IN AN ORGANIZED HEALTH CARE EDUCATION/TRAINING PROGRAM

## 2025-01-16 RX ORDER — OLANZAPINE 5 MG/1
2.5 TABLET ORAL 2 TIMES DAILY
Status: DISCONTINUED | OUTPATIENT
Start: 2025-01-16 | End: 2025-01-16 | Stop reason: HOSPADM

## 2025-01-16 RX ORDER — MIDODRINE HYDROCHLORIDE 5 MG/1
10 TABLET ORAL
Qty: 90 TABLET | Refills: 0 | Status: SHIPPED | OUTPATIENT
Start: 2025-01-16

## 2025-01-16 RX ORDER — OLANZAPINE 5 MG/1
2.5 TABLET ORAL 2 TIMES DAILY
Qty: 30 TABLET | Refills: 3 | Status: SHIPPED
Start: 2025-01-16

## 2025-01-16 RX ADMIN — DIVALPROEX SODIUM 125 MG: 125 CAPSULE, COATED PELLETS ORAL at 10:53

## 2025-01-16 RX ADMIN — ZINC SULFATE 220 MG (50 MG) CAPSULE 50 MG: CAPSULE at 10:52

## 2025-01-16 RX ADMIN — MIDODRINE HYDROCHLORIDE 10 MG: 5 TABLET ORAL at 13:13

## 2025-01-16 RX ADMIN — ASPIRIN 81 MG: 81 TABLET, CHEWABLE ORAL at 10:52

## 2025-01-16 RX ADMIN — CALCIUM CARBONATE 500 MG: 500 TABLET, CHEWABLE ORAL at 10:52

## 2025-01-16 RX ADMIN — SERTRALINE HYDROCHLORIDE 50 MG: 50 TABLET ORAL at 10:52

## 2025-01-16 RX ADMIN — ONDANSETRON 4 MG: 2 INJECTION INTRAMUSCULAR; INTRAVENOUS at 00:19

## 2025-01-16 RX ADMIN — PANTOPRAZOLE SODIUM 40 MG: 40 TABLET, DELAYED RELEASE ORAL at 06:28

## 2025-01-16 RX ADMIN — OXYCODONE HYDROCHLORIDE AND ACETAMINOPHEN 500 MG: 500 TABLET ORAL at 10:52

## 2025-01-16 RX ADMIN — QUETIAPINE FUMARATE 12.5 MG: 25 TABLET ORAL at 06:43

## 2025-01-16 RX ADMIN — ENOXAPARIN SODIUM 40 MG: 100 INJECTION SUBCUTANEOUS at 10:52

## 2025-01-16 RX ADMIN — MIDODRINE HYDROCHLORIDE 10 MG: 5 TABLET ORAL at 06:29

## 2025-01-16 RX ADMIN — MIDODRINE HYDROCHLORIDE 10 MG: 5 TABLET ORAL at 17:10

## 2025-01-16 RX ADMIN — SODIUM CHLORIDE, PRESERVATIVE FREE 10 ML: 5 INJECTION INTRAVENOUS at 10:53

## 2025-01-16 RX ADMIN — Medication 1 TABLET: at 10:52

## 2025-01-16 ASSESSMENT — PAIN SCALES - GENERAL: PAINLEVEL_OUTOF10: 0

## 2025-01-16 NOTE — PROGRESS NOTES
NP Lane Medina notified that patient has temp 101 and vomited x 2 right after getting evening medication.

## 2025-01-16 NOTE — DISCHARGE SUMMARY
Physician Discharge Summary     Pt Name  Josue Cruz   Admit date:  1/12/2025   Discharge date and time:   1/16/25   Room Number  2158/01    Medical Record Number  [id number] @ [facility]   Age  79 y.o.   Date of Birth 1945   PCP No primary care provider on file.   Admission Diagnoses:                        Hypotension   Present on Admission:  • Hypotension     No Known Allergies      Admission Diagnoses:  Hypotension   Admission Summary:  \" Josue Cruz is a 79 y.o.  male with PMHx significant for  has no past medical history on file. who presents with the above chief complaint.      We were asked to admit for work up and further evaluation.      Patient presenting following fall and fever. Was at memory care unit yesterday when patient had unwitnessed fall. Per EMS, complained of abdominal pain en route however denies any pain currently. Also noted to have fever to 102.8 while in transport.      On arrival, patient denies any current pain. States he does not know why he is here. Denies any chest pain, denies any SOB, no URI symptoms. No dysuria.      Patient with hx of dementia from memory care unit and is poor historian. \"      Assessment and plan:      Fever, suspect secondary to COVID 19 virus, POA  - CT with no focal infiltrates. He is not requiring oxygen  - Repeat CXR shows LLL haziness, however it appears improved 1/16 compared to prior  - procalcitonin WNL  - High risk for decompensation in this elderly pt with hx asthma and dementia  - supportive care.      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 Torsemide was stopped during last hospitalization   Echo 12/2024 with EF 60%, grade 1 diastolic dysfunction, mild AR, moderate AS.   - BP as low as 70/40s --> IVF boluses and increased midodrine to 10mg TID. Now BP    Consultations  None   Procedures/Surgeries  * No surgery found *     Condition at the time of discharge  Improved and stable       Query  None noted today        Disposition SNF/LTC   Diet regular diet   Care Plan discussed with Patient/Family, Nurse, , and Other attending   Follow up Follow-up Information       Follow up With Specialties Details Why Contact Info    [facility]   [address] [place] [id number]  [phone]             Other Pertinent data:   TODAY'S CLINICAL FINDINGS:     Vitals:    01/16/25 0750 01/16/25 1055 01/16/25 1130 01/16/25 1440   BP: (!) 81/43 (!) 99/54 (!) 99/54 (!) 101/54   Pulse: 62 71 71 76   Resp: 18 18 18 24   Temp:  98.9 °F (37.2 °C) 98.9 °F (37.2 °C) 98.9 °F (37.2 °C)   TempSrc:   Oral    SpO2:  98% 98% 97%   Weight:       Height:           Physical Exam:             General:  Somnolent. Calm today.  well developed,   appears stated age    Ears/Eyes:     Mouth/Throat:  Mucous membranes normal pink and moist  Oral pharynx clear        Lungs:  No respiratory distress. Currently on RA. Symmetric chest rise.  Lungs CTAB   CVS:  Regular rate and rhythm  +systolic murmur. No click, rub or gallop    Palpable pedal pulses  bilaterally   Abdomen:  Soft, non-tender. No rebound or guarding.  Bowel sounds normal     Extremities:    No edema noted   No calf tenderness   Skin:  No rash.  No cyanosis, jaundice   Lymph nodes:  Not examined    Musculoskeletal Muscle bulk nml   Neuro No focal deficits. Full exam deferred- pt somnolent     Psych:  somnolent                Medication List        START taking these medications      melatonin 3 MG Tabs tablet  Take 1 tablet by mouth at bedtime            CHANGE how you take these medications      midodrine 5 MG tablet  Commonly known as: PROAMATINE  Take 2 tablets by mouth 3 times daily (with meals)  What changed: how much to take     OLANZapine 5 MG tablet  Commonly known as:

## 2025-01-16 NOTE — PROGRESS NOTES
Nursing contacted Nocturnist/cross cover provider via non-urgent messaging system [employer] and notified patient T 101, states pt vomited twice after getting night time meds, awaiting confirmation from nurse if the pills were completely intact that were given or not, consider to give iv meds for po substitute if pills visualized by nurse to be intact in vomitus. Pt here with viral illness covid- remaining vss, not septic shock appearing, hr wnl, no inc wob, no inc o2 use. No other concerns reported. No acute distress reported. No other information provided by nurse. VSS.     Ordered ok nurse monitor, would consider ordering meds as above if nurse reported whole pills were vomited, nurse may give the prn zofran and tylenol as ordered already. Will defer further evaluation/management to the day shift primary attending care team. Patient denies any further complaints or concerns.     Nursing to notify Hospitalist for further/continued concerns. Will remain available overnight for further concerns if nursing/patient needs. Please note, there are RRT systems in this hospital in place that if nursing has acute or critical patient condition change or concern, this is to help facilitate and notify that patient needs immediate bedside evaluation by a provider.     Non-billable note.

## 2025-01-16 NOTE — PLAN OF CARE
Problem: Discharge Planning  Goal: Discharge to home or other facility with appropriate resources  1/16/2025 1324 by Tianna Ledesma RN  Outcome: Not Progressing  1/16/2025 0644 by Susanne Pool RN  Outcome: Progressing     Problem: Safety - Adult  Goal: Free from fall injury  1/16/2025 1324 by Tianna Ledesma RN  Outcome: Progressing  1/16/2025 0644 by Susanne Pool RN  Outcome: Progressing     Problem: Pain  Goal: Verbalizes/displays adequate comfort level or baseline comfort level  Outcome: Progressing     Problem: Skin/Tissue Integrity  Goal: Absence of new skin breakdown  Description: 1.  Monitor for areas of redness and/or skin breakdown  2.  Assess vascular access sites hourly  3.  Every 4-6 hours minimum:  Change oxygen saturation probe site  4.  Every 4-6 hours:  If on nasal continuous positive airway pressure, respiratory therapy assess nares and determine need for appliance change or resting period.  1/16/2025 1324 by Tianna Ledesma RN  Outcome: Progressing  1/16/2025 0644 by Susanne Pool RN  Outcome: Progressing     Problem: Discharge Planning  Goal: Discharge to home or other facility with appropriate resources  1/16/2025 1324 by Tianna Ledesma RN  Outcome: Not Progressing  1/16/2025 0644 by Susanne Pool RN  Outcome: Progressing

## 2025-01-16 NOTE — CARE COORDINATION
Transition of Care Plan:    RUR: 16%  Prior Level of Functioning: requires assistance   [phone]  BRIANA: 1/17  Transportation at discharge: [facility] @ 1700  IM/IMM Medicare/ letter given: to be provided  Caregiver Contact: spouse Mariann Cruz [phone]  Discharge Caregiver contacted prior to discharge? yes  Care Conference needed? Not at this time   Barriers to discharge: medical stability    Update  CM acknowledged dc order. RASHARD spoke with Madhuri at [facility] who confirmed they can accept patient back today. [facility] approved by [facility] and arranged for 5pm.       Initial note  CM spoke with patient's wife, plan remains for patient to return to [facility] at d/c pending medical stability.       ALLYSSA Cox, CM  [phone]

## 2025-01-16 NOTE — PROGRESS NOTES
Called [facility] [place], gave report to accepting nurse.       Discharge Summary     Patient: Josue Cruz MRN: [id number]  SSN: [id number]    YOB: 1945  Age: 79 y.o.  Sex: male       Code Status: Full  Allergies: No Known Allergies    Admit Date: 1/12/2025    Discharge Date: 1/16/2025      Admission Diagnoses: Hypotension [I95.9]  Chronic asymptomatic hypotension [I95.89]  COVID-19 [U07.1]    PMH: History reviewed. No pertinent past medical history.    Social History:  Social history   Social History     Tobacco Use    Smoking status: Never    Smokeless tobacco: Never   Substance Use Topics    Alcohol use: Not on file     Drug History   Social History     Substance and Sexual Activity   Drug Use Not on file     Familiy History History reviewed. No pertinent family history.    Consults: None    Significant Diagnostic Studies:     Discharge Condition: Good    Disposition: long term care facility    Discharge Medications:   Current Discharge Medication List        START taking these medications    Details   melatonin 3 MG TABS tablet Take 1 tablet by mouth at bedtime  Qty: 30 tablet, Refills: 3           CONTINUE these medications which have CHANGED    Details   OLANZapine (ZYPREXA) 5 MG tablet Take 0.5 tablets by mouth in the morning and at bedtime  Qty: 30 tablet, Refills: 3      midodrine (PROAMATINE) 5 MG tablet Take 2 tablets by mouth 3 times daily (with meals)  Qty: 90 tablet, Refills: 0           CONTINUE these medications which have NOT CHANGED    Details   divalproex (DEPAKOTE SPRINKLE) 125 MG DR capsule Take 1 capsule by mouth 2 times daily      Calcium Carbonate-Vitamin D (OYSTER SHELL CALCIUM/D) 500-5 MG-MCG TABS Take 1 tablet by mouth 2 times daily      aspirin 81 MG chewable tablet Take 1 tablet by mouth daily      ascorbic acid (VITAMIN C) 500 MG tablet Take 1 tablet by mouth daily      zinc gluconate 50 MG tablet Take 1 tablet by mouth daily      omeprazole  (PRILOSEC) 10 MG delayed release capsule Take 2 capsules by mouth daily      sertraline (ZOLOFT) 100 MG tablet Take 0.5 tablets by mouth daily For PTSD      Multiple Vitamin (MULTIVITAMIN) TABS tablet Take 1 tablet by mouth daily      donepezil (ARICEPT) 10 MG tablet Take 1 tablet by mouth daily      calcium carbonate (TUMS) 500 MG chewable tablet Take 1 tablet by mouth 2 times daily      pyrethrins-piperonyl butoxide 0.5 % bottle Apply topically nightly Apply to complete body topically at bed time           STOP taking these medications       hydrOXYzine HCl (ATARAX) 25 MG tablet Comments:   Reason for Stopping:         risperiDONE (RISPERDAL) 0.5 MG tablet Comments:   Reason for Stopping:               Hard scripts included in packet: no    Isolation/Precautions: Yes  Isolation Type: Droplet    Activity: activity as tolerated  Diet: ADULT DIET; Regular  Wound Care: none needed   Lines: no access  [place]: N/A      Last Vitals: VITALS:  BP (!) 101/54   Pulse 76   Temp 98.9 °F (37.2 °C)   Resp 24   Ht 1.854 m (6' 1\")   Wt 94.8 kg (208 lb 14.4 oz)   SpO2 97%   BMI 27.56 kg/m²  and TEMPERATURE:  Current - Temp: 98.9 °F (37.2 °C); Max - Temp  Av.3 °F (37.4 °C)  Min: 98.9 °F (37.2 °C)  Max: 101 °F (38.3 °C)    Oxygen Device: room air only    No follow-ups on file.    Report given to Olman DYSON LPN at  4:36 PM.   Patient to discharge to [facility] at  1700 pm via [facility] stretcher and [facility] ambulance.   Please call [phone] any additional questions or concerns.       Signed By: Tianna Ledesma RN     2025

## 2025-01-17 LAB
BACTERIA SPEC CULT: NORMAL
BACTERIA SPEC CULT: NORMAL
SERVICE CMNT-IMP: NORMAL
SERVICE CMNT-IMP: NORMAL

## 2025-01-19 LAB
EKG ATRIAL RATE: 75 BPM
EKG DIAGNOSIS: NORMAL
EKG P AXIS: -2 DEGREES
EKG P-R INTERVAL: 200 MS
EKG Q-T INTERVAL: 404 MS
EKG QRS DURATION: 92 MS
EKG QTC CALCULATION (BAZETT): 451 MS
EKG R AXIS: -48 DEGREES
EKG T AXIS: 65 DEGREES
EKG VENTRICULAR RATE: 75 BPM

## 2025-02-14 ENCOUNTER — APPOINTMENT (OUTPATIENT)
Facility: HOSPITAL | Age: 80
End: 2025-02-14
Payer: OTHER GOVERNMENT

## 2025-02-14 ENCOUNTER — HOSPITAL ENCOUNTER (INPATIENT)
Facility: HOSPITAL | Age: 80
LOS: 8 days | Discharge: NURSING, REHAB OR LONG-TERM CARE | End: 2025-02-22
Attending: EMERGENCY MEDICINE | Admitting: INTERNAL MEDICINE
Payer: OTHER GOVERNMENT

## 2025-02-14 DIAGNOSIS — J10.1 INFLUENZA A: Primary | ICD-10-CM

## 2025-02-14 DIAGNOSIS — A41.9 SEPSIS, DUE TO UNSPECIFIED ORGANISM, UNSPECIFIED WHETHER ACUTE ORGAN DYSFUNCTION PRESENT (HCC): ICD-10-CM

## 2025-02-14 PROBLEM — I35.0 MODERATE AORTIC STENOSIS BY PRIOR ECHOCARDIOGRAM: Status: ACTIVE | Noted: 2025-02-14

## 2025-02-14 PROBLEM — F03.90 DEMENTIA (HCC): Status: ACTIVE | Noted: 2025-02-14

## 2025-02-14 LAB
ALBUMIN SERPL-MCNC: 1.9 G/DL (ref 3.5–5)
ALBUMIN/GLOB SERPL: 0.6 (ref 1.1–2.2)
ALP SERPL-CCNC: 63 U/L (ref 45–117)
ALT SERPL-CCNC: 20 U/L (ref 12–78)
ANION GAP BLD CALC-SCNC: 12 (ref 10–20)
ANION GAP SERPL CALC-SCNC: 8 MMOL/L (ref 2–12)
APPEARANCE UR: ABNORMAL
AST SERPL-CCNC: 36 U/L (ref 15–37)
BACTERIA URNS QL MICRO: ABNORMAL /HPF
BASE EXCESS BLD CALC-SCNC: 4 MMOL/L
BASOPHILS # BLD: 0.03 K/UL (ref 0–0.1)
BASOPHILS NFR BLD: 0.3 % (ref 0–1)
BILIRUB SERPL-MCNC: 0.6 MG/DL (ref 0.2–1)
BILIRUB UR QL: NEGATIVE
BUN SERPL-MCNC: 12 MG/DL (ref 6–20)
BUN/CREAT SERPL: 15 (ref 12–20)
CA-I BLD-MCNC: 1.16 MMOL/L (ref 1.15–1.33)
CALCIUM SERPL-MCNC: 7.7 MG/DL (ref 8.5–10.1)
CHLORIDE BLD-SCNC: 104 MMOL/L (ref 100–111)
CHLORIDE SERPL-SCNC: 108 MMOL/L (ref 97–108)
CO2 BLD-SCNC: 27 MMOL/L (ref 22–29)
CO2 SERPL-SCNC: 26 MMOL/L (ref 21–32)
COLOR UR: YELLOW
CREAT SERPL-MCNC: 0.78 MG/DL (ref 0.7–1.3)
CREAT UR-MCNC: 0.7 MG/DL (ref 0.6–1.3)
DIFFERENTIAL METHOD BLD: ABNORMAL
EOSINOPHIL # BLD: 0 K/UL (ref 0–0.4)
EOSINOPHIL NFR BLD: 0 % (ref 0–7)
EPITH CASTS URNS QL MICRO: ABNORMAL /LPF
ERYTHROCYTE [DISTWIDTH] IN BLOOD BY AUTOMATED COUNT: 16.2 % (ref 11.5–14.5)
FLUAV RNA SPEC QL NAA+PROBE: DETECTED
FLUBV RNA SPEC QL NAA+PROBE: NOT DETECTED
FOLATE SERPL-MCNC: 9.8 NG/ML (ref 5–21)
GLOBULIN SER CALC-MCNC: 3.4 G/DL (ref 2–4)
GLUCOSE BLD STRIP.AUTO-MCNC: 100 MG/DL (ref 65–117)
GLUCOSE BLD STRIP.AUTO-MCNC: 98 MG/DL (ref 74–99)
GLUCOSE SERPL-MCNC: 87 MG/DL (ref 65–100)
GLUCOSE UR STRIP.AUTO-MCNC: NEGATIVE MG/DL
HCO3 BLDA-SCNC: 28 MMOL/L
HCT VFR BLD AUTO: 26.1 % (ref 36.6–50.3)
HGB BLD-MCNC: 8.2 G/DL (ref 12.1–17)
HGB UR QL STRIP: ABNORMAL
IMM GRANULOCYTES # BLD AUTO: 0.06 K/UL (ref 0–0.04)
IMM GRANULOCYTES NFR BLD AUTO: 0.6 % (ref 0–0.5)
IRON SATN MFR SERPL: 4 % (ref 20–50)
IRON SERPL-MCNC: 5 UG/DL (ref 35–150)
KETONES UR QL STRIP.AUTO: NEGATIVE MG/DL
LACTATE BLD-SCNC: 0.7 MMOL/L (ref 0.4–2)
LACTATE BLD-SCNC: 1.42 MMOL/L (ref 0.4–2)
LEUKOCYTE ESTERASE UR QL STRIP.AUTO: ABNORMAL
LYMPHOCYTES # BLD: 0.51 K/UL (ref 0.8–3.5)
LYMPHOCYTES NFR BLD: 5.4 % (ref 12–49)
MAGNESIUM SERPL-MCNC: 1.8 MG/DL (ref 1.6–2.4)
MCH RBC QN AUTO: 29.7 PG (ref 26–34)
MCHC RBC AUTO-ENTMCNC: 31.4 G/DL (ref 30–36.5)
MCV RBC AUTO: 94.6 FL (ref 80–99)
MONOCYTES # BLD: 0.9 K/UL (ref 0–1)
MONOCYTES NFR BLD: 9.5 % (ref 5–13)
MUCOUS THREADS URNS QL MICRO: ABNORMAL /LPF
NEUTS SEG # BLD: 8 K/UL (ref 1.8–8)
NEUTS SEG NFR BLD: 84.2 % (ref 32–75)
NITRITE UR QL STRIP.AUTO: NEGATIVE
NRBC # BLD: 0 K/UL (ref 0–0.01)
NRBC BLD-RTO: 0 PER 100 WBC
PCO2 BLDV: 38.6 MMHG (ref 41–51)
PH BLDV: 7.47 (ref 7.32–7.42)
PH UR STRIP: 7 (ref 5–8)
PLATELET # BLD AUTO: 235 K/UL (ref 150–400)
PMV BLD AUTO: 10.4 FL (ref 8.9–12.9)
PO2 BLDV: 44 MMHG (ref 25–40)
POTASSIUM BLD-SCNC: 2.9 MMOL/L (ref 3.5–5.5)
POTASSIUM SERPL-SCNC: 3.2 MMOL/L (ref 3.5–5.1)
PROCALCITONIN SERPL-MCNC: 0.13 NG/ML
PROCALCITONIN SERPL-MCNC: 0.15 NG/ML
PROT SERPL-MCNC: 5.3 G/DL (ref 6.4–8.2)
PROT UR STRIP-MCNC: 30 MG/DL
RBC # BLD AUTO: 2.76 M/UL (ref 4.1–5.7)
RBC #/AREA URNS HPF: >100 /HPF (ref 0–5)
RBC MORPH BLD: ABNORMAL
RETICS # AUTO: 0.03 M/UL (ref 0.03–0.1)
RETICS/RBC NFR AUTO: 0.9 % (ref 0.7–2.1)
SAO2 % BLD: 82 % (ref 94–98)
SARS-COV-2 RNA RESP QL NAA+PROBE: NOT DETECTED
SERVICE CMNT-IMP: NORMAL
SODIUM BLD-SCNC: 143 MMOL/L (ref 136–145)
SODIUM SERPL-SCNC: 142 MMOL/L (ref 136–145)
SOURCE: ABNORMAL
SP GR UR REFRACTOMETRY: 1.01
SPECIMEN SITE: ABNORMAL
TIBC SERPL-MCNC: 132 UG/DL (ref 250–450)
TROPONIN I SERPL HS-MCNC: 147 NG/L (ref 0–76)
URINE CULTURE IF INDICATED: ABNORMAL
UROBILINOGEN UR QL STRIP.AUTO: 1 EU/DL (ref 0.2–1)
VIT B12 SERPL-MCNC: 422 PG/ML (ref 193–986)
WBC # BLD AUTO: 9.5 K/UL (ref 4.1–11.1)
WBC URNS QL MICRO: ABNORMAL /HPF (ref 0–4)

## 2025-02-14 PROCEDURE — 93005 ELECTROCARDIOGRAM TRACING: CPT | Performed by: EMERGENCY MEDICINE

## 2025-02-14 PROCEDURE — 83735 ASSAY OF MAGNESIUM: CPT

## 2025-02-14 PROCEDURE — 82330 ASSAY OF CALCIUM: CPT

## 2025-02-14 PROCEDURE — 2500000003 HC RX 250 WO HCPCS: Performed by: EMERGENCY MEDICINE

## 2025-02-14 PROCEDURE — 6360000002 HC RX W HCPCS: Performed by: INTERNAL MEDICINE

## 2025-02-14 PROCEDURE — 83605 ASSAY OF LACTIC ACID: CPT

## 2025-02-14 PROCEDURE — 82607 VITAMIN B-12: CPT

## 2025-02-14 PROCEDURE — 80053 COMPREHEN METABOLIC PANEL: CPT

## 2025-02-14 PROCEDURE — 82746 ASSAY OF FOLIC ACID SERUM: CPT

## 2025-02-14 PROCEDURE — 82947 ASSAY GLUCOSE BLOOD QUANT: CPT

## 2025-02-14 PROCEDURE — 6370000000 HC RX 637 (ALT 250 FOR IP): Performed by: INTERNAL MEDICINE

## 2025-02-14 PROCEDURE — 2580000003 HC RX 258: Performed by: INTERNAL MEDICINE

## 2025-02-14 PROCEDURE — 6370000000 HC RX 637 (ALT 250 FOR IP): Performed by: EMERGENCY MEDICINE

## 2025-02-14 PROCEDURE — 87040 BLOOD CULTURE FOR BACTERIA: CPT

## 2025-02-14 PROCEDURE — 83550 IRON BINDING TEST: CPT

## 2025-02-14 PROCEDURE — 82803 BLOOD GASES ANY COMBINATION: CPT

## 2025-02-14 PROCEDURE — 84295 ASSAY OF SERUM SODIUM: CPT

## 2025-02-14 PROCEDURE — 2580000003 HC RX 258: Performed by: EMERGENCY MEDICINE

## 2025-02-14 PROCEDURE — 2060000000 HC ICU INTERMEDIATE R&B

## 2025-02-14 PROCEDURE — 82533 TOTAL CORTISOL: CPT

## 2025-02-14 PROCEDURE — 2500000003 HC RX 250 WO HCPCS: Performed by: INTERNAL MEDICINE

## 2025-02-14 PROCEDURE — 85045 AUTOMATED RETICULOCYTE COUNT: CPT

## 2025-02-14 PROCEDURE — 99285 EMERGENCY DEPT VISIT HI MDM: CPT

## 2025-02-14 PROCEDURE — 83540 ASSAY OF IRON: CPT

## 2025-02-14 PROCEDURE — 96374 THER/PROPH/DIAG INJ IV PUSH: CPT

## 2025-02-14 PROCEDURE — 84145 PROCALCITONIN (PCT): CPT

## 2025-02-14 PROCEDURE — 71045 X-RAY EXAM CHEST 1 VIEW: CPT

## 2025-02-14 PROCEDURE — 82962 GLUCOSE BLOOD TEST: CPT

## 2025-02-14 PROCEDURE — 87636 SARSCOV2 & INF A&B AMP PRB: CPT

## 2025-02-14 PROCEDURE — 81001 URINALYSIS AUTO W/SCOPE: CPT

## 2025-02-14 PROCEDURE — 84132 ASSAY OF SERUM POTASSIUM: CPT

## 2025-02-14 PROCEDURE — 87086 URINE CULTURE/COLONY COUNT: CPT

## 2025-02-14 PROCEDURE — 84484 ASSAY OF TROPONIN QUANT: CPT

## 2025-02-14 PROCEDURE — 36415 COLL VENOUS BLD VENIPUNCTURE: CPT

## 2025-02-14 PROCEDURE — 6360000002 HC RX W HCPCS: Performed by: EMERGENCY MEDICINE

## 2025-02-14 PROCEDURE — 85025 COMPLETE CBC W/AUTO DIFF WBC: CPT

## 2025-02-14 RX ORDER — VANCOMYCIN 2 G/400ML
2000 INJECTION, SOLUTION INTRAVENOUS ONCE
Status: COMPLETED | OUTPATIENT
Start: 2025-02-14 | End: 2025-02-14

## 2025-02-14 RX ORDER — SODIUM CHLORIDE 0.9 % (FLUSH) 0.9 %
5-40 SYRINGE (ML) INJECTION EVERY 12 HOURS SCHEDULED
Status: DISCONTINUED | OUTPATIENT
Start: 2025-02-14 | End: 2025-02-22 | Stop reason: HOSPADM

## 2025-02-14 RX ORDER — ZINC SULFATE 50(220)MG
50 CAPSULE ORAL DAILY
Status: DISCONTINUED | OUTPATIENT
Start: 2025-02-14 | End: 2025-02-22 | Stop reason: HOSPADM

## 2025-02-14 RX ORDER — ACETAMINOPHEN 325 MG/1
650 TABLET ORAL EVERY 6 HOURS PRN
Status: DISCONTINUED | OUTPATIENT
Start: 2025-02-14 | End: 2025-02-15 | Stop reason: SDUPTHER

## 2025-02-14 RX ORDER — OLANZAPINE 2.5 MG/1
2.5 TABLET, FILM COATED ORAL 2 TIMES DAILY
Status: DISCONTINUED | OUTPATIENT
Start: 2025-02-14 | End: 2025-02-18

## 2025-02-14 RX ORDER — CALCIUM CARBONATE 500 MG/1
1 TABLET, CHEWABLE ORAL 2 TIMES DAILY
Status: DISCONTINUED | OUTPATIENT
Start: 2025-02-14 | End: 2025-02-22 | Stop reason: HOSPADM

## 2025-02-14 RX ORDER — ACETAMINOPHEN 325 MG/1
650 TABLET ORAL EVERY 6 HOURS PRN
Status: DISCONTINUED | OUTPATIENT
Start: 2025-02-14 | End: 2025-02-22 | Stop reason: HOSPADM

## 2025-02-14 RX ORDER — ENOXAPARIN SODIUM 100 MG/ML
40 INJECTION SUBCUTANEOUS DAILY
Status: DISCONTINUED | OUTPATIENT
Start: 2025-02-14 | End: 2025-02-22 | Stop reason: HOSPADM

## 2025-02-14 RX ORDER — GUAIFENESIN 200 MG/10ML
300 LIQUID ORAL EVERY 8 HOURS PRN
Status: DISCONTINUED | OUTPATIENT
Start: 2025-02-14 | End: 2025-02-22 | Stop reason: HOSPADM

## 2025-02-14 RX ORDER — 0.9 % SODIUM CHLORIDE 0.9 %
30 INTRAVENOUS SOLUTION INTRAVENOUS ONCE
Status: COMPLETED | OUTPATIENT
Start: 2025-02-14 | End: 2025-02-14

## 2025-02-14 RX ORDER — ASPIRIN 81 MG/1
81 TABLET ORAL DAILY
Status: DISCONTINUED | OUTPATIENT
Start: 2025-02-14 | End: 2025-02-22 | Stop reason: HOSPADM

## 2025-02-14 RX ORDER — ASCORBIC ACID 500 MG
500 TABLET ORAL DAILY
Status: DISCONTINUED | OUTPATIENT
Start: 2025-02-14 | End: 2025-02-22 | Stop reason: HOSPADM

## 2025-02-14 RX ORDER — SODIUM CHLORIDE 9 MG/ML
INJECTION, SOLUTION INTRAVENOUS CONTINUOUS
Status: DISCONTINUED | OUTPATIENT
Start: 2025-02-14 | End: 2025-02-15

## 2025-02-14 RX ORDER — MULTIVITAMIN WITH IRON
1 TABLET ORAL DAILY
Status: DISCONTINUED | OUTPATIENT
Start: 2025-02-14 | End: 2025-02-22 | Stop reason: HOSPADM

## 2025-02-14 RX ORDER — SODIUM CHLORIDE 0.9 % (FLUSH) 0.9 %
5-40 SYRINGE (ML) INJECTION PRN
Status: DISCONTINUED | OUTPATIENT
Start: 2025-02-14 | End: 2025-02-22 | Stop reason: HOSPADM

## 2025-02-14 RX ORDER — HYDROCORTISONE SODIUM SUCCINATE 100 MG/2ML
100 INJECTION INTRAMUSCULAR; INTRAVENOUS EVERY 8 HOURS
Status: DISCONTINUED | OUTPATIENT
Start: 2025-02-14 | End: 2025-02-16

## 2025-02-14 RX ORDER — PANTOPRAZOLE SODIUM 40 MG/1
40 TABLET, DELAYED RELEASE ORAL
Status: DISCONTINUED | OUTPATIENT
Start: 2025-02-15 | End: 2025-02-22 | Stop reason: HOSPADM

## 2025-02-14 RX ORDER — ACETAMINOPHEN 325 MG/1
650 TABLET ORAL EVERY 4 HOURS PRN
Status: DISCONTINUED | OUTPATIENT
Start: 2025-02-14 | End: 2025-02-14

## 2025-02-14 RX ORDER — MIDODRINE HYDROCHLORIDE 5 MG/1
10 TABLET ORAL
Status: COMPLETED | OUTPATIENT
Start: 2025-02-14 | End: 2025-02-14

## 2025-02-14 RX ORDER — ACETAMINOPHEN 650 MG/1
650 SUPPOSITORY RECTAL EVERY 6 HOURS PRN
Status: DISCONTINUED | OUTPATIENT
Start: 2025-02-14 | End: 2025-02-22 | Stop reason: HOSPADM

## 2025-02-14 RX ORDER — MIDODRINE HYDROCHLORIDE 5 MG/1
10 TABLET ORAL
Status: DISCONTINUED | OUTPATIENT
Start: 2025-02-14 | End: 2025-02-22 | Stop reason: HOSPADM

## 2025-02-14 RX ORDER — DIVALPROEX SODIUM 125 MG/1
125 CAPSULE, COATED PELLETS ORAL 2 TIMES DAILY
Status: DISCONTINUED | OUTPATIENT
Start: 2025-02-14 | End: 2025-02-22 | Stop reason: HOSPADM

## 2025-02-14 RX ORDER — OSELTAMIVIR PHOSPHATE 75 MG/1
75 CAPSULE ORAL 2 TIMES DAILY
Status: DISPENSED | OUTPATIENT
Start: 2025-02-14 | End: 2025-02-19

## 2025-02-14 RX ORDER — 0.9 % SODIUM CHLORIDE 0.9 %
30 INTRAVENOUS SOLUTION INTRAVENOUS ONCE
Status: DISCONTINUED | OUTPATIENT
Start: 2025-02-14 | End: 2025-02-22 | Stop reason: HOSPADM

## 2025-02-14 RX ORDER — B-COMPLEX WITH VITAMIN C
1 TABLET ORAL 2 TIMES DAILY
Status: DISCONTINUED | OUTPATIENT
Start: 2025-02-14 | End: 2025-02-22 | Stop reason: HOSPADM

## 2025-02-14 RX ORDER — ASPIRIN 81 MG/1
81 TABLET ORAL DAILY
COMMUNITY

## 2025-02-14 RX ORDER — DONEPEZIL HYDROCHLORIDE 5 MG/1
10 TABLET, FILM COATED ORAL DAILY
Status: DISCONTINUED | OUTPATIENT
Start: 2025-02-15 | End: 2025-02-22 | Stop reason: HOSPADM

## 2025-02-14 RX ORDER — GUAIFENESIN 200 MG/10ML
300 LIQUID ORAL EVERY 8 HOURS PRN
COMMUNITY

## 2025-02-14 RX ORDER — SODIUM CHLORIDE 9 MG/ML
INJECTION, SOLUTION INTRAVENOUS PRN
Status: DISCONTINUED | OUTPATIENT
Start: 2025-02-14 | End: 2025-02-22 | Stop reason: HOSPADM

## 2025-02-14 RX ORDER — ACETAMINOPHEN 325 MG/1
650 TABLET ORAL
Status: COMPLETED | OUTPATIENT
Start: 2025-02-14 | End: 2025-02-14

## 2025-02-14 RX ORDER — ACETAMINOPHEN 325 MG/1
650 TABLET ORAL EVERY 6 HOURS PRN
COMMUNITY

## 2025-02-14 RX ORDER — ONDANSETRON 2 MG/ML
4 INJECTION INTRAMUSCULAR; INTRAVENOUS EVERY 4 HOURS PRN
Status: DISCONTINUED | OUTPATIENT
Start: 2025-02-14 | End: 2025-02-22 | Stop reason: HOSPADM

## 2025-02-14 RX ORDER — OSELTAMIVIR PHOSPHATE 75 MG/1
75 CAPSULE ORAL
Status: COMPLETED | OUTPATIENT
Start: 2025-02-14 | End: 2025-02-14

## 2025-02-14 RX ADMIN — MIDODRINE HYDROCHLORIDE 10 MG: 5 TABLET ORAL at 17:37

## 2025-02-14 RX ADMIN — SODIUM CHLORIDE 2670 ML: 0.9 INJECTION, SOLUTION INTRAVENOUS at 09:22

## 2025-02-14 RX ADMIN — SODIUM CHLORIDE: 9 INJECTION, SOLUTION INTRAVENOUS at 13:00

## 2025-02-14 RX ADMIN — VANCOMYCIN 2000 MG: 2 INJECTION, SOLUTION INTRAVENOUS at 13:02

## 2025-02-14 RX ADMIN — ACETAMINOPHEN 650 MG: 325 TABLET ORAL at 09:15

## 2025-02-14 RX ADMIN — PIPERACILLIN AND TAZOBACTAM 3375 MG: 3; .375 INJECTION, POWDER, LYOPHILIZED, FOR SOLUTION INTRAVENOUS at 18:52

## 2025-02-14 RX ADMIN — CALCIUM CARBONATE (ANTACID) CHEW TAB 500 MG 500 MG: 500 CHEW TAB at 21:05

## 2025-02-14 RX ADMIN — SERTRALINE 100 MG: 50 TABLET, FILM COATED ORAL at 17:37

## 2025-02-14 RX ADMIN — MIDODRINE HYDROCHLORIDE 10 MG: 5 TABLET ORAL at 09:15

## 2025-02-14 RX ADMIN — OSELTAMIVIR PHOSPHATE 75 MG: 75 CAPSULE ORAL at 21:05

## 2025-02-14 RX ADMIN — Medication 3 MG: at 21:05

## 2025-02-14 RX ADMIN — OSELTAMIVIR PHOSPHATE 75 MG: 75 CAPSULE ORAL at 10:31

## 2025-02-14 RX ADMIN — VANCOMYCIN HYDROCHLORIDE 1000 MG: 1 INJECTION, POWDER, LYOPHILIZED, FOR SOLUTION INTRAVENOUS at 21:05

## 2025-02-14 RX ADMIN — THERA TABS 1 TABLET: TAB at 18:53

## 2025-02-14 RX ADMIN — PIPERACILLIN AND TAZOBACTAM 4500 MG: 4; .5 INJECTION, POWDER, LYOPHILIZED, FOR SOLUTION INTRAVENOUS at 12:58

## 2025-02-14 RX ADMIN — HYDROCORTISONE SODIUM SUCCINATE 100 MG: 100 INJECTION, POWDER, FOR SOLUTION INTRAMUSCULAR; INTRAVENOUS at 18:52

## 2025-02-14 RX ADMIN — OLANZAPINE 2.5 MG: 2.5 TABLET, FILM COATED ORAL at 21:51

## 2025-02-14 RX ADMIN — ASPIRIN 81 MG: 81 TABLET, COATED ORAL at 17:37

## 2025-02-14 RX ADMIN — DIVALPROEX SODIUM 125 MG: 125 CAPSULE ORAL at 21:05

## 2025-02-14 RX ADMIN — ENOXAPARIN SODIUM 40 MG: 100 INJECTION SUBCUTANEOUS at 13:03

## 2025-02-14 RX ADMIN — WATER 1000 MG: 1 INJECTION INTRAMUSCULAR; INTRAVENOUS; SUBCUTANEOUS at 09:25

## 2025-02-14 RX ADMIN — SODIUM CHLORIDE, PRESERVATIVE FREE 10 ML: 5 INJECTION INTRAVENOUS at 21:05

## 2025-02-14 RX ADMIN — ONDANSETRON 4 MG: 2 INJECTION INTRAMUSCULAR; INTRAVENOUS at 22:02

## 2025-02-14 RX ADMIN — ZINC SULFATE 220 MG (50 MG) CAPSULE 50 MG: CAPSULE at 21:15

## 2025-02-14 ASSESSMENT — PAIN - FUNCTIONAL ASSESSMENT: PAIN_FUNCTIONAL_ASSESSMENT: 0-10

## 2025-02-14 ASSESSMENT — PAIN SCALES - GENERAL
PAINLEVEL_OUTOF10: 0

## 2025-02-14 ASSESSMENT — PAIN SCALES - WONG BAKER: WONGBAKER_NUMERICALRESPONSE: NO HURT

## 2025-02-14 NOTE — ED NOTES
MD notified of no urine output and attempts at trying to locate a bladder scanner via perfectserve. Awaiting response.

## 2025-02-14 NOTE — H&P
Hospitalist Admission Note    NAME:   Josue Cruz   : 1945   MRN: [id number]     Date/Time: 2025 12:13 PM    Patient PCP: No primary care provider on file.    ______________________________________________________________________  Given the patient's current clinical presentation, I have a high level of concern for decompensation if discharged from the emergency department.  Complex decision making was performed, which includes reviewing the patient's available past medical records, laboratory results, and x-ray films.       My assessment of this patient's clinical condition and my plan of care is as follows.    Assessment / Plan:    Principal Problem:    Sepsis (HCC)  Active Problems:    Influenza A    Moderate aortic stenosis by prior echocardiogram    Dementia (HCC)  Resolved Problems:    * No resolved hospital problems. *    Plans:    I have used the following medications:    Current Facility-Administered Medications:     ondansetron (ZOFRAN) injection 4 mg, 4 mg, IntraVENous, Q4H PRN, Yandel Landeros MD    sodium chloride flush 0.9 % injection 5-40 mL, 5-40 mL, IntraVENous, 2 times per day, Yandel Landeros MD    sodium chloride flush 0.9 % injection 5-40 mL, 5-40 mL, IntraVENous, PRN, Yandel Landeros MD    0.9 % sodium chloride infusion, , IntraVENous, PRN, Yandel Landeros MD    enoxaparin (LOVENOX) injection 40 mg, 40 mg, SubCUTAneous, Daily, Yandel Landeros MD, 40 mg at 25 1303    acetaminophen (TYLENOL) tablet 650 mg, 650 mg, Oral, Q6H PRN **OR** acetaminophen (TYLENOL) suppository 650 mg, 650 mg, Rectal, Q6H PRN, Yandel Landeros MD    0.9 % sodium chloride IV bolus 2,670 mL, 30 mL/kg, IntraVENous, Once, Yandel Landeros MD, Held at 25 1234    0.9 % sodium chloride infusion, , IntraVENous, Continuous, Yandel Landeros MD, Last Rate: 150 mL/hr at 25 1300, New Bag at 25 1300    oseltamivir (TAMIFLU) capsule 75 mg, 75 mg, Oral, BID,

## 2025-02-14 NOTE — CARE COORDINATION
Care Management Initial Assessment       RUR: 22% high risk for readmission   Readmission? Yes - 1/12-1/16/25 at [facility] for Hypotension  1st IM letter given? Patient Access to provide   1st  letter given: No      Initial note: Chart review completed prior to assessment. CM completed assessment with pt's spouse, Mariann Cruz, via phone due to isolation precautions. CM introduced self, role of CM, verified demographics to ensure accuracy, and discussed transition of care planning. Pt is known to reside LTC in memory care unit at [facility]. [facility] is long-term care payor source due to pt being 100% service connected with [facility]. Preference to transfer forms and admission clinicals faxed today to [facility] Clinical Command [phone].     Per spouse, pt is dependent with ADLs at baseline, and requires support for transfers from hospital bed to wheelchair. Pt is wheelchair-bound at baseline. Pt will require medical transport at time of d/c. Plan for transition back [facility] setting [facility] once medically stable.    Care management will continue to be available to assist as transition of care planning needs arise. Full readmission assessment below:     02/14/25 4149   Service Assessment   Patient Orientation Other (see comment)  (Assessment completed with pt's spouse, Mariann Cruz [phone])   Cognition Other (see comment)  (Assessment completed with pt's spouse, Mariann Cruz [phone])   History Provided By Spouse   Primary Caregiver Other (Comment)  ([facility] at [facility])   Accompanied By/Relationship N/A   Support Systems Spouse/Significant Other;Other (Comment)  ([facility]-term care - [facility])   Patient's Healthcare Decision Maker is: Legal Next of Kin   PCP Verified by CM Yes  (Followed by [facility] at [facility] and Dr. Jennings at [facility] Green Team)   Last Visit to PCP Within last 3 months   Prior Functional Level Assistance with the  following:;Bathing;Dressing;Toileting;Feeding;Cooking;Housework;Shopping;Mobility   Current Functional Level Assistance with the following:;Bathing;Dressing;Toileting;Cooking;Feeding;Housework;Shopping;Mobility   Can patient return to prior living arrangement Yes   Family able to assist with home care needs: No   Would you like for me to discuss the discharge plan with any other family members/significant others, and if so, who? Yes  (Spouse, Mariann Cruz [phone])   Financial Resources Medicare;[place]   Community Resources Other (Comment)  (Long-term care placement at [facility]/Memory Care)   Social/Functional History   Lives With Other (Comment)  (LTC @ [facility])   Type of Home Facility   Home Layout One level   Home Access Level entry   Bathroom Shower/Tub Walk-in shower;Shower chair with back   Bathroom Equipment Grab bars in shower   Bathroom Accessibility Accessible   Home Equipment Wheelchair - Manual;Hospital bed   Receives Help From Personal care attendant   Prior Level of Assist for ADLs Needs assistance   Toileting Needs assistance   Ambulation Assistance Non-ambulatory   Prior Level of Assist for Transfers Needs assistance   Active  No   Patient's  Info [facility]   Mode of Transportation Van   Occupation Retired   Discharge Planning   Type of Residence Long-Term Care   Living Arrangements Other (Comment)  (LT @ [facility])   Current Services Prior To Admission Skilled Nursing Facility   Potential Assistance Needed Skilled Nursing Facility   DME Ordered? No   Potential Assistance Purchasing Medications No   Type of Home Care Services None   Patient expects to be discharged to: Long-term care        02/14/25 1603   Readmission Assessment   Number of Days since last admission? 8-30 days   Previous Disposition Long Term Care   Who is being Interviewed Caregiver  (Spouse, Mariann Cruz)   What was the patient's/caregiver's perception as to why they think they needed to return back

## 2025-02-14 NOTE — PROGRESS NOTES
Pharmacy Antimicrobial Kinetic Dosing    Indication for Antimicrobials: cap/sepsis     Current Regimen of Each Antimicrobial:  Vancomycin pharmacy to dose; Start Date ; Day # 1  Zosyn 4.5g, then 3.375g q 8h (start: , day )    Previous Antimicrobial Therapy:  Ctx x 1:     Goal Level: Vancomycin -600    Date Dose & Interval Measured (mcg/mL) Predicted AUC 24-48 Predicted AUC 24,ss                          Significant Cultures:   : Bcx: ngtd, day 1  : mrsa (nasaL), in progress    Labs:  Recent Labs     Units 25  0928 25  0925   CREATININE MG/DL 0.78 0.7   BUN MG/DL 12  --    PROCAL ng/mL 0.15  --    WBC K/uL 9.5  --      Temp (24hrs), Av.6 °F (37.6 °C), Min:98.4 °F (36.9 °C), Max:100.7 °F (38.2 °C)      Conditions for Dosing Consideration: None    Creatinine Clearance (mL/min): Estimated Creatinine Clearance: 86 mL/min (based on SCr of 0.78 mg/dL).       Impression/Plan:   Vancomycin 2000mg load, then 1000mg q 12h  Predicted BDH20-04 = 461, Predicted AUC24,ss = 507  Vancomycin random on  if still on vanco  Renal function panel through   Antimicrobial stop date 7days zosyn, tbd for vanco     Pharmacy will follow daily and adjust medications as appropriate for renal function and/or serum levels.    Thank you,  Steven Maria RPH

## 2025-02-14 NOTE — ED NOTES
Pt incontinent of stool and cleaned at bedside with IP RN. Pt bladder scanned by this RN and noted to have 790. Straight cath performed with IP RN and 590 drained, MD notified. Reviewed wounds with IP RN.

## 2025-02-14 NOTE — PROGRESS NOTES
Pharmacy Medication History Review    Medication history obtained by Steven Maria RPH while patient was in room ER20/20 and was completed based on information available during current patient encounter. Medication history was completed before home meds were reconciled by provider.      PTA medication list was corrected to the following:   Prior to Admission Medications   Prescriptions Last Dose Informant   Calcium Carbonate-Vitamin D (OYSTER SHELL CALCIUM/D) 500-5 MG-MCG TABS 2/14/2025 Transfer Papers/EMS   Sig: Take 1 tablet by mouth 2 times daily   Multiple Vitamin (MULTIVITAMIN) TABS tablet 2/14/2025 Transfer Papers/EMS   Sig: Take 1 tablet by mouth daily   OLANZapine (ZYPREXA) 5 MG tablet 2/14/2025 Transfer Papers/EMS   Sig: Take 0.5 tablets by mouth in the morning and at bedtime   acetaminophen (TYLENOL) 325 MG tablet 2/14/2025 Transfer Papers/EMS   Sig: Take 2 tablets by mouth every 6 hours as needed for Pain or Fever   ammonium lactate (LAC-HYDRIN) 5 % LOTN lotion 2/14/2025 Transfer Papers/EMS   Sig: Apply 1 each topically daily Apply to dry skin topically every day shift for dry skin   ascorbic acid (VITAMIN C) 500 MG tablet 2/14/2025 Transfer Papers/EMS   Sig: Take 1 tablet by mouth daily   aspirin 81 MG EC tablet 2/14/2025 Transfer Papers/EMS   Sig: Take 1 tablet by mouth daily   calcium carbonate (TUMS) 500 MG chewable tablet 2/13/2025 Transfer Papers/EMS   Sig: Take 1 tablet by mouth 2 times daily   divalproex (DEPAKOTE SPRINKLE) 125 MG DR capsule 2/14/2025 Transfer Papers/EMS   Sig: Take 1 capsule by mouth 2 times daily   donepezil (ARICEPT) 10 MG tablet 2/14/2025 Transfer Papers/EMS   Sig: Take 1 tablet by mouth daily   guaiFENesin (ROBITUSSIN) 100 MG/5ML liquid Past Week Transfer Papers/EMS   Sig: Take 15 mLs by mouth every 8 hours as needed for Cough or Congestion   melatonin 3 MG TABS tablet 2/13/2025 Transfer Papers/EMS   Sig: Take 1 tablet by mouth at bedtime   midodrine (PROAMATINE) 5 MG tablet

## 2025-02-14 NOTE — PROGRESS NOTES
Pt arrived to floor from er at this time. Pt on 2l nc. Wheezing noted.93%. congested cough. Vss. Pt had a BM. Pt was bladder scanned. Pt unable to void. Straight cath completed for 550 ml. ER nurse collected urine sample. Pt is alert to self and place. Bed alarm is on. Pt given iv steriods and started antibiotic.    Skin: sacral area red. Small dime size buttox that is purple and not blanchable. Will label this a DTI. Surrounding area is red and blanches. Zinc cream applied. Pt has a pressure injury to left hip. Stage 3. Heels are red but blanchable redness under abdominal folds. Unable to access rover phone as it is locked up and no staff has a key. Communicated with charge nurse and will get night charge nurse to make sure pictures are taken.     4 Eyes Skin Assessment     NAME:  Josue Cruz  YOB: 1945  MEDICAL RECORD NUMBER:  [id number]    The patient is being assessed for  Admission    I agree that at least one RN has performed a thorough Head to Toe Skin Assessment on the patient. ALL assessment sites listed below have been assessed.      Areas assessed by both nurses:    Shoulders, Back, Chest, Sacrum. Buttock, Coccyx, Ischium, and Legs. Feet and Heels        Does the Patient have a Wound? Yes wound(s) were present on assessment. LDA wound assessment was Initiated and completed by CARMITA Suarez Prevention initiated by RN: Yes  Wound Care Orders initiated by RN: Yes    Pressure Injury (Stage 3,4, Unstageable, DTI, NWPT, and Complex wounds) if present, place Wound referral order by RN under : No Jay stated he will place wocn consult     New Ostomies, if present place, Ostomy referral order under : No     Nurse 1 eSignature: Electronically signed by Andres Kruse RN on 2/14/25 at 7:21 PM EST    **SHARE this note so that the co-signing nurse can place an eSignature**                                                  Report given to Jay VIZCARRA via sbar. Dual skin complete

## 2025-02-14 NOTE — ED PROVIDER NOTES
[facility] EMERGENCY DEPARTMENT  EMERGENCY DEPARTMENT ENCOUNTER       Pt Name: Josue Cruz  MRN: [id number]  Birthdate 1945  Date of evaluation: 2/14/2025  Provider: Lizett Stephenson MD   PCP: No primary care provider on file.  Note Started: 9:02 AM EST 2/14/25     CHIEF COMPLAINT       Chief Complaint   Patient presents with   • Hypotension     Pt BIBEMS from [facility] with flu like symptoms with fever and cough. Also noted pt was hypotensive at 88/41, states pt states hypotensive medications and unknown if facility administered.         HISTORY OF PRESENT ILLNESS: 1 or more elements      History From: EMS, History limited by: dementia     Josue Cruz is a 79 y.o. male who presents from [facility] for cough, fever, and low blood pressure.       Please See MDM for Additional Details of the HPI/PMH  Nursing Notes were all reviewed and agreed with or any disagreements were addressed in the HPI.     REVIEW OF SYSTEMS        Positives and Pertinent negatives as per HPI.    PAST HISTORY     Past Medical History:  Past Medical History:   Diagnosis Date   • Anxiety    • Asthma    • Atherosclerotic heart disease of native coronary artery without angina pectoris    • COVID-19 01/16/2025   • Dementia (HCC)    • Depression    • Disruptive mood dysregulation disorder (HCC)    • Gastroesophageal reflux disease without esophagitis    • Hypokalemia    • Localized edema    • Pneumonia    • PTSD (post-traumatic stress disorder)        Past Surgical History:  History reviewed. No pertinent surgical history.    Family History:  History reviewed. No pertinent family history.    Social History:  Social History     Tobacco Use   • Smoking status: Never   • Smokeless tobacco: Never       Allergies:  No Known Allergies    CURRENT MEDICATIONS      Previous Medications    ASCORBIC ACID (VITAMIN C) 500 MG TABLET    Take 1 tablet by mouth daily    ASPIRIN 81 MG CHEWABLE TABLET    Take 1 tablet by mouth daily    CALCIUM  derangements or anemia.   Will check troponin and ekg to rule out ACS  Will check chest XR to rule out focal airspace disease/fluid overload/PTX  Will check covid and flu swab.  Will check urinalysis to rule out UTI      Problems Addressed:  Influenza A: acute illness or injury    Amount and/or Complexity of Data Reviewed  Labs: ordered. Decision-making details documented in ED Course.  Radiology: ordered. Decision-making details documented in ED Course.  ECG/medicine tests: ordered and independent interpretation performed. Decision-making details documented in ED Course.    Risk  OTC drugs.  Prescription drug management.  Decision regarding hospitalization.          CLINICAL MANAGEMENT TOOLS:  Not Applicable    [facility] ED SEPSIS NOTE:     9:02 AM EST The patient now meets criteria for: Septic Shock    Fluid resuscitation with: 30 mL/kg crystalloid bolus  Due to concern for rapidly advancing infection and deterioration of patient's condition, antibiotics are started STAT and cultures ordered.        I've performed a sepsis reassessment of the patient's clinical volume status and tissue perfusion at time 11:05 AM EST    ED Course as of 02/14/25 1517   Fri Feb 14, 2025   0910 EKG performed at 9:05 AM shows a sinus rhythm with premature supraventricular complexes, rate of 78, no acute ischemic changes per my interpretation [CHEN]   1017 Rapid Influenza A By PCR(!): Detected [CHEN]   1149 Discussed with Dr. Telles, hospitalist, for admission [CHEN]      ED Course User Index  [CHEN] Lizett Stephenson MD       Labs without acute severe concerning derangements  XR without focal infiltrate    FINAL IMPRESSION     1. Influenza A    2. Sepsis, due to unspecified organism, unspecified whether acute organ dysfunction present (HCC)          DISPOSITION/PLAN       CLINICAL IMPRESSION    Admit Note: Pt is being admitted by Dr. Telles. The results of their tests and reason(s) for their admission have been discussed with pt

## 2025-02-15 PROBLEM — N39.0 UTI (URINARY TRACT INFECTION): Status: ACTIVE | Noted: 2025-02-15

## 2025-02-15 PROBLEM — E27.40 ADRENAL INSUFFICIENCY: Status: ACTIVE | Noted: 2025-02-15

## 2025-02-15 PROBLEM — Z66 DNR (DO NOT RESUSCITATE): Status: ACTIVE | Noted: 2025-02-15

## 2025-02-15 LAB
ALBUMIN SERPL-MCNC: 1.9 G/DL (ref 3.5–5)
ANION GAP SERPL CALC-SCNC: 6 MMOL/L (ref 2–12)
BACTERIA SPEC CULT: NORMAL
BACTERIA SPEC CULT: NORMAL
BASOPHILS # BLD: 0.01 K/UL (ref 0–0.1)
BASOPHILS NFR BLD: 0.2 % (ref 0–1)
BUN SERPL-MCNC: 13 MG/DL (ref 6–20)
BUN/CREAT SERPL: 24 (ref 12–20)
CALCIUM SERPL-MCNC: 7.9 MG/DL (ref 8.5–10.1)
CHLORIDE SERPL-SCNC: 112 MMOL/L (ref 97–108)
CO2 SERPL-SCNC: 23 MMOL/L (ref 21–32)
CORTIS SERPL-MCNC: 17.4 UG/DL
CREAT SERPL-MCNC: 0.54 MG/DL (ref 0.7–1.3)
DIFFERENTIAL METHOD BLD: ABNORMAL
EKG ATRIAL RATE: 78 BPM
EKG DIAGNOSIS: NORMAL
EKG P AXIS: 44 DEGREES
EKG P-R INTERVAL: 180 MS
EKG Q-T INTERVAL: 458 MS
EKG QRS DURATION: 100 MS
EKG QTC CALCULATION (BAZETT): 522 MS
EKG R AXIS: -39 DEGREES
EKG T AXIS: -6 DEGREES
EKG VENTRICULAR RATE: 78 BPM
EOSINOPHIL # BLD: 0 K/UL (ref 0–0.4)
EOSINOPHIL NFR BLD: 0 % (ref 0–7)
ERYTHROCYTE [DISTWIDTH] IN BLOOD BY AUTOMATED COUNT: 16 % (ref 11.5–14.5)
GLUCOSE SERPL-MCNC: 90 MG/DL (ref 65–100)
HCT VFR BLD AUTO: 28.3 % (ref 36.6–50.3)
HGB BLD-MCNC: 8.6 G/DL (ref 12.1–17)
IMM GRANULOCYTES # BLD AUTO: 0.04 K/UL (ref 0–0.04)
IMM GRANULOCYTES NFR BLD AUTO: 0.7 % (ref 0–0.5)
LYMPHOCYTES # BLD: 0.37 K/UL (ref 0.8–3.5)
LYMPHOCYTES NFR BLD: 6.2 % (ref 12–49)
MCH RBC QN AUTO: 29 PG (ref 26–34)
MCHC RBC AUTO-ENTMCNC: 30.4 G/DL (ref 30–36.5)
MCV RBC AUTO: 95.3 FL (ref 80–99)
MONOCYTES # BLD: 0.19 K/UL (ref 0–1)
MONOCYTES NFR BLD: 3.2 % (ref 5–13)
NEUTS SEG # BLD: 5.33 K/UL (ref 1.8–8)
NEUTS SEG NFR BLD: 89.7 % (ref 32–75)
NRBC # BLD: 0 K/UL (ref 0–0.01)
NRBC BLD-RTO: 0 PER 100 WBC
PHOSPHATE SERPL-MCNC: 3.3 MG/DL (ref 2.6–4.7)
PLATELET # BLD AUTO: 214 K/UL (ref 150–400)
PMV BLD AUTO: 10.7 FL (ref 8.9–12.9)
POTASSIUM SERPL-SCNC: 3.5 MMOL/L (ref 3.5–5.1)
RBC # BLD AUTO: 2.97 M/UL (ref 4.1–5.7)
SERVICE CMNT-IMP: NORMAL
SODIUM SERPL-SCNC: 141 MMOL/L (ref 136–145)
WBC # BLD AUTO: 5.9 K/UL (ref 4.1–11.1)

## 2025-02-15 PROCEDURE — 80069 RENAL FUNCTION PANEL: CPT

## 2025-02-15 PROCEDURE — 6360000002 HC RX W HCPCS: Performed by: INTERNAL MEDICINE

## 2025-02-15 PROCEDURE — 2700000000 HC OXYGEN THERAPY PER DAY

## 2025-02-15 PROCEDURE — 2500000003 HC RX 250 WO HCPCS: Performed by: INTERNAL MEDICINE

## 2025-02-15 PROCEDURE — 85025 COMPLETE CBC W/AUTO DIFF WBC: CPT

## 2025-02-15 PROCEDURE — 36415 COLL VENOUS BLD VENIPUNCTURE: CPT

## 2025-02-15 PROCEDURE — 51798 US URINE CAPACITY MEASURE: CPT

## 2025-02-15 PROCEDURE — 51701 INSERT BLADDER CATHETER: CPT

## 2025-02-15 PROCEDURE — 2060000000 HC ICU INTERMEDIATE R&B

## 2025-02-15 PROCEDURE — 6370000000 HC RX 637 (ALT 250 FOR IP): Performed by: INTERNAL MEDICINE

## 2025-02-15 PROCEDURE — 2580000003 HC RX 258: Performed by: INTERNAL MEDICINE

## 2025-02-15 RX ORDER — CASTOR OIL AND BALSAM, PERU 788; 87 MG/G; MG/G
OINTMENT TOPICAL 2 TIMES DAILY
Status: DISCONTINUED | OUTPATIENT
Start: 2025-02-15 | End: 2025-02-22 | Stop reason: HOSPADM

## 2025-02-15 RX ADMIN — DIVALPROEX SODIUM 125 MG: 125 CAPSULE ORAL at 20:46

## 2025-02-15 RX ADMIN — SODIUM CHLORIDE, PRESERVATIVE FREE 10 ML: 5 INJECTION INTRAVENOUS at 11:09

## 2025-02-15 RX ADMIN — Medication 3 MG: at 20:46

## 2025-02-15 RX ADMIN — MIDODRINE HYDROCHLORIDE 10 MG: 5 TABLET ORAL at 13:12

## 2025-02-15 RX ADMIN — MIDODRINE HYDROCHLORIDE 10 MG: 5 TABLET ORAL at 17:10

## 2025-02-15 RX ADMIN — SODIUM CHLORIDE: 9 INJECTION, SOLUTION INTRAVENOUS at 00:37

## 2025-02-15 RX ADMIN — SODIUM CHLORIDE, PRESERVATIVE FREE 10 ML: 5 INJECTION INTRAVENOUS at 20:46

## 2025-02-15 RX ADMIN — OLANZAPINE 2.5 MG: 2.5 TABLET, FILM COATED ORAL at 20:46

## 2025-02-15 RX ADMIN — PIPERACILLIN AND TAZOBACTAM 3375 MG: 3; .375 INJECTION, POWDER, LYOPHILIZED, FOR SOLUTION INTRAVENOUS at 02:46

## 2025-02-15 RX ADMIN — Medication: at 21:00

## 2025-02-15 RX ADMIN — ENOXAPARIN SODIUM 40 MG: 100 INJECTION SUBCUTANEOUS at 11:25

## 2025-02-15 RX ADMIN — VANCOMYCIN HYDROCHLORIDE 1000 MG: 1 INJECTION, POWDER, LYOPHILIZED, FOR SOLUTION INTRAVENOUS at 20:52

## 2025-02-15 RX ADMIN — PIPERACILLIN AND TAZOBACTAM 3375 MG: 3; .375 INJECTION, POWDER, LYOPHILIZED, FOR SOLUTION INTRAVENOUS at 18:22

## 2025-02-15 RX ADMIN — PIPERACILLIN AND TAZOBACTAM 3375 MG: 3; .375 INJECTION, POWDER, LYOPHILIZED, FOR SOLUTION INTRAVENOUS at 11:24

## 2025-02-15 RX ADMIN — OSELTAMIVIR PHOSPHATE 75 MG: 75 CAPSULE ORAL at 20:46

## 2025-02-15 RX ADMIN — MIDODRINE HYDROCHLORIDE 10 MG: 5 TABLET ORAL at 11:01

## 2025-02-15 RX ADMIN — CALCIUM CARBONATE (ANTACID) CHEW TAB 500 MG 500 MG: 500 CHEW TAB at 20:46

## 2025-02-15 RX ADMIN — PANTOPRAZOLE SODIUM 40 MG: 40 TABLET, DELAYED RELEASE ORAL at 06:29

## 2025-02-15 RX ADMIN — DIVALPROEX SODIUM 125 MG: 125 CAPSULE ORAL at 11:02

## 2025-02-15 RX ADMIN — HYDROCORTISONE SODIUM SUCCINATE 100 MG: 100 INJECTION, POWDER, FOR SOLUTION INTRAMUSCULAR; INTRAVENOUS at 00:37

## 2025-02-15 RX ADMIN — VANCOMYCIN HYDROCHLORIDE 1000 MG: 1 INJECTION, POWDER, LYOPHILIZED, FOR SOLUTION INTRAVENOUS at 11:29

## 2025-02-15 RX ADMIN — THERA TABS 1 TABLET: TAB at 11:02

## 2025-02-15 RX ADMIN — CALCIUM CARBONATE (ANTACID) CHEW TAB 500 MG 500 MG: 500 CHEW TAB at 11:02

## 2025-02-15 RX ADMIN — OSELTAMIVIR PHOSPHATE 75 MG: 75 CAPSULE ORAL at 11:02

## 2025-02-15 RX ADMIN — HYDROCORTISONE SODIUM SUCCINATE 100 MG: 100 INJECTION, POWDER, FOR SOLUTION INTRAMUSCULAR; INTRAVENOUS at 17:10

## 2025-02-15 RX ADMIN — ZINC SULFATE 220 MG (50 MG) CAPSULE 50 MG: CAPSULE at 11:03

## 2025-02-15 RX ADMIN — ASPIRIN 81 MG: 81 TABLET, COATED ORAL at 11:02

## 2025-02-15 RX ADMIN — OLANZAPINE 2.5 MG: 2.5 TABLET, FILM COATED ORAL at 11:03

## 2025-02-15 RX ADMIN — SERTRALINE 100 MG: 50 TABLET, FILM COATED ORAL at 11:02

## 2025-02-15 RX ADMIN — HYDROCORTISONE SODIUM SUCCINATE 100 MG: 100 INJECTION, POWDER, FOR SOLUTION INTRAMUSCULAR; INTRAVENOUS at 11:52

## 2025-02-15 ASSESSMENT — PAIN SCALES - GENERAL
PAINLEVEL_OUTOF10: 0
PAINLEVEL_OUTOF10: 0

## 2025-02-15 NOTE — PROGRESS NOTES
Bedside shift change report given to CARMITA Sanchez (oncoming nurse) by CARMITA Thompson (offgoing nurse). Report included the following information Nurse Handoff Report and Cardiac Rhythm NSR to sinus bradycardia .      End of Shift Note    Bedside shift change report given to CARMITA Thompson (oncoming nurse) by Laura Franklin RN (offgoing nurse).  Report included the following information SBAR, Kardex, and Cardiac Rhythm Sinus bradycardia to NSR    Shift worked:  Day shift     Shift summary and any significant changes:     Patient was very confused today and had moments of agitation. Per family, this is the usual for him. Wound care done to left hip. Heart rhythm was sinus bradycardia to NSR today. IV fluids stopped per MD due to wheezing.   Patient retained urine near end of shift, so straight cath was done of 400 mL clear yellow urine.    Concerns for physician to address:  --bradycardia  --wheezing   Zone phone for oncoming shift:   [phone]       Activity:  Level of Assistance: Moderate assist, patient does 50-74%  Number times ambulated in hallways past shift: 0  Number of times OOB to chair past shift: 0    Cardiac:   Cardiac Monitoring: Yes      Cardiac Rhythm: Sinus lizz/NSR    Access:  Current line(s): PIV     Genitourinary:   Urinary Status: Voiding, External catheter    Respiratory:   O2 Device: None (Room air)  Chronic home O2 use?: NO  Incentive spirometer at bedside:     GI:  Last BM (including prior to admit):  (PTA)  Current diet:  ADULT DIET; Regular  Passing flatus: YES    Pain Management:   Patient states pain is manageable on current regimen: YES    Skin:  Erick Scale Score: 15  Interventions: Wound Offloading (Prevention Methods): Elevate heels, Pillows, Repositioning    Patient Safety:  Fall Risk: Nursing Judgement-Fall Risk High(Add Comments): Yes  Fall Risk Interventions  Nursing Judgement-Fall Risk High(Add Comments): Yes  Toilet Every 2 Hours-In Advance of Need: Yes  Hourly Visual Checks: Awake, In bed  Fall

## 2025-02-15 NOTE — PROGRESS NOTES
Pharmacy Antimicrobial Kinetic Dosing    Indication for Antimicrobials: cap/sepsis     Current Regimen of Each Antimicrobial:  Vancomycin pharmacy to dose; Start Date ; Day # 2  Zosyn 4.5g, then 3.375g q 8h (start: , day )    Previous Antimicrobial Therapy:  Ctx x 1:     Goal Level: Vancomycin -600    Date Dose & Interval Measured (mcg/mL) Predicted AUC 24-48 Predicted AUC 24,ss    1000mg q 12h                      Significant Cultures:   : Bcx: ngtd, day 1  : mrsa (nasaL), in progress    Labs:  Recent Labs     Units 02/15/25  0438 25  1257 25  0928 25  0925   CREATININE MG/DL 0.54*  --  0.78 0.7   BUN MG/DL 13  --  12  --    PROCAL ng/mL  --  0.13 0.15  --    WBC K/uL 5.9  --  9.5  --      Temp (24hrs), Av.6 °F (36.4 °C), Min:96.8 °F (36 °C), Max:98.2 °F (36.8 °C)      Conditions for Dosing Consideration: None    Creatinine Clearance (mL/min): Estimated Creatinine Clearance: 124 mL/min (A) (based on SCr of 0.54 mg/dL (L)).       Impression/Plan:   Vancomycin 2000mg load, then 1000mg q 12h  Predicted DBN91-28 = 461, Predicted AUC24,ss = 507  Vancomycin random on  if still on vanco  Renal function panel through   Antimicrobial stop date 7days zosyn, tbd for vanco     Pharmacy will follow daily and adjust medications as appropriate for renal function and/or serum levels.    Thank you,  Steven Maria RP

## 2025-02-15 NOTE — PROGRESS NOTES
Occupational Therapy note:    Order received and acknowledged, cleared for therapy by nursing. Called [facility] where patient is currently a resident in their memory care unit. Per staff, patient is able to feed himself at baseline but is dependent for bathing, dressing, grooming and toileting. He is also x1 person assist to transfers from bed > w/c and required assist for pushing w/c. Patient received semisupine in bed yelling out and appearing agitated. Attempted to redirect and deescalate but patient became increasingly frustrated with RN notified and in room to assist. RN confirmed patient has been feeding himself without assist. Patient appears to be at his baseline for ADLs and does not require acute OT services. Will complete order and sign off.    Sobia Rehman, OTR/L, OTD

## 2025-02-15 NOTE — PROGRESS NOTES
Hospitalist Progress Note    NAME:   Josue Cruz   : 1945   MRN: [id number]     Date/Time: 2/15/2025 8:03 AM  Patient PCP: No primary care provider on file.    Estimated discharge date: 25  Barriers: treatimg FLU      Assessment / Plan:  Principal Problem:    Sepsis (HCC)  Active Problems:    Hypotension    Influenza A    Dementia (HCC)    UTI (urinary tract infection)    Moderate aortic stenosis by prior echocardiogram    Adrenal insufficiency (HCC)  Resolved Problems:    * No resolved hospital problems. *    Plan:  Cont current Abx plus Tamiflu  Cont stress dose Hydrocortison  Stop IVF sounding too wet            Medical Decision Making:   I personally reviewed labs:yes  I personally reviewed imaging:NA  I personally reviewed EKG:NA  Toxic drug monitoring: Zosyn and Miki  Discussed case with: RN        Code Status: DNR  DVT Prophylaxis: Lovenox  GI Prophylaxis:PPI    Subjective:     Chief Complaint / Reason for Physician Visit  \"Patient much more awake and alert and now making some conversation maybe fantastical but he is fluent his blood pressure is better\".  Discussed with RN events overnight.       Objective:     VITALS:   Last 24hrs VS reviewed since prior progress note. Most recent are:  Patient Vitals for the past 24 hrs:   BP Temp Temp src Pulse Resp SpO2 Height Weight   02/15/25 0730 95/61 97 °F (36.1 °C) Axillary 70 19 94 % -- --   02/15/25 0245 -- 98 °F (36.7 °C) Oral -- -- -- -- --   25 2238 (!) 97/56 96.8 °F (36 °C) Axillary 51 20 93 % -- --   25 1915 -- 97.8 °F (36.6 °C) Oral -- -- -- -- --   25 1856 119/86 98.2 °F (36.8 °C) -- 91 26 -- -- --   25 1745 129/65 -- -- 60 14 100 % -- --   25 1740 -- -- -- 65 21 98 % -- --   25 1735 (!) 115/55 97.5 °F (36.4 °C) Oral 50 12 -- -- --   25 1645 (!) 116/52 -- -- 59 18 -- -- --   25 1630 (!) 112/51 -- -- (!) 49 19 -- -- --   25 1615 (!) 84/48 -- -- 52 17 -- -- --   25 1600 (!) 80/49

## 2025-02-15 NOTE — PROGRESS NOTES
Patient's skin assessment was done by Uriah PARK RN and Jay Arita RN with the following finding Pictures are in the notes.

## 2025-02-15 NOTE — PROGRESS NOTES
Physical Therapy    Received PT eval order. OT had just communicated with [facility]. [facility] where patient is currently a resident in their memory care unit staff reports patient is able to feed himself at baseline but is dependent for bathing, dressing, grooming and toileting. He is also x1 person assist to transfers from bed > w/c and required assist for pushing w/c. Patient received semisupine in bed yelling out and appearing agitated. Attempted to redirect and deescalate but patient became increasingly frustrated with RN notified and in room to assist. Unable to proceed with eval. Pt is expected to return to [facility]. May benefit from eval of OOB transfers to assess if at baseline. Will monitor and follow as appropriate.    Sophy Grace PT

## 2025-02-16 LAB
ALBUMIN SERPL-MCNC: 1.9 G/DL (ref 3.5–5)
ANION GAP SERPL CALC-SCNC: 6 MMOL/L (ref 2–12)
BACTERIA SPEC CULT: NORMAL
BASOPHILS # BLD: 0 K/UL (ref 0–0.1)
BASOPHILS NFR BLD: 0 % (ref 0–1)
BUN SERPL-MCNC: 16 MG/DL (ref 6–20)
BUN/CREAT SERPL: 24 (ref 12–20)
CALCIUM SERPL-MCNC: 8.4 MG/DL (ref 8.5–10.1)
CC UR VC: NORMAL
CHLORIDE SERPL-SCNC: 112 MMOL/L (ref 97–108)
CO2 SERPL-SCNC: 24 MMOL/L (ref 21–32)
CREAT SERPL-MCNC: 0.68 MG/DL (ref 0.7–1.3)
DIFFERENTIAL METHOD BLD: ABNORMAL
EOSINOPHIL # BLD: 0 K/UL (ref 0–0.4)
EOSINOPHIL NFR BLD: 0 % (ref 0–7)
ERYTHROCYTE [DISTWIDTH] IN BLOOD BY AUTOMATED COUNT: 16.1 % (ref 11.5–14.5)
GLUCOSE SERPL-MCNC: 149 MG/DL (ref 65–100)
HCT VFR BLD AUTO: 31 % (ref 36.6–50.3)
HGB BLD-MCNC: 9.4 G/DL (ref 12.1–17)
IMM GRANULOCYTES # BLD AUTO: 0.04 K/UL (ref 0–0.04)
IMM GRANULOCYTES NFR BLD AUTO: 0.6 % (ref 0–0.5)
LYMPHOCYTES # BLD: 0.66 K/UL (ref 0.8–3.5)
LYMPHOCYTES NFR BLD: 9.6 % (ref 12–49)
MCH RBC QN AUTO: 28.8 PG (ref 26–34)
MCHC RBC AUTO-ENTMCNC: 30.3 G/DL (ref 30–36.5)
MCV RBC AUTO: 95.1 FL (ref 80–99)
MONOCYTES # BLD: 0.31 K/UL (ref 0–1)
MONOCYTES NFR BLD: 4.5 % (ref 5–13)
NEUTS SEG # BLD: 5.86 K/UL (ref 1.8–8)
NEUTS SEG NFR BLD: 85.3 % (ref 32–75)
NRBC # BLD: 0 K/UL (ref 0–0.01)
NRBC BLD-RTO: 0 PER 100 WBC
PHOSPHATE SERPL-MCNC: 2.9 MG/DL (ref 2.6–4.7)
PLATELET # BLD AUTO: 290 K/UL (ref 150–400)
PMV BLD AUTO: 11.1 FL (ref 8.9–12.9)
POTASSIUM SERPL-SCNC: 3.2 MMOL/L (ref 3.5–5.1)
RBC # BLD AUTO: 3.26 M/UL (ref 4.1–5.7)
SERVICE CMNT-IMP: NORMAL
SODIUM SERPL-SCNC: 142 MMOL/L (ref 136–145)
VANCOMYCIN SERPL-MCNC: 15.1 UG/ML
WBC # BLD AUTO: 6.9 K/UL (ref 4.1–11.1)

## 2025-02-16 PROCEDURE — 2500000003 HC RX 250 WO HCPCS: Performed by: INTERNAL MEDICINE

## 2025-02-16 PROCEDURE — 80202 ASSAY OF VANCOMYCIN: CPT

## 2025-02-16 PROCEDURE — 97530 THERAPEUTIC ACTIVITIES: CPT

## 2025-02-16 PROCEDURE — 36415 COLL VENOUS BLD VENIPUNCTURE: CPT

## 2025-02-16 PROCEDURE — 80069 RENAL FUNCTION PANEL: CPT

## 2025-02-16 PROCEDURE — 6360000002 HC RX W HCPCS: Performed by: INTERNAL MEDICINE

## 2025-02-16 PROCEDURE — 6370000000 HC RX 637 (ALT 250 FOR IP): Performed by: INTERNAL MEDICINE

## 2025-02-16 PROCEDURE — 2060000000 HC ICU INTERMEDIATE R&B

## 2025-02-16 PROCEDURE — 85025 COMPLETE CBC W/AUTO DIFF WBC: CPT

## 2025-02-16 PROCEDURE — 2580000003 HC RX 258: Performed by: INTERNAL MEDICINE

## 2025-02-16 PROCEDURE — 97161 PT EVAL LOW COMPLEX 20 MIN: CPT

## 2025-02-16 RX ORDER — HYDROCORTISONE 10 MG/1
20 TABLET ORAL 3 TIMES DAILY
Status: DISCONTINUED | OUTPATIENT
Start: 2025-02-16 | End: 2025-02-18

## 2025-02-16 RX ORDER — POTASSIUM CHLORIDE 750 MG/1
20 TABLET, EXTENDED RELEASE ORAL ONCE
Status: COMPLETED | OUTPATIENT
Start: 2025-02-16 | End: 2025-02-16

## 2025-02-16 RX ORDER — HYDROXYZINE HYDROCHLORIDE 25 MG/1
25 TABLET, FILM COATED ORAL EVERY 4 HOURS PRN
Status: DISCONTINUED | OUTPATIENT
Start: 2025-02-16 | End: 2025-02-22 | Stop reason: HOSPADM

## 2025-02-16 RX ADMIN — OSELTAMIVIR PHOSPHATE 75 MG: 75 CAPSULE ORAL at 20:36

## 2025-02-16 RX ADMIN — SODIUM CHLORIDE, PRESERVATIVE FREE 10 ML: 5 INJECTION INTRAVENOUS at 08:56

## 2025-02-16 RX ADMIN — HYDROXYZINE HYDROCHLORIDE 25 MG: 25 TABLET, FILM COATED ORAL at 16:57

## 2025-02-16 RX ADMIN — MIDODRINE HYDROCHLORIDE 10 MG: 5 TABLET ORAL at 11:40

## 2025-02-16 RX ADMIN — HYDROCORTISONE 20 MG: 10 TABLET ORAL at 20:37

## 2025-02-16 RX ADMIN — ASPIRIN 81 MG: 81 TABLET, COATED ORAL at 08:55

## 2025-02-16 RX ADMIN — SERTRALINE 100 MG: 50 TABLET, FILM COATED ORAL at 08:54

## 2025-02-16 RX ADMIN — ENOXAPARIN SODIUM 40 MG: 100 INJECTION SUBCUTANEOUS at 08:55

## 2025-02-16 RX ADMIN — VANCOMYCIN HYDROCHLORIDE 1000 MG: 1 INJECTION, POWDER, LYOPHILIZED, FOR SOLUTION INTRAVENOUS at 09:11

## 2025-02-16 RX ADMIN — DIVALPROEX SODIUM 125 MG: 125 CAPSULE ORAL at 08:55

## 2025-02-16 RX ADMIN — OSELTAMIVIR PHOSPHATE 75 MG: 75 CAPSULE ORAL at 08:55

## 2025-02-16 RX ADMIN — POTASSIUM CHLORIDE 20 MEQ: 750 TABLET, EXTENDED RELEASE ORAL at 08:59

## 2025-02-16 RX ADMIN — Medication: at 08:56

## 2025-02-16 RX ADMIN — THERA TABS 1 TABLET: TAB at 08:55

## 2025-02-16 RX ADMIN — HYDROXYZINE HYDROCHLORIDE 25 MG: 25 TABLET, FILM COATED ORAL at 20:37

## 2025-02-16 RX ADMIN — CALCIUM CARBONATE (ANTACID) CHEW TAB 500 MG 500 MG: 500 CHEW TAB at 20:37

## 2025-02-16 RX ADMIN — PANTOPRAZOLE SODIUM 40 MG: 40 TABLET, DELAYED RELEASE ORAL at 06:51

## 2025-02-16 RX ADMIN — BUSPIRONE HYDROCHLORIDE 15 MG: 10 TABLET ORAL at 16:57

## 2025-02-16 RX ADMIN — PIPERACILLIN AND TAZOBACTAM 3375 MG: 3; .375 INJECTION, POWDER, LYOPHILIZED, FOR SOLUTION INTRAVENOUS at 18:46

## 2025-02-16 RX ADMIN — Medication: at 20:35

## 2025-02-16 RX ADMIN — MIDODRINE HYDROCHLORIDE 10 MG: 5 TABLET ORAL at 08:55

## 2025-02-16 RX ADMIN — DIVALPROEX SODIUM 125 MG: 125 CAPSULE ORAL at 20:35

## 2025-02-16 RX ADMIN — CALCIUM CARBONATE (ANTACID) CHEW TAB 500 MG 500 MG: 500 CHEW TAB at 08:55

## 2025-02-16 RX ADMIN — MIDODRINE HYDROCHLORIDE 10 MG: 5 TABLET ORAL at 16:57

## 2025-02-16 RX ADMIN — OLANZAPINE 2.5 MG: 2.5 TABLET, FILM COATED ORAL at 20:37

## 2025-02-16 RX ADMIN — HYDROCORTISONE SODIUM SUCCINATE 100 MG: 100 INJECTION, POWDER, FOR SOLUTION INTRAMUSCULAR; INTRAVENOUS at 00:15

## 2025-02-16 RX ADMIN — OLANZAPINE 2.5 MG: 2.5 TABLET, FILM COATED ORAL at 08:54

## 2025-02-16 RX ADMIN — HYDROCORTISONE SODIUM SUCCINATE 100 MG: 100 INJECTION, POWDER, FOR SOLUTION INTRAMUSCULAR; INTRAVENOUS at 08:56

## 2025-02-16 RX ADMIN — Medication 3 MG: at 20:36

## 2025-02-16 RX ADMIN — PIPERACILLIN AND TAZOBACTAM 3375 MG: 3; .375 INJECTION, POWDER, LYOPHILIZED, FOR SOLUTION INTRAVENOUS at 11:36

## 2025-02-16 RX ADMIN — PIPERACILLIN AND TAZOBACTAM 3375 MG: 3; .375 INJECTION, POWDER, LYOPHILIZED, FOR SOLUTION INTRAVENOUS at 02:58

## 2025-02-16 RX ADMIN — HYDROCORTISONE 20 MG: 10 TABLET ORAL at 13:22

## 2025-02-16 RX ADMIN — SODIUM CHLORIDE, PRESERVATIVE FREE 10 ML: 5 INJECTION INTRAVENOUS at 20:38

## 2025-02-16 RX ADMIN — ZINC SULFATE 220 MG (50 MG) CAPSULE 50 MG: CAPSULE at 08:55

## 2025-02-16 ASSESSMENT — PAIN SCALES - WONG BAKER: WONGBAKER_NUMERICALRESPONSE: NO HURT

## 2025-02-16 ASSESSMENT — PAIN SCALES - GENERAL: PAINLEVEL_OUTOF10: 0

## 2025-02-16 NOTE — PROGRESS NOTES
End of Shift Note    Bedside shift change report given to Harrison VIZCARRA (oncoming nurse) by Estrellita Mcgraw RN (offgoing nurse).  Report included the following information SBAR    Shift worked:  0230-7860     Shift summary and any significant changes:    Patient confused throughout shift. Believing he is in [place]. Easy to redirect at times, but RN mostly unable to redirect. Continues IV abd therapy. X1 assist with gait belt and walker to BSC and recliner. X1 continent BM. Able to use urinal with assist. PRN Atarax added. Sacral redness/ stage 1 present. Hypotension. Midodrine scheduled. C/o right hip pain.      Concerns for physician to address:  See above     Zone phone for oncoming shift:          Activity:  Level of Assistance: Moderate assist, patient does 50-74%  Number times ambulated in hallways past shift: 0  Number of times OOB to chair past shift: 1    Cardiac:   Cardiac Monitoring: Yes      Cardiac Rhythm: Sinus lizz    Access:  Current line(s): PIV     Genitourinary:   Urinary Status: Voiding, External catheter (can use urinal)    Respiratory:   O2 Device: None (Room air)  Chronic home O2 use?: N/A  Incentive spirometer at bedside: YES    GI:  Last BM (including prior to admit): 02/16/25  Current diet:  ADULT DIET; Regular  Passing flatus: YES    Pain Management:   Patient states pain is manageable on current regimen: N/A    Skin:  Erick Scale Score: 14  Interventions: Wound Offloading (Prevention Methods): Elevate heels, Pillows, Repositioning    Patient Safety:  Fall Risk: Nursing Judgement-Fall Risk High(Add Comments): Yes  Fall Risk Interventions  Nursing Judgement-Fall Risk High(Add Comments): Yes  Toilet Every 2 Hours-In Advance of Need: Yes  Hourly Visual Checks: In bed, Awake  Fall Visual Posted: Armband, Socks  Room Door Open: Yes  Alarm On: Bed  Patient Moved Closer to Nursing Station: No    Active Consults:   IP CONSULT TO HOSPITALIST  IP CONSULT TO PHARMACY  IP CONSULT TO

## 2025-02-16 NOTE — PROGRESS NOTES
Pharmacy Antimicrobial Kinetic Dosing    Indication for Antimicrobials: cap/sepsis     Current Regimen of Each Antimicrobial:  Vancomycin pharmacy to dose; Start Date ; Day # 3  Zosyn 4.5g, then 3.375g q 8h (start: , day 3/7)    Previous Antimicrobial Therapy:  Ctx x 1:     Goal Level: Vancomycin -600    Date Dose & Interval Measured (mcg/mL) Predicted AUC 24-48 Predicted AUC 24,ss    0345 1000mg q 12h 15.1 438 447                   Significant Cultures:   : Bcx: ngtd, day 3  : mrsa (nasaL), not present:  : ucx: ngtd, day 3    Labs:  Recent Labs     Units 25  0345 02/15/25  0438 25  1257 25  0928   CREATININE MG/DL 0.68* 0.54*  --  0.78   BUN MG/DL 16 13  --  12   PROCAL ng/mL  --   --  0.13 0.15   WBC K/uL 6.9 5.9  --  9.5     Temp (24hrs), Av.1 °F (36.7 °C), Min:97.6 °F (36.4 °C), Max:99 °F (37.2 °C)      Conditions for Dosing Consideration: None    Creatinine Clearance (mL/min): Estimated Creatinine Clearance: 107 mL/min (A) (based on SCr of 0.68 mg/dL (L)).       Impression/Plan:   Vancomycin 2000mg load, then 1000mg q 12h  Predicted CMH10-13 = 438, Predicted AUC24,ss = 447  Vancomycin random on  if still on vanco  Renal function panel through   Antimicrobial stop date 7days zosyn, tbd for vanco     Pharmacy will follow daily and adjust medications as appropriate for renal function and/or serum levels.    Thank you,  Steven Maria RPH

## 2025-02-16 NOTE — PROGRESS NOTES
End of Shift Note    Bedside shift change report given to CARMITA Haro (oncoming nurse) by TAI LOONEY RN (offgoing nurse).  Report included the following information SBAR, Kardex, ED Summary, Intake/Output, MAR, Accordion, Recent Results, Med Rec Status, Cardiac Rhythm NS, and Quality Measures    Shift worked:  1900 - 0800      Shift summary and any significant changes:     None     Concerns for physician to address:  None     Zone phone for oncoming shift:   [phone]       Activity:  Level of Assistance: Moderate assist, patient does 50-74%  Number times ambulated in hallways past shift: 0  Number of times OOB to chair past shift: 0    Cardiac:   Cardiac Monitoring: Yes      Cardiac Rhythm: Sinus rhythm, Sinus lizz    Access:  Current line(s): PIV     Genitourinary:   Urinary Status: Voiding, External catheter    Respiratory:   O2 Device: None (Room air)  Chronic home O2 use?: NO  Incentive spirometer at bedside: NO    GI:  Last BM (including prior to admit): 02/15/25  Current diet:  ADULT DIET; Regular  Passing flatus: YES    Pain Management:   Patient states pain is manageable on current regimen: YES    Skin:  Erick Scale Score: 14  Interventions: Wound Offloading (Prevention Methods): Elevate heels, Pillows, Repositioning    Patient Safety:  Fall Risk: Nursing Judgement-Fall Risk High(Add Comments): Yes  Fall Risk Interventions  Nursing Judgement-Fall Risk High(Add Comments): Yes  Toilet Every 2 Hours-In Advance of Need: Yes  Hourly Visual Checks: Awake, In bed  Fall Visual Posted: Armband, Socks  Room Door Open: Yes  Alarm On: Bed  Patient Moved Closer to Nursing Station: No    Active Consults:   IP CONSULT TO HOSPITALIST  IP CONSULT TO PHARMACY    Length of Stay:  Expected LOS: 7  Actual LOS: 2    TAI LOONEY RN

## 2025-02-16 NOTE — PLAN OF CARE
Predictive Model Details          45 (Caution)  Factor Value    Calculated 2/16/2025 07:53 27% Age 79 years old    Deterioration Index Model 20% Neurological exam X     19% Systolic 88     15% Sven coma scale 14     7% Pulse oximetry 90 %     5% Respiratory rate 18     2% Potassium abnormal (3.2 mmol/L)     2% Hematocrit abnormal (31.0 %)     2% Sodium 142 mmol/L     1% Pulse 62     0% Temperature 98 °F (36.7 °C)     0% WBC count 6.9 K/uL        Problem: Discharge Planning  Goal: Discharge to home or other facility with appropriate resources  2/16/2025 0753 by Estrellita Mcgraw RN  Outcome: Progressing  2/15/2025 2348 by Jay Arita RN  Outcome: Progressing     Problem: Safety - Adult  Goal: Free from fall injury  2/16/2025 0753 by Estrellita Mcgraw RN  Outcome: Progressing  2/15/2025 2348 by Jay Arita RN  Outcome: Progressing     Problem: Pain  Goal: Verbalizes/displays adequate comfort level or baseline comfort level  2/16/2025 0753 by Estrellita Mcgraw RN  Outcome: Progressing  2/15/2025 2348 by Jay Arita RN  Outcome: Progressing     Problem: Skin/Tissue Integrity  Goal: Skin integrity remains intact  Description: 1.  Monitor for areas of redness and/or skin breakdown  2.  Assess vascular access sites hourly  3.  Every 4-6 hours minimum:  Change oxygen saturation probe site  4.  Every 4-6 hours:  If on nasal continuous positive airway pressure, respiratory therapy assess nares and determine need for appliance change or resting period  Outcome: Progressing  Flowsheets (Taken 2/16/2025 0730)  Skin Integrity Remains Intact: Monitor for areas of redness and/or skin breakdown

## 2025-02-16 NOTE — PLAN OF CARE
Problem: Discharge Planning  Goal: Discharge to home or other facility with appropriate resources  2/15/2025 2348 by Jay Arita RN  Outcome: Progressing  2/15/2025 1404 by Laura Franklin RN  Outcome: Progressing     Problem: Safety - Adult  Goal: Free from fall injury  2/15/2025 2348 by Jay Arita RN  Outcome: Progressing  2/15/2025 1404 by Laura Franklin RN  Outcome: Progressing     Problem: Pain  Goal: Verbalizes/displays adequate comfort level or baseline comfort level  2/15/2025 2348 by Jay Arita RN  Outcome: Progressing  2/15/2025 1404 by Laura Franklin RN  Outcome: Progressing

## 2025-02-16 NOTE — PROGRESS NOTES
PHYSICAL THERAPY EVALUATION/DISCHARGE    Patient: Josue Cruz (79 y.o. male)  Date: 2/16/2025  Primary Diagnosis: Influenza A [J10.1]  Sepsis (HCC) [A41.9]  Sepsis, due to unspecified organism, unspecified whether acute organ dysfunction present (HCC) [A41.9]       Precautions: Bed Alarm, Fall Risk, Other (Comment) (seatebelt alarm)                      ASSESSMENT AND RECOMMENDATIONS:  Based on the objective data below, the patient presented to ED from [facility] with increased confusion from baseline, found to be Flu Positive. Admitted for medical workup. Pt received for PT session confused but redirectable today. He performed bed mobility with SBA and CGA for supine > sit with rail. Sit <> stand EOB with min A via hand held assist. Pt pivoted bed > chair with Min A and hand held assist. Per chart and by phone call to facility 2/15/2025 (by OT), this is pt's baseline. He primarily transfers with 1 assist bed to  with assist for propelling WC. Pt scooted back in chair with min A, left seated in recliner end of session with all needs met, call bell in reach, chair alarm and safety belt alarm in place, BLE elevated. Pt is at his mobility baseline, will sign off acute care PT at this time.    Functional Outcome Measure:  The patient scored 14/24 on the [facility] outcome measure which is indicative of likely need for mobility assist at ID (pt's baseline).          Further skilled acute physical therapy is not indicated at this time.       PLAN :  Recommendation for discharge: (in order for the patient to meet his/her long term goals):   No skilled physical therapy - return to [facility]    Other factors to consider for discharge: no additional factors    IF patient discharges home will need the following DME: patient owns DME required for discharge at facility       SUBJECTIVE:   Patient stated “They are shooting at us, you have to be quiet.” (pt believes he is in [place], educated gently we are in the hospital

## 2025-02-16 NOTE — PROGRESS NOTES
Hospitalist Progress Note    NAME:   Josue Cruz   : 1945   MRN: [id number]     Date/Time: 2025 8:37 AM  Patient PCP: No primary care provider on file.    Estimated discharge date: 25  Barriers: treatimg FLU      Assessment / Plan:  Principal Problem:    Sepsis (HCC)  Active Problems:    Hypotension    Influenza A    Dementia (HCC)    UTI (urinary tract infection)    Adrenal insufficiency (HCC)    DNR (do not resuscitate)    Moderate aortic stenosis by prior echocardiogram  Resolved Problems:    * No resolved hospital problems. *    Plan:  2/15   Cont current Abx plus Tamiflu  Cont stress dose Hydrocortisone  Stop IVF sounding too wet    Sounds great  Confused as is his baseline  MRSA probe negative Vanco stopped  Taper to oral hydrocortisone            Medical Decision Making:   I personally reviewed labs:yes as above  I personally reviewed imaging:NA  I personally reviewed EKG:NA  Toxic drug monitoring: Zosyn and Vanc (stop Vanc)  Discussed case with: RN        Code Status: DNR  DVT Prophylaxis: Lovenox  GI Prophylaxis:PPI    Subjective:     Chief Complaint / Reason for Physician Visit  Patient  awake and alert and now making some conversation pleasant    Objective:     VITALS:   Last 24hrs VS reviewed since prior progress note. Most recent are:  Patient Vitals for the past 24 hrs:   BP Temp Temp src Pulse Resp SpO2 Weight   25 0715 (!) 88/52 98 °F (36.7 °C) Oral 62 18 90 % --   25 0600 -- -- -- -- -- -- 95.5 kg (210 lb 8.6 oz)   25 0350 (!) 97/59 97.6 °F (36.4 °C) Oral 53 18 94 % --   02/15/25 2355 (!) 104/54 98.1 °F (36.7 °C) Oral (!) 47 16 93 % --   02/15/25 1945 109/64 97.8 °F (36.6 °C) Oral 52 22 94 % --   02/15/25 1649 95/61 99 °F (37.2 °C) -- 66 21 -- --   02/15/25 1450 (!) 104/56 98.6 °F (37 °C) Oral 55 12 91 % --   02/15/25 1315 110/77 -- -- 55 23 94 % --   02/15/25 1130 (!) 99/49 -- -- 64 16 96 % --   02/15/25 1101 (!) 89/47 97.7 °F (36.5 °C) Oral -- -- -- --

## 2025-02-17 LAB
ALBUMIN SERPL-MCNC: 1.9 G/DL (ref 3.5–5)
ANION GAP SERPL CALC-SCNC: 6 MMOL/L (ref 2–12)
BASOPHILS # BLD: 0.01 K/UL (ref 0–0.1)
BASOPHILS NFR BLD: 0.1 % (ref 0–1)
BUN SERPL-MCNC: 21 MG/DL (ref 6–20)
BUN/CREAT SERPL: 23 (ref 12–20)
CALCIUM SERPL-MCNC: 8.5 MG/DL (ref 8.5–10.1)
CHLORIDE SERPL-SCNC: 110 MMOL/L (ref 97–108)
CO2 SERPL-SCNC: 26 MMOL/L (ref 21–32)
CREAT SERPL-MCNC: 0.91 MG/DL (ref 0.7–1.3)
DIFFERENTIAL METHOD BLD: ABNORMAL
EOSINOPHIL # BLD: 0 K/UL (ref 0–0.4)
EOSINOPHIL NFR BLD: 0 % (ref 0–7)
ERYTHROCYTE [DISTWIDTH] IN BLOOD BY AUTOMATED COUNT: 16.3 % (ref 11.5–14.5)
GLUCOSE SERPL-MCNC: 147 MG/DL (ref 65–100)
HCT VFR BLD AUTO: 30.9 % (ref 36.6–50.3)
HGB BLD-MCNC: 9.6 G/DL (ref 12.1–17)
IMM GRANULOCYTES # BLD AUTO: 0.06 K/UL (ref 0–0.04)
IMM GRANULOCYTES NFR BLD AUTO: 0.7 % (ref 0–0.5)
LYMPHOCYTES # BLD: 1.12 K/UL (ref 0.8–3.5)
LYMPHOCYTES NFR BLD: 12.8 % (ref 12–49)
MCH RBC QN AUTO: 28.8 PG (ref 26–34)
MCHC RBC AUTO-ENTMCNC: 31.1 G/DL (ref 30–36.5)
MCV RBC AUTO: 92.8 FL (ref 80–99)
MONOCYTES # BLD: 0.48 K/UL (ref 0–1)
MONOCYTES NFR BLD: 5.5 % (ref 5–13)
NEUTS SEG # BLD: 7.11 K/UL (ref 1.8–8)
NEUTS SEG NFR BLD: 80.9 % (ref 32–75)
NRBC # BLD: 0 K/UL (ref 0–0.01)
NRBC BLD-RTO: 0 PER 100 WBC
PHOSPHATE SERPL-MCNC: 2.7 MG/DL (ref 2.6–4.7)
PLATELET # BLD AUTO: 318 K/UL (ref 150–400)
PMV BLD AUTO: 10.7 FL (ref 8.9–12.9)
POTASSIUM SERPL-SCNC: 3.4 MMOL/L (ref 3.5–5.1)
RBC # BLD AUTO: 3.33 M/UL (ref 4.1–5.7)
SODIUM SERPL-SCNC: 142 MMOL/L (ref 136–145)
WBC # BLD AUTO: 8.8 K/UL (ref 4.1–11.1)

## 2025-02-17 PROCEDURE — 36415 COLL VENOUS BLD VENIPUNCTURE: CPT

## 2025-02-17 PROCEDURE — 94640 AIRWAY INHALATION TREATMENT: CPT

## 2025-02-17 PROCEDURE — 6360000002 HC RX W HCPCS: Performed by: INTERNAL MEDICINE

## 2025-02-17 PROCEDURE — 6370000000 HC RX 637 (ALT 250 FOR IP): Performed by: INTERNAL MEDICINE

## 2025-02-17 PROCEDURE — 85025 COMPLETE CBC W/AUTO DIFF WBC: CPT

## 2025-02-17 PROCEDURE — 2500000003 HC RX 250 WO HCPCS: Performed by: INTERNAL MEDICINE

## 2025-02-17 PROCEDURE — 80069 RENAL FUNCTION PANEL: CPT

## 2025-02-17 PROCEDURE — 2580000003 HC RX 258: Performed by: INTERNAL MEDICINE

## 2025-02-17 PROCEDURE — 2060000000 HC ICU INTERMEDIATE R&B

## 2025-02-17 PROCEDURE — 99222 1ST HOSP IP/OBS MODERATE 55: CPT | Performed by: NURSE PRACTITIONER

## 2025-02-17 RX ORDER — BUDESONIDE 0.5 MG/2ML
0.5 INHALANT ORAL
Status: DISCONTINUED | OUTPATIENT
Start: 2025-02-17 | End: 2025-02-22 | Stop reason: HOSPADM

## 2025-02-17 RX ORDER — POTASSIUM CHLORIDE 750 MG/1
20 TABLET, EXTENDED RELEASE ORAL 2 TIMES DAILY
Status: DISPENSED | OUTPATIENT
Start: 2025-02-17 | End: 2025-02-18

## 2025-02-17 RX ORDER — IPRATROPIUM BROMIDE AND ALBUTEROL SULFATE 2.5; .5 MG/3ML; MG/3ML
1 SOLUTION RESPIRATORY (INHALATION)
Status: DISCONTINUED | OUTPATIENT
Start: 2025-02-17 | End: 2025-02-22 | Stop reason: HOSPADM

## 2025-02-17 RX ORDER — POTASSIUM CHLORIDE 750 MG/1
20 TABLET, EXTENDED RELEASE ORAL ONCE
Status: COMPLETED | OUTPATIENT
Start: 2025-02-17 | End: 2025-02-17

## 2025-02-17 RX ORDER — ZIPRASIDONE MESYLATE 20 MG/ML
10 INJECTION, POWDER, LYOPHILIZED, FOR SOLUTION INTRAMUSCULAR ONCE
Status: COMPLETED | OUTPATIENT
Start: 2025-02-17 | End: 2025-02-17

## 2025-02-17 RX ADMIN — ZIPRASIDONE MESYLATE 10 MG: 20 INJECTION, POWDER, LYOPHILIZED, FOR SOLUTION INTRAMUSCULAR at 18:10

## 2025-02-17 RX ADMIN — HYDROCORTISONE 20 MG: 10 TABLET ORAL at 09:48

## 2025-02-17 RX ADMIN — THERA TABS 1 TABLET: TAB at 09:49

## 2025-02-17 RX ADMIN — ENOXAPARIN SODIUM 40 MG: 100 INJECTION SUBCUTANEOUS at 09:57

## 2025-02-17 RX ADMIN — OLANZAPINE 2.5 MG: 2.5 TABLET, FILM COATED ORAL at 09:49

## 2025-02-17 RX ADMIN — BUDESONIDE 500 MCG: 0.5 INHALANT RESPIRATORY (INHALATION) at 13:25

## 2025-02-17 RX ADMIN — BUSPIRONE HYDROCHLORIDE 15 MG: 10 TABLET ORAL at 09:48

## 2025-02-17 RX ADMIN — SODIUM CHLORIDE, PRESERVATIVE FREE 10 ML: 5 INJECTION INTRAVENOUS at 10:17

## 2025-02-17 RX ADMIN — HYDROXYZINE HYDROCHLORIDE 25 MG: 25 TABLET, FILM COATED ORAL at 02:19

## 2025-02-17 RX ADMIN — Medication: at 09:51

## 2025-02-17 RX ADMIN — PANTOPRAZOLE SODIUM 40 MG: 40 TABLET, DELAYED RELEASE ORAL at 10:21

## 2025-02-17 RX ADMIN — POTASSIUM CHLORIDE 20 MEQ: 750 TABLET, EXTENDED RELEASE ORAL at 11:48

## 2025-02-17 RX ADMIN — ZIPRASIDONE MESYLATE 10 MG: 20 INJECTION, POWDER, LYOPHILIZED, FOR SOLUTION INTRAMUSCULAR at 16:47

## 2025-02-17 RX ADMIN — MIDODRINE HYDROCHLORIDE 10 MG: 5 TABLET ORAL at 17:40

## 2025-02-17 RX ADMIN — POTASSIUM CHLORIDE 20 MEQ: 750 TABLET, EXTENDED RELEASE ORAL at 14:33

## 2025-02-17 RX ADMIN — OSELTAMIVIR PHOSPHATE 75 MG: 75 CAPSULE ORAL at 09:49

## 2025-02-17 RX ADMIN — IPRATROPIUM BROMIDE AND ALBUTEROL SULFATE 1 DOSE: 2.5; .5 SOLUTION RESPIRATORY (INHALATION) at 13:25

## 2025-02-17 RX ADMIN — CALCIUM CARBONATE (ANTACID) CHEW TAB 500 MG 500 MG: 500 CHEW TAB at 09:48

## 2025-02-17 RX ADMIN — PIPERACILLIN AND TAZOBACTAM 3375 MG: 3; .375 INJECTION, POWDER, LYOPHILIZED, FOR SOLUTION INTRAVENOUS at 02:13

## 2025-02-17 RX ADMIN — SERTRALINE 100 MG: 50 TABLET, FILM COATED ORAL at 09:47

## 2025-02-17 RX ADMIN — IPRATROPIUM BROMIDE AND ALBUTEROL SULFATE 1 DOSE: 2.5; .5 SOLUTION RESPIRATORY (INHALATION) at 19:51

## 2025-02-17 RX ADMIN — HYDROCORTISONE 20 MG: 10 TABLET ORAL at 14:33

## 2025-02-17 RX ADMIN — PIPERACILLIN AND TAZOBACTAM 3375 MG: 3; .375 INJECTION, POWDER, LYOPHILIZED, FOR SOLUTION INTRAVENOUS at 14:49

## 2025-02-17 RX ADMIN — BUDESONIDE 500 MCG: 0.5 INHALANT RESPIRATORY (INHALATION) at 19:51

## 2025-02-17 RX ADMIN — Medication: at 21:32

## 2025-02-17 RX ADMIN — MIDODRINE HYDROCHLORIDE 10 MG: 5 TABLET ORAL at 11:48

## 2025-02-17 RX ADMIN — MIDODRINE HYDROCHLORIDE 10 MG: 5 TABLET ORAL at 09:53

## 2025-02-17 RX ADMIN — ASPIRIN 81 MG: 81 TABLET, COATED ORAL at 09:48

## 2025-02-17 RX ADMIN — ZINC SULFATE 220 MG (50 MG) CAPSULE 50 MG: CAPSULE at 09:48

## 2025-02-17 RX ADMIN — HYDROXYZINE HYDROCHLORIDE 25 MG: 25 TABLET, FILM COATED ORAL at 16:56

## 2025-02-17 RX ADMIN — IPRATROPIUM BROMIDE AND ALBUTEROL SULFATE 1 DOSE: 2.5; .5 SOLUTION RESPIRATORY (INHALATION) at 16:53

## 2025-02-17 RX ADMIN — DIVALPROEX SODIUM 125 MG: 125 CAPSULE ORAL at 09:49

## 2025-02-17 ASSESSMENT — PAIN SCALES - GENERAL: PAINLEVEL_OUTOF10: 0

## 2025-02-17 NOTE — PROGRESS NOTES
0715 Bedside and Verbal shift change report given to CARMITA Beauchamp and CARMITA Haro (oncoming nurse) by CARMITA Flores (offgoing nurse). Report included the following information Nurse Handoff Report.     1214 Pt presented leaning forward in chair, states \"I can't breathe.\" Noted to have increased audible wheezing. Inspiratory and expiratory wheezes auscultated. Dr. Landeros alerted via secured messaging. Assessed at bedside, new orders obtained for respiratory tx and scheduled nebulizers- see orders.     1545 Pt became agitated when CARMITA Haro attempted to transfer pt to bed. Squeezed Estrellita's hand and began cussing. Code [id number] called. This RN along with Estrellita VIZCARRA and Juan F (security) attempted verbal de escalation and encouraged pt to transfer to bed. Transfer occurred with 2x assist. Pt believes this RN and Estrellita VIZCARRA are his daughter and wife, respectively. States \"I am going to break your f**ing neck\" to this RN due to covering with blanket in bed. Using much profanity and yelling at staff. Unable to redirect. CARMITA Haro currently at bedside due to pt attempting to get out of bed, unable to redirect.     End of Shift Note    Bedside shift change report given to CARMITA Amaya (oncoming nurse) by Quynh Marion RN (offgoing nurse).  Report included the following information SBAR    Shift worked:  0411-6927     Shift summary and any significant changes:     See notes from this RN and CARMITA Haro. Frequent behaviors this shift.      Concerns for physician to address:  See above     Zone phone for oncoming shift:          Activity:  Level of Assistance: Moderate assist, patient does 50-74%  Number times ambulated in hallways past shift: 0  Number of times OOB to chair past shift: 2    Cardiac:   Cardiac Monitoring: Yes      Cardiac Rhythm: Sinus lizz    Access:  Current line(s): PIV     Genitourinary:   Urinary Status: Voiding    Respiratory:   O2 Device: Nasal cannula  Chronic home O2 use?: NO  Incentive

## 2025-02-17 NOTE — PROGRESS NOTES
Hospitalist Progress Note    NAME:   Josue Cruz   : 1945   MRN: [id number]     Date/Time: 2025 11:19 AM  Patient PCP: No primary care provider on file.    Estimated discharge date: 25  Barriers: treatimg FLU      Assessment / Plan:  Principal Problem:    Sepsis (HCC)  Active Problems:    Hypotension    Influenza A    Dementia (HCC)    UTI (urinary tract infection)    Adrenal insufficiency (HCC)    DNR (do not resuscitate)    Moderate aortic stenosis by prior echocardiogram    Hyperkalemia    Plan:  2/15   Cont current Abx plus Tamiflu  Cont stress dose Hydrocortisone  Stop IVF sounding too wet      Sounds great  Confused as is his baseline  MRSA probe negative Vanco stopped  Taper to oral hydrocortisone      Wheezing again so component of copd/asthma  [id number]  Pulmicort neb BID  Cont Abx  Pleasant confusion  Replace K                Medical Decision Making:   I personally reviewed labs:yes as above  I personally reviewed imaging:NA  I personally reviewed EKG:NA  Toxic drug monitoring: Zosyn   Discussed case with: RN        Code Status: DNR  DVT Prophylaxis: Lovenox  GI Prophylaxis:PPI    Subjective:     Chief Complaint / Reason for Physician Visit  Patient  awake and alert and now making some conversation pleasant    Objective:     VITALS:   Last 24hrs VS reviewed since prior progress note. Most recent are:  Patient Vitals for the past 24 hrs:   BP Temp Temp src Pulse Resp SpO2   25 1053 108/60 97.6 °F (36.4 °C) Oral 52 15 94 %   25 0933 -- -- -- 64 23 --   25 0800 (!) 105/48 -- -- (!) 49 19 --   25 0715 (!) 89/54 97.7 °F (36.5 °C) Oral 51 18 93 %   25 0600 -- -- -- (!) 47 17 94 %   25 0401 (!) 117/53 -- -- -- 18 --   25 0230 (!) 121/52 97.3 °F (36.3 °C) -- (!) 46 16 93 %   25 0100 -- -- -- (!) 42 17 --   25 0000 -- -- -- (!) 42 15 --   250 -- -- -- (!) 44 16 --   251 107/73 97.9 °F (36.6 °C) Oral (!) 44 18

## 2025-02-17 NOTE — H&P
CM note::    Left VM fro admissions with Ramandeep Mcdaniel at [phone]. CAL MCQUEEN, MSW  [phone]    Per attending - patient not medically ready for discharge today likely tomorrow- per Violeta in admissions they can accept patient back to memory care with jet nebs - will follow-up in am. CAL MCQUEEN, MSW

## 2025-02-17 NOTE — WOUND CARE
Wound care nurse consult for sacral wound POA.    80 y/o confused male admitted for sepsis. Flu +    Past Medical History:   Diagnosis Date    Anxiety     Asthma     Atherosclerotic heart disease of native coronary artery without angina pectoris     COVID-19 01/16/2025    Dementia (HCC)     Depression     Disruptive mood dysregulation disorder (HCC)     Gastroesophageal reflux disease without esophagitis     Hypokalemia     Localized edema     Pneumonia     PTSD (post-traumatic stress disorder)      History reviewed. No pertinent surgical history.  Patient helped to stand with gait belt and 2nd nurse from chair. Patient very weak.    Patient has POA Deep Tissue PI to right sacrum    Left hip partial thickness wound - skin tear?    WOUND POA CONDITIONS    Wound 12/12/24 Buttocks Posterior MASD with denuded skin (Active)   Dressing Status Clean;Dry 02/17/25 0715   Wound Cleansed Soap and water 02/17/25 0715   Dressing/Treatment Pharmaceutical agent (see MAR) 02/17/25 0715   Drainage Amount None (dry) 01/13/25 1930   Odor None 01/13/25 1930   Number of days: 67       Wound 01/13/25 Thigh Left;Posterior;Proximal Tear (Active)   Wound Image   02/17/25 1235   Wound Etiology Traumatic 02/17/25 1235   Dressing Status Clean;Dry;Intact 02/17/25 0715   Wound Cleansed Not Cleansed 02/17/25 0715   Dressing/Treatment Foam 02/17/25 0715   Wound Assessment Pink/red 02/17/25 1235   Drainage Amount Scant (moist but unmeasurable) 02/17/25 1235   Drainage Description Yellow 02/17/25 1235   Number of days: 35       Wound 02/17/25 Sacrum Right (Active)   Wound Image   02/17/25 1237   Wound Etiology Deep tissue/Injury 02/17/25 1237   Wound Length (cm) 1 cm 02/17/25 1237   Wound Width (cm) 2 cm 02/17/25 1237   Wound Surface Area (cm^2) 2 cm^2 02/17/25 1237   Wound Assessment Purple/maroon 02/17/25 1237   Drainage Amount None (dry) 02/17/25 1237   Odor None 02/17/25 1237   Number of days: 0       Recommend:    Right sacral DTPI: Bid,

## 2025-02-17 NOTE — CONSULTS
Palliative Medicine  Patient Name: Josue Cruz  YOB: 1945  MRN: [id number]  Age: 79 y.o.  Gender: male    Date of Initial Consult: 2/17/2025  Date of Service: 2/17/2025  Time: 5:51 PM  Provider: CLEMENTINA Flannery NP  Hospital Day: 4  Admit Date: 2/14/2025  Referring Provider: Yandel Landeros MD       Reasons for Consultation:  Goals of Care, Overwhelming Symptoms, Psychosocial Distress, and End Stage Disease    HISTORY OF PRESENT ILLNESS (HPI):   Josue Cruz is a 79 y.o. male [place] (100% service connected from [facility] with [facility]) with a past medical history of hypotension, Dementia, CAD and moderate Aortic stenosis, who was admitted on 2/14/2025 from [facility] with a diagnosis of influenza A, sepsis.   BIBA from LT with flu like symptoms, fever and cough, hypotension 88/41.    2/17: pt sitting on edge of bed, agitated, thinks RNs are family members trying to kill him, trying to steal from him.  He is not re-directable.     Psychosocial: resides at [facility].  Dependent with ADL, wheelchair bound and requires support for transfers from bed->.  PALLIATIVE DIAGNOSES:    Cough   Fever   Influenza A  Sepsis  Chronic hypotension   Dementia   Urinary retention   Stool incontinence   Dementia   Agitation   PTSD  Goals of care    ASSESSMENT AND PLAN:   Today, I am treating Josue Cruz for goals of care.  Prior to visit, I completed an extensive review of patient's medical records, including medical documentation, vital signs, MARs, and results of various labs and other diagnostics.   Spoke with wife and dtr Mariana: discussed pt's dementia and PTSD, how they are affecting pt's care and QOL.  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   Family reports wife has not been able to see pt [place]

## 2025-02-17 NOTE — PLAN OF CARE
Predictive Model Details          47 (Caution)  Factor Value    Calculated 2/17/2025 13:22 27% Age 79 years old    Deterioration Index Model 20% Neurological exam X     17% Respiratory rate 22     14% [place] coma scale 14     9% Cardiac rhythm Sinus lizz     2% Pulse oximetry 93 %     2% BUN abnormal (21 MG/DL)     2% Hematocrit abnormal (30.9 %)     2% Potassium abnormal (3.4 mmol/L)     2% Sodium 142 mmol/L     1% Systolic 119     1% WBC count 8.8 K/uL     1% Pulse 60     0% Temperature 97.5 °F (36.4 °C)        Problem: Discharge Planning  Goal: Discharge to home or other facility with appropriate resources  Outcome: Progressing  Flowsheets (Taken 2/17/2025 0715)  Discharge to home or other facility with appropriate resources:   Identify barriers to discharge with patient and caregiver   Arrange for needed discharge resources and transportation as appropriate     Problem: Safety - Adult  Goal: Free from fall injury  Outcome: Progressing     Problem: Pain  Goal: Verbalizes/displays adequate comfort level or baseline comfort level  Outcome: Progressing     Problem: Skin/Tissue Integrity  Goal: Skin integrity remains intact  Description: 1.  Monitor for areas of redness and/or skin breakdown  2.  Assess vascular access sites hourly  3.  Every 4-6 hours minimum:  Change oxygen saturation probe site  4.  Every 4-6 hours:  If on nasal continuous positive airway pressure, respiratory therapy assess nares and determine need for appliance change or resting period  Outcome: Progressing  Flowsheets (Taken 2/17/2025 0715)  Skin Integrity Remains Intact:   Monitor for areas of redness and/or skin breakdown   Assess vascular access sites hourly

## 2025-02-17 NOTE — PROGRESS NOTES
1647 Patient remains agitated at this time. Hyper-verbal and making verbal threats intermittently. Hospitalist notified by rapid response nurse. Verbal order received for Geodon. IM Geodon administered per order. Writer remains at bedside with patient as he continues to climb OOB and refuses to return to supine position. Will re-assess X1 hour.    1724 Patient sitting EOB, naked. Refusing to allow writer to put his hospital gown on. Continues to be conversant with himself, however, yelling at RN when attempts are made to help him. Patient has pulled out multiple PIV's today. Currently without IV access.      1800 Verbal order received for additional 10mg IM Geodon. Patient continues to be hyperverbal and conversant with himself. Yelling at times. Remains sitting EOB, naked. Refuses to allow writer to help him do anything. However, writer is able to get patient to take PO meds. 1810 IM Geodon administered at this time.    1830 RN able to get patient to lie down at this time. Patient lying in bed, turned on left side with pillow support. Bed alarm turned on. Patient started to fall asleep at this time.

## 2025-02-18 PROBLEM — Z51.5 PALLIATIVE CARE BY SPECIALIST: Status: ACTIVE | Noted: 2025-02-18

## 2025-02-18 PROBLEM — F03.911 AGITATION DUE TO DEMENTIA (HCC): Status: ACTIVE | Noted: 2025-02-18

## 2025-02-18 PROCEDURE — 6370000000 HC RX 637 (ALT 250 FOR IP): Performed by: INTERNAL MEDICINE

## 2025-02-18 PROCEDURE — 2700000000 HC OXYGEN THERAPY PER DAY

## 2025-02-18 PROCEDURE — 94640 AIRWAY INHALATION TREATMENT: CPT

## 2025-02-18 PROCEDURE — 2580000003 HC RX 258: Performed by: INTERNAL MEDICINE

## 2025-02-18 PROCEDURE — 2060000000 HC ICU INTERMEDIATE R&B

## 2025-02-18 PROCEDURE — 6360000002 HC RX W HCPCS: Performed by: INTERNAL MEDICINE

## 2025-02-18 PROCEDURE — 2500000003 HC RX 250 WO HCPCS: Performed by: INTERNAL MEDICINE

## 2025-02-18 RX ORDER — OLANZAPINE 5 MG/1
5 TABLET ORAL 2 TIMES DAILY
Status: DISCONTINUED | OUTPATIENT
Start: 2025-02-18 | End: 2025-02-19

## 2025-02-18 RX ORDER — ZIPRASIDONE MESYLATE 20 MG/ML
10 INJECTION, POWDER, LYOPHILIZED, FOR SOLUTION INTRAMUSCULAR EVERY 6 HOURS PRN
Status: DISCONTINUED | OUTPATIENT
Start: 2025-02-18 | End: 2025-02-22 | Stop reason: HOSPADM

## 2025-02-18 RX ORDER — SERTRALINE HYDROCHLORIDE 25 MG/1
25 TABLET, FILM COATED ORAL DAILY
Status: DISCONTINUED | OUTPATIENT
Start: 2025-02-18 | End: 2025-02-22 | Stop reason: HOSPADM

## 2025-02-18 RX ORDER — HYDROCORTISONE 10 MG/1
20 TABLET ORAL 2 TIMES DAILY
Status: DISCONTINUED | OUTPATIENT
Start: 2025-02-18 | End: 2025-02-18

## 2025-02-18 RX ADMIN — GUAIFENESIN 300 MG: 200 SOLUTION ORAL at 17:28

## 2025-02-18 RX ADMIN — MIDODRINE HYDROCHLORIDE 10 MG: 5 TABLET ORAL at 09:31

## 2025-02-18 RX ADMIN — OLANZAPINE 5 MG: 5 TABLET, FILM COATED ORAL at 09:32

## 2025-02-18 RX ADMIN — BUSPIRONE HYDROCHLORIDE 15 MG: 10 TABLET ORAL at 20:43

## 2025-02-18 RX ADMIN — SODIUM CHLORIDE, PRESERVATIVE FREE 10 ML: 5 INJECTION INTRAVENOUS at 20:47

## 2025-02-18 RX ADMIN — Medication 3 MG: at 20:42

## 2025-02-18 RX ADMIN — Medication: at 11:17

## 2025-02-18 RX ADMIN — WATER 40 MG: 1 INJECTION INTRAMUSCULAR; INTRAVENOUS; SUBCUTANEOUS at 17:33

## 2025-02-18 RX ADMIN — ZIPRASIDONE MESYLATE 10 MG: 20 INJECTION, POWDER, LYOPHILIZED, FOR SOLUTION INTRAMUSCULAR at 16:53

## 2025-02-18 RX ADMIN — THERA TABS 1 TABLET: TAB at 09:32

## 2025-02-18 RX ADMIN — DIVALPROEX SODIUM 125 MG: 125 CAPSULE ORAL at 09:32

## 2025-02-18 RX ADMIN — BUSPIRONE HYDROCHLORIDE 15 MG: 10 TABLET ORAL at 09:32

## 2025-02-18 RX ADMIN — MIDODRINE HYDROCHLORIDE 10 MG: 5 TABLET ORAL at 13:18

## 2025-02-18 RX ADMIN — PANTOPRAZOLE SODIUM 40 MG: 40 TABLET, DELAYED RELEASE ORAL at 09:30

## 2025-02-18 RX ADMIN — SERTRALINE HYDROCHLORIDE 25 MG: 25 TABLET ORAL at 09:32

## 2025-02-18 RX ADMIN — CALCIUM CARBONATE (ANTACID) CHEW TAB 500 MG 500 MG: 500 CHEW TAB at 20:47

## 2025-02-18 RX ADMIN — IPRATROPIUM BROMIDE AND ALBUTEROL SULFATE 1 DOSE: 2.5; .5 SOLUTION RESPIRATORY (INHALATION) at 19:50

## 2025-02-18 RX ADMIN — OSELTAMIVIR PHOSPHATE 75 MG: 75 CAPSULE ORAL at 20:45

## 2025-02-18 RX ADMIN — CALCIUM CARBONATE (ANTACID) CHEW TAB 500 MG 500 MG: 500 CHEW TAB at 09:32

## 2025-02-18 RX ADMIN — OSELTAMIVIR PHOSPHATE 75 MG: 75 CAPSULE ORAL at 09:32

## 2025-02-18 RX ADMIN — WATER 40 MG: 1 INJECTION INTRAMUSCULAR; INTRAVENOUS; SUBCUTANEOUS at 11:14

## 2025-02-18 RX ADMIN — OLANZAPINE 5 MG: 5 TABLET, FILM COATED ORAL at 20:41

## 2025-02-18 RX ADMIN — BUDESONIDE 500 MCG: 0.5 INHALANT RESPIRATORY (INHALATION) at 19:50

## 2025-02-18 RX ADMIN — ENOXAPARIN SODIUM 40 MG: 100 INJECTION SUBCUTANEOUS at 09:35

## 2025-02-18 RX ADMIN — PIPERACILLIN AND TAZOBACTAM 3375 MG: 3; .375 INJECTION, POWDER, LYOPHILIZED, FOR SOLUTION INTRAVENOUS at 21:59

## 2025-02-18 RX ADMIN — ASPIRIN 81 MG: 81 TABLET, COATED ORAL at 09:32

## 2025-02-18 RX ADMIN — DIVALPROEX SODIUM 125 MG: 125 CAPSULE ORAL at 20:43

## 2025-02-18 RX ADMIN — HYDROCORTISONE 20 MG: 10 TABLET ORAL at 09:32

## 2025-02-18 RX ADMIN — IPRATROPIUM BROMIDE AND ALBUTEROL SULFATE 1 DOSE: 2.5; .5 SOLUTION RESPIRATORY (INHALATION) at 11:40

## 2025-02-18 RX ADMIN — Medication: at 20:47

## 2025-02-18 RX ADMIN — MIDODRINE HYDROCHLORIDE 10 MG: 5 TABLET ORAL at 17:30

## 2025-02-18 RX ADMIN — ZINC SULFATE 220 MG (50 MG) CAPSULE 50 MG: CAPSULE at 09:32

## 2025-02-18 RX ADMIN — IPRATROPIUM BROMIDE AND ALBUTEROL SULFATE 1 DOSE: 2.5; .5 SOLUTION RESPIRATORY (INHALATION) at 15:45

## 2025-02-18 RX ADMIN — POTASSIUM CHLORIDE 20 MEQ: 750 TABLET, EXTENDED RELEASE ORAL at 09:32

## 2025-02-18 RX ADMIN — PIPERACILLIN AND TAZOBACTAM 3375 MG: 3; .375 INJECTION, POWDER, LYOPHILIZED, FOR SOLUTION INTRAVENOUS at 14:30

## 2025-02-18 NOTE — PROGRESS NOTES
0711 Bedside and Verbal shift change report given to CARMITA Beauchamp and CARMITA Haro (oncoming nurse) by CARMITA Amaya (offgoing nurse). Report included the following information Nurse Handoff Report.     1020 Dr. Landeros at bedside, pt with audible wheezing. New order for Solumedrol 40mg q8h.     End of Shift Note    Bedside shift change report given to CARMITA Amaya (oncoming nurse) by Quynh Marion RN (offgoing nurse).  Report included the following information SBAR    Shift worked:  9147-2346     Shift summary and any significant changes:     Pt with infrequent wheezing. SpO2 remains in 90s on RA. 1x dose Geodon and 1x dose Robitussin administered this shift. Q2 turns. External male catheter applied.       Concerns for physician to address:  See above     Zone phone for oncoming shift:          Activity:  Level of Assistance: Moderate assist, patient does 50-74%  Number times ambulated in hallways past shift: 0  Number of times OOB to chair past shift: 0    Cardiac:   Cardiac Monitoring: Yes      Cardiac Rhythm: Sinus lizz    Access:  Current line(s): PIV     Genitourinary:   Urinary Status: Voiding    Respiratory:   O2 Device: None (Room air)  Chronic home O2 use?: NO  Incentive spirometer at bedside: YES    GI:  Last BM (including prior to admit): 02/18/25  Current diet:  ADULT DIET; Regular  Passing flatus: YES    Pain Management:   Patient states pain is manageable on current regimen: YES    Skin:  Erick Scale Score: 16  Interventions: Wound Offloading (Prevention Methods): Turning, Pillows, Repositioning, Elevate heels    Patient Safety:  Fall Risk: Nursing Judgement-Fall Risk High(Add Comments): Yes  Fall Risk Interventions  Nursing Judgement-Fall Risk High(Add Comments): Yes  Toilet Every 2 Hours-In Advance of Need: Yes  Hourly Visual Checks: Eyes closed, In bed  Fall Visual Posted: Socks  Room Door Open: Yes  Alarm On: Bed  Patient Moved Closer to Nursing Station: No    Active Consults:   IP CONSULT

## 2025-02-18 NOTE — PROGRESS NOTES
End of Shift Note    Bedside shift change report given to CARMITA Haro/CARMITA Beauchamp (oncoming nurse) by Monique Lopez RN (offgoing nurse).  Report included the following information SBAR, Kardex, Procedure Summary, Intake/Output, MAR, and Recent Results    Shift worked:  5254-7139     Shift summary and any significant changes:    Pt refused meds overnight and had no IV access;      Concerns for physician to address:       Zone phone for oncoming shift:          Activity:  Level of Assistance: Moderate assist, patient does 50-74%  Number times ambulated in hallways past shift: 0  Number of times OOB to chair past shift: 0    Cardiac:   Cardiac Monitoring: Yes      Cardiac Rhythm: Sinus lizz    Access:  Current line(s): Other no access    Genitourinary:   Urinary Status: Voiding    Respiratory:   O2 Device: None (Room air)  Chronic home O2 use?: NO  Incentive spirometer at bedside: NO    GI:  Last BM (including prior to admit): 02/16/25  Current diet:  ADULT DIET; Regular  Passing flatus: YES    Pain Management:   Patient states pain is manageable on current regimen: YES    Skin:  Erick Scale Score: 16  Interventions: Wound Offloading (Prevention Methods): Bed, pressure redistribution/air, Bed, pressure reduction mattress, Elevate heels, Pillows, Repositioning, Turning    Patient Safety:  Fall Risk: Nursing Judgement-Fall Risk High(Add Comments): Yes  Fall Risk Interventions  Nursing Judgement-Fall Risk High(Add Comments): Yes  Toilet Every 2 Hours-In Advance of Need: Yes  Hourly Visual Checks: Awake, In bed  Fall Visual Posted: Socks  Room Door Open: Yes  Alarm On: Bed  Patient Moved Closer to Nursing Station: No    Active Consults:   IP CONSULT TO HOSPITALIST  IP CONSULT TO PHARMACY  IP CONSULT TO PALLIATIVE CARE    Length of Stay:  Expected LOS: 4  Actual LOS: 4    Monique Lopez RN

## 2025-02-18 NOTE — PLAN OF CARE
Predictive Model Details          39 (Caution)  Factor Value    Calculated 2/18/2025 07:53 31% Age 79 years old    Deterioration Index Model 23% Neurological exam X     17% [place] coma scale 14     10% Cardiac rhythm Sinus lizz     6% Respiratory rate 18     2% BUN abnormal (21 MG/DL)     2% Systolic 125     2% Hematocrit abnormal (30.9 %)     2% Potassium abnormal (3.4 mmol/L)     2% Sodium 142 mmol/L     2% Pulse 59     1% WBC count 8.8 K/uL     0% Temperature 97.8 °F (36.6 °C)     0% Pulse oximetry 95 %        Problem: Discharge Planning  Goal: Discharge to home or other facility with appropriate resources  Outcome: Progressing     Problem: Safety - Adult  Goal: Free from fall injury  Outcome: Progressing     Problem: Pain  Goal: Verbalizes/displays adequate comfort level or baseline comfort level  Outcome: Progressing  Flowsheets (Taken 2/18/2025 0725 by Quynh Marion, RN)  Verbalizes/displays adequate comfort level or baseline comfort level:   Encourage patient to monitor pain and request assistance   Assess pain using appropriate pain scale     Problem: Skin/Tissue Integrity  Goal: Skin integrity remains intact  Description: 1.  Monitor for areas of redness and/or skin breakdown  2.  Assess vascular access sites hourly  3.  Every 4-6 hours minimum:  Change oxygen saturation probe site  4.  Every 4-6 hours:  If on nasal continuous positive airway pressure, respiratory therapy assess nares and determine need for appliance change or resting period  Outcome: Progressing  Flowsheets (Taken 2/17/2025 1922 by Monique Lopez, RN)  Skin Integrity Remains Intact: Monitor for areas of redness and/or skin breakdown     Problem: Respiratory - Adult  Goal: Achieves optimal ventilation and oxygenation  2/18/2025 0753 by Estrellita Mcgraw, RN  Outcome: Progressing  2/17/2025 1954 by Joey Sousa RCP  Outcome: Progressing  Flowsheets (Taken 2/17/2025 0715 by Quynh Marion, RN)  Achieves optimal ventilation and

## 2025-02-18 NOTE — PLAN OF CARE
Problem: Respiratory - Adult  Goal: Achieves optimal ventilation and oxygenation  Outcome: Progressing  Flowsheets (Taken 2/17/2025 0715 by Quynh Marion RN)  Achieves optimal ventilation and oxygenation:   Assess for changes in mentation and behavior   Assess for changes in respiratory status

## 2025-02-18 NOTE — CARE COORDINATION
Chart reviewed. Patient discussed in rounds as possible discharge. Asked to set up transport to return to [facility] and [facility].    CM sent message to attending, after speaking with nurse. Confirmed that patient having breathing difficulty and not ready for discharge. Physician mentioned wife not willing to consider hospice, at this time.     CM spoke with Violeta, at [facility], [phone]. They would accept back as hospice. There are specific hospice companies that they use at that facility. CM will wait for hospice eval orders or patient medically stable to return.    CM will follow up.    Madai Contreras, RN  Care Manager  [id number]

## 2025-02-18 NOTE — PROGRESS NOTES
Physician Progress Note      PATIENT:               JULIO TRACEY  [facility] #:                  [id number]  :                       1945  ADMIT DATE:       2025 8:50 AM  DISCH DATE:  RESPONDING  PROVIDER #:        Yandel Landeros MD          QUERY TEXT:    Patient admitted with Influenza. Noted documentation of sepsis in H&P with   temp max 100.7 and WBC 5.9 to 9.5. In order to support the diagnosis of   sepsis, please include additional clinical indicators in your documentation.    Or please document if the diagnosis of sepsis has been ruled out after further   study    The medical record reflects the following:  Risk Factors: Influenza with chronic hypotension  Clinical Indicators: Temp 97.3 to 100.7.  WBC 5.9 - 9.5  Treatment:  2,670 ml NS bolus () Rocephin IV x1 (), Zosyn IV q8h, IV   Vanc q12h. \"MRSA probe negative. Vanco stopped\"  Dr Landeros  Options provided:  -- Sepsis present as evidenced by, Please document evidence.  -- Sepsis was ruled out after study  -- Other - I will add my own diagnosis  -- Disagree - Not applicable / Not valid  -- Disagree - Clinically unable to determine / Unknown  -- Refer to Clinical Documentation Reviewer    PROVIDER RESPONSE TEXT:    Sepsis is present as evidenced by low BP, pulse ,flu    Query created by: MILANA CORTES on 2025 7:09 AM      QUERY TEXT:    Patient admitted with Influenza. Noted documentation of UTI on  Dr Landeros note. In order to support the diagnosis of UTI, please include   additional clinical indicators in your documentation.  Or please document if   the diagnosis of UTI has been ruled out after further study.    The medical record reflects the following:  Risk Factors: 79-year-old male with dementia and urinary retention    Clinical Indicators:  UA 25: Large blood, Neg Nitrites, Moderate leukocytes,  WBC, 3+   bacteria  urine culture 25: >2 organisms - likely contaminated specimen    Treatment: UA,

## 2025-02-18 NOTE — PROGRESS NOTES
Hospitalist Progress Note    NAME:   Josue Cruz   : 1945   MRN: [id number]     Date/Time: 2025 7:51 AM  Patient PCP: No primary care provider on file.    Estimated discharge date: 25  Barriers: treatimg FLU      Assessment / Plan:  Principal Problem:    Sepsis (HCC)  Active Problems:    Hypotension    Influenza A    Dementia (HCC)    UTI (urinary tract infection)    Adrenal insufficiency (HCC)    DNR (do not resuscitate)    Moderate aortic stenosis by prior echocardiogram    Hyperkalemia    Plan:  2/15   Cont current Abx plus Tamiflu  Cont stress dose Hydrocortisone  Stop IVF sounding too wet      Sounds great  Confused as is his baseline  MRSA probe negative Vanco stopped  Taper to oral hydrocortisone      Wheezing again so component of copd/asthma  [id number]  Pulmicort neb BID  Cont Abx  Pleasant confusion  Replace K      Decrease Zoloft can worsen Agitation  Have to stop hydrocortisone and go back to IV solumedrol d/t wheezing  Increase Zyprexa  PRN Geodon  Consider Hospice                Medical Decision Making:   I personally reviewed labs:yes as above  I personally reviewed imaging:NA  I personally reviewed EKG:NA  Toxic drug monitoring: Zosyn   Discussed case with: RN        Code Status: DNR  DVT Prophylaxis: Lovenox  GI Prophylaxis:PPI    Subjective:     Chief Complaint / Reason for Physician Visit  Patient  awake and alert and now making some conversation pleasant but again very short of breath and   wheezy    Objective:     VITALS:   Last 24hrs VS reviewed since prior progress note. Most recent are:  Patient Vitals for the past 24 hrs:   BP Temp Temp src Pulse Resp SpO2   25 0725 125/62 97.8 °F (36.6 °C) Oral 59 18 95 %   25 0330 99/60 97.4 °F (36.3 °C) Axillary 51 16 --   25 0106 -- -- -- -- -- 94 %   25 2255 104/61 -- -- 59 18 --   25 2254 (!) 91/53 97.3 °F (36.3 °C) -- 59 15 --   25 1922 (!) 94/59 97.5 °F (36.4 °C) Oral 59 18 94

## 2025-02-19 PROCEDURE — 94640 AIRWAY INHALATION TREATMENT: CPT

## 2025-02-19 PROCEDURE — 6370000000 HC RX 637 (ALT 250 FOR IP): Performed by: INTERNAL MEDICINE

## 2025-02-19 PROCEDURE — 6360000002 HC RX W HCPCS: Performed by: INTERNAL MEDICINE

## 2025-02-19 PROCEDURE — 2580000003 HC RX 258: Performed by: INTERNAL MEDICINE

## 2025-02-19 PROCEDURE — 2060000000 HC ICU INTERMEDIATE R&B

## 2025-02-19 PROCEDURE — 2500000003 HC RX 250 WO HCPCS: Performed by: INTERNAL MEDICINE

## 2025-02-19 RX ORDER — OLANZAPINE 5 MG/1
2.5 TABLET ORAL 2 TIMES DAILY
Status: DISCONTINUED | OUTPATIENT
Start: 2025-02-19 | End: 2025-02-22 | Stop reason: HOSPADM

## 2025-02-19 RX ADMIN — ASPIRIN 81 MG: 81 TABLET, COATED ORAL at 10:01

## 2025-02-19 RX ADMIN — MIDODRINE HYDROCHLORIDE 10 MG: 5 TABLET ORAL at 10:01

## 2025-02-19 RX ADMIN — IPRATROPIUM BROMIDE AND ALBUTEROL SULFATE 1 DOSE: 2.5; .5 SOLUTION RESPIRATORY (INHALATION) at 11:07

## 2025-02-19 RX ADMIN — SERTRALINE HYDROCHLORIDE 25 MG: 25 TABLET ORAL at 10:01

## 2025-02-19 RX ADMIN — WATER 40 MG: 1 INJECTION INTRAMUSCULAR; INTRAVENOUS; SUBCUTANEOUS at 17:23

## 2025-02-19 RX ADMIN — MIDODRINE HYDROCHLORIDE 10 MG: 5 TABLET ORAL at 17:23

## 2025-02-19 RX ADMIN — HYDROXYZINE HYDROCHLORIDE 25 MG: 25 TABLET, FILM COATED ORAL at 18:59

## 2025-02-19 RX ADMIN — DIVALPROEX SODIUM 125 MG: 125 CAPSULE ORAL at 10:01

## 2025-02-19 RX ADMIN — PANTOPRAZOLE SODIUM 40 MG: 40 TABLET, DELAYED RELEASE ORAL at 05:33

## 2025-02-19 RX ADMIN — IPRATROPIUM BROMIDE AND ALBUTEROL SULFATE 1 DOSE: 2.5; .5 SOLUTION RESPIRATORY (INHALATION) at 19:55

## 2025-02-19 RX ADMIN — MIDODRINE HYDROCHLORIDE 10 MG: 5 TABLET ORAL at 13:56

## 2025-02-19 RX ADMIN — BUDESONIDE 500 MCG: 0.5 INHALANT RESPIRATORY (INHALATION) at 07:22

## 2025-02-19 RX ADMIN — OLANZAPINE 2.5 MG: 5 TABLET, FILM COATED ORAL at 23:20

## 2025-02-19 RX ADMIN — IPRATROPIUM BROMIDE AND ALBUTEROL SULFATE 1 DOSE: 2.5; .5 SOLUTION RESPIRATORY (INHALATION) at 07:14

## 2025-02-19 RX ADMIN — Medication: at 23:25

## 2025-02-19 RX ADMIN — PIPERACILLIN AND TAZOBACTAM 3375 MG: 3; .375 INJECTION, POWDER, LYOPHILIZED, FOR SOLUTION INTRAVENOUS at 05:33

## 2025-02-19 RX ADMIN — Medication: at 10:02

## 2025-02-19 RX ADMIN — Medication 3 MG: at 23:20

## 2025-02-19 RX ADMIN — THERA TABS 1 TABLET: TAB at 10:01

## 2025-02-19 RX ADMIN — WATER 40 MG: 1 INJECTION INTRAMUSCULAR; INTRAVENOUS; SUBCUTANEOUS at 10:01

## 2025-02-19 RX ADMIN — BUSPIRONE HYDROCHLORIDE 15 MG: 10 TABLET ORAL at 10:00

## 2025-02-19 RX ADMIN — SODIUM CHLORIDE, PRESERVATIVE FREE 10 ML: 5 INJECTION INTRAVENOUS at 23:25

## 2025-02-19 RX ADMIN — PIPERACILLIN AND TAZOBACTAM 3375 MG: 3; .375 INJECTION, POWDER, LYOPHILIZED, FOR SOLUTION INTRAVENOUS at 23:19

## 2025-02-19 RX ADMIN — CALCIUM CARBONATE (ANTACID) CHEW TAB 500 MG 500 MG: 500 CHEW TAB at 10:01

## 2025-02-19 RX ADMIN — DIVALPROEX SODIUM 125 MG: 125 CAPSULE ORAL at 23:20

## 2025-02-19 RX ADMIN — OLANZAPINE 5 MG: 5 TABLET, FILM COATED ORAL at 10:01

## 2025-02-19 RX ADMIN — ENOXAPARIN SODIUM 40 MG: 100 INJECTION SUBCUTANEOUS at 10:00

## 2025-02-19 RX ADMIN — PIPERACILLIN AND TAZOBACTAM 3375 MG: 3; .375 INJECTION, POWDER, LYOPHILIZED, FOR SOLUTION INTRAVENOUS at 13:57

## 2025-02-19 RX ADMIN — BUDESONIDE 500 MCG: 0.5 INHALANT RESPIRATORY (INHALATION) at 19:55

## 2025-02-19 RX ADMIN — WATER 40 MG: 1 INJECTION INTRAMUSCULAR; INTRAVENOUS; SUBCUTANEOUS at 02:44

## 2025-02-19 RX ADMIN — CALCIUM CARBONATE (ANTACID) CHEW TAB 500 MG 500 MG: 500 CHEW TAB at 23:19

## 2025-02-19 RX ADMIN — IPRATROPIUM BROMIDE AND ALBUTEROL SULFATE 1 DOSE: 2.5; .5 SOLUTION RESPIRATORY (INHALATION) at 16:30

## 2025-02-19 RX ADMIN — BUSPIRONE HYDROCHLORIDE 15 MG: 10 TABLET ORAL at 23:20

## 2025-02-19 RX ADMIN — ZINC SULFATE 220 MG (50 MG) CAPSULE 50 MG: CAPSULE at 10:02

## 2025-02-19 NOTE — PROGRESS NOTES
End of Shift Note    Bedside shift change report given to CARMITA Seo (oncoming nurse) by Monique Lopez RN (offgoing nurse).  Report included the following information SBAR, Kardex, Intake/Output, MAR, and Recent Results    Shift worked:  night     Shift summary and any significant changes:    Pt still confused; took meds with no problem tonight; only tried to get out of bed 1x overnight      Concerns for physician to address:    Zone phone for oncoming shift:          Activity:  Level of Assistance: Moderate assist, patient does 50-74%  Number times ambulated in hallways past shift: 0  Number of times OOB to chair past shift: 0    Cardiac:   Cardiac Monitoring: Yes      Cardiac Rhythm: Sinus rhythm    Access:  Current line(s): PIV     Genitourinary:   Urinary Status: Voiding    Respiratory:   O2 Device: None (Room air)  Chronic home O2 use?: NO  Incentive spirometer at bedside: NO    GI:  Last BM (including prior to admit): 02/18/25  Current diet:  ADULT DIET; Regular  Passing flatus: YES    Pain Management:   Patient states pain is manageable on current regimen: YES    Skin:  Erick Scale Score: 16  Interventions: Wound Offloading (Prevention Methods): Pillows, Repositioning, Turning    Patient Safety:  Fall Risk: Nursing Judgement-Fall Risk High(Add Comments): Yes  Fall Risk Interventions  Nursing Judgement-Fall Risk High(Add Comments): Yes  Toilet Every 2 Hours-In Advance of Need: Yes  Hourly Visual Checks: Confused, In bed  Fall Visual Posted: Socks  Room Door Open: Yes  Alarm On: Bed  Patient Moved Closer to Nursing Station: No    Active Consults:   IP CONSULT TO HOSPITALIST  IP CONSULT TO PHARMACY  IP CONSULT TO PALLIATIVE CARE    Length of Stay:  Expected LOS: 5  Actual LOS: 5    Monique Lopez RN

## 2025-02-19 NOTE — PLAN OF CARE
Predictive Model Details          36 (Caution)  Factor Value    Calculated 2/18/2025 19:11 34% Age 79 years old    Deterioration Index Model 25% Neurological exam X     15% Respiratory rate 20     11% Cardiac rhythm Sinus lizz     4% Systolic 104     3% BUN abnormal (21 MG/DL)     2% Hematocrit abnormal (30.9 %)     2% Potassium abnormal (3.4 mmol/L)     2% Sodium 142 mmol/L     2% Pulse 58     2% WBC count 8.8 K/uL     0% Pulse oximetry 95 %     0% Temperature 98.4 °F (36.9 °C)        Problem: Discharge Planning  Goal: Discharge to home or other facility with appropriate resources  2/18/2025 1911 by Estrellita Mcgraw RN  Outcome: Progressing  2/18/2025 0753 by Estrellita Mcgraw RN  Outcome: Progressing  Flowsheets (Taken 2/18/2025 0725 by Quynh Marion RN)  Discharge to home or other facility with appropriate resources:   Identify barriers to discharge with patient and caregiver   Arrange for needed discharge resources and transportation as appropriate     Problem: Safety - Adult  Goal: Free from fall injury  2/18/2025 1911 by Estrellita Mcgraw RN  Outcome: Progressing  2/18/2025 0753 by Estrellita Mcgraw RN  Outcome: Progressing     Problem: Pain  Goal: Verbalizes/displays adequate comfort level or baseline comfort level  2/18/2025 1911 by Estrelliat Mcgraw RN  Outcome: Progressing  Flowsheets  Taken 2/18/2025 1400 by Quynh Marion RN  Verbalizes/displays adequate comfort level or baseline comfort level:   Encourage patient to monitor pain and request assistance   Assess pain using appropriate pain scale  Taken 2/18/2025 1043 by Quynh Marion, RN  Verbalizes/displays adequate comfort level or baseline comfort level: Encourage patient to monitor pain and request assistance  2/18/2025 0753 by Estrellita Mcgraw RN  Outcome: Progressing  Flowsheets (Taken 2/18/2025 0725 by Quynh Marion, RN)  Verbalizes/displays adequate comfort level or baseline comfort level:   Encourage patient to monitor pain and

## 2025-02-19 NOTE — PROGRESS NOTES
Spiritual Health History and Assessment/Progress Note  [facility]    Attempted Encounter,  ,  ,      Name: Josue Cruz MRN: [id number]    Age: 79 y.o.     Sex: male   Language: English   Sabianism: Other   Sepsis (HCC)     Date: 2/19/2025            Total Time Calculated: 10 min              Spiritual Assessment began in [facility] 2 PROGRESSIVE CARE        Referral/Consult From: Rounding   Encounter Overview/Reason: Attempted Encounter  Service Provided For: Patient not available    Jessica, Belief, Meaning:   Patient unable to assess at this time  Family/Friends No family/friends present      Importance and Influence:  Patient unable to assess at this time  Family/Friends No family/friends present    Community:  Patient Other: Unable to assess  Family/Friends No family/friends present    Assessment and Plan of Care:     Patient Interventions include: Other: Unable to assess  Family/Friends Interventions include: No family/friends present    Patient Plan of Care: Spiritual Care available upon further referral  Family/Friends Plan of Care: Spiritual Care available upon further referral    Electronically signed by ANT Newton on 2/19/2025 at 11:59 AM     Rev. PAULO Newton BCC  [facility]   Paging Service [phone])

## 2025-02-19 NOTE — PROGRESS NOTES
ADULT PROTOCOL: JET AEROSOL ASSESSMENT    Patient  Josue Cruz     79 y.o.   male     2/19/2025  7:41 AM    Breath Sounds Pre Procedure: Breath Sounds Pre-Tx LORENE: Expiratory wheezes                                  Breath Sounds Pre-Tx LLL: Expiratory wheezes        Breath Sounds Pre-Tx RUL: Expiratory wheezes        Breath Sounds Pre-Tx RML: Expiratory wheezes        Breath Sounds Pre-Tx RLL: Expiratory wheezes  Breath Sounds Post Procedure: Breath Sounds Post-Tx LORENE: Expiratory wheezes          Breath Sounds Post-Tx LLL: Expiratory wheezes          Breath Sounds Post-Tx RUL: Expiratory wheezes          Breath Sounds Post-Tx RML: Expiratory wheezes          Breath Sounds Post-Tx RLL: Expiratory wheezes                                         Heart Rate: Pre procedure Pre-Tx Pulse: 54           Post procedure Post-Tx Pulse: 63    Resp Rate: Pre procedure Pre-Tx Resps: 20           Post procedure Post-Tx Resps: 17      Oxygen: O2 Therapy: Room air        Changed: No    SpO2:  SpO2: 95 %   without Oxygen                Nebulizer Therapy: Current medications Medications: Budesonide      Changed: No        Problem List:   Patient Active Problem List   Diagnosis    Hypotension    Sepsis (HCC)    Influenza A    Moderate aortic stenosis by prior echocardiogram    Dementia (HCC)    UTI (urinary tract infection)    Adrenal insufficiency    DNR (do not resuscitate)    Palliative care by specialist    Agitation due to dementia ([facility])       Respiratory Therapist: Kimberli Acevedo RT

## 2025-02-19 NOTE — PROGRESS NOTES
Hospitalist Progress Note    NAME:   Josue Cruz   : 1945   MRN: [id number]     Date/Time: 2025 3:55 PM  Patient PCP: No primary care provider on file.    Estimated discharge date: 25  Barriers: treatimg FLU      Assessment / Plan:  Principal Problem:    Sepsis (HCC)  Active Problems:    Hypotension    Influenza A    Dementia (HCC)    UTI (urinary tract infection)    Adrenal insufficiency (HCC)    DNR (do not resuscitate)    Moderate aortic stenosis by prior echocardiogram    Hyperkalemia    Plan:  2/15   Cont current Abx plus Tamiflu  Cont stress dose Hydrocortisone  Stop IVF sounding too wet      Sounds great  Confused as is his baseline  MRSA probe negative Vanco stopped  Taper to oral hydrocortisone      Wheezing again so component of copd/asthma  [id number]  Pulmicort neb BID  Cont Abx  Pleasant confusion  Replace K      Decrease Zoloft can worsen Agitation  Have to stop hydrocortisone and go back to IV solumedrol d/t wheezing  Increase Zyprexa  PRN Geodon  Consider Hospice      Patient not agitated but now sedated  Decrease zyorexa dose  D/w Palliative care                Medical Decision Making:   I personally reviewed labs:yes as above  I personally reviewed imaging:NA  I personally reviewed EKG:NA  Toxic drug monitoring: Zosyn   Discussed case with: RN        Code Status: DNR  DVT Prophylaxis: Lovenox  GI Prophylaxis:PPI    Subjective:     Chief Complaint / Reason for Physician Visit  Patient  now very sedated  Objective:     VITALS:   Last 24hrs VS reviewed since prior progress note. Most recent are:  Patient Vitals for the past 24 hrs:   BP Temp Temp src Pulse Resp SpO2   25 1115 (!) 87/53 -- -- -- -- --   25 1107 -- -- -- 76 16 95 %   25 1040 -- 98.2 °F (36.8 °C) Oral -- -- 95 %   25 1006 (!) 123/58 -- -- -- -- --   25 0800 -- -- -- -- -- 94 %   25 0714 -- -- -- 54 20 95 %   25 0319 (!) 123/53 97.6 °F (36.4 °C) Oral 59 14

## 2025-02-19 NOTE — PROGRESS NOTES
Physician Progress Note      PATIENT:               JULIO TRACEY  CSN #:                  [id number]  :                       1945  ADMIT DATE:       2025 8:50 AM  DISCH DATE:  RESPONDING  PROVIDER #:        Yandel Landeros MD          QUERY TEXT:    Pt admitted with Influenza A, with sepsis confirmed in Dr Landeros  query   response, noted to have BP 68/45 with ED noting \" patient now meets criteria   for: Septic Shock\".  Please document in the progress notes and discharge   summary if you are evaluating and/or treating any of the following:    The medical record reflects the following:  Risk Factors: 79-year-old with Influenza A    Clinical Indicators:  ED \" patient now meets criteria for: Septic Shock\"  25 0854 68/45 (MAP 53)  25 0930 80/25 (MAP 43)  25 1015 88/57 (MAP 67)  25 1115 88/39 (MAP 56)    Treatment:  2,670 ml NS bolus (), midodrine tid, 2/15 Stop IVF sounding   too wet  Options provided:  -- Septic Shock  -- Hypotension without Shock  -- Other - I will add my own diagnosis  -- Disagree - Not applicable / Not valid  -- Disagree - Clinically unable to determine / Unknown  -- Refer to Clinical Documentation Reviewer    PROVIDER RESPONSE TEXT:    This patient is in septic shock.    Query created by: MILANA CORTES on 2025 12:44 PM      Electronically signed by:  Yandel Landeros MD 2025 5:02 PM

## 2025-02-19 NOTE — PLAN OF CARE
Problem: Respiratory - Adult  Goal: Achieves optimal ventilation and oxygenation  2/19/2025 0049 by Awilda Wilkins RCP  Outcome: Progressing

## 2025-02-20 PROCEDURE — 2500000003 HC RX 250 WO HCPCS: Performed by: INTERNAL MEDICINE

## 2025-02-20 PROCEDURE — 6370000000 HC RX 637 (ALT 250 FOR IP): Performed by: INTERNAL MEDICINE

## 2025-02-20 PROCEDURE — 6360000002 HC RX W HCPCS: Performed by: INTERNAL MEDICINE

## 2025-02-20 PROCEDURE — 2060000000 HC ICU INTERMEDIATE R&B

## 2025-02-20 PROCEDURE — 94640 AIRWAY INHALATION TREATMENT: CPT

## 2025-02-20 RX ORDER — PREDNISONE 20 MG/1
20 TABLET ORAL DAILY
Status: DISCONTINUED | OUTPATIENT
Start: 2025-02-27 | End: 2025-02-21

## 2025-02-20 RX ORDER — PREDNISONE 5 MG/1
10 TABLET ORAL DAILY
Status: DISCONTINUED | OUTPATIENT
Start: 2025-03-02 | End: 2025-02-21

## 2025-02-20 RX ORDER — PREDNISONE 20 MG/1
40 TABLET ORAL DAILY
Status: DISCONTINUED | OUTPATIENT
Start: 2025-02-21 | End: 2025-02-21

## 2025-02-20 RX ADMIN — SERTRALINE HYDROCHLORIDE 25 MG: 25 TABLET ORAL at 08:35

## 2025-02-20 RX ADMIN — THERA TABS 1 TABLET: TAB at 08:35

## 2025-02-20 RX ADMIN — Medication: at 22:51

## 2025-02-20 RX ADMIN — BUSPIRONE HYDROCHLORIDE 15 MG: 10 TABLET ORAL at 08:34

## 2025-02-20 RX ADMIN — ZINC SULFATE 220 MG (50 MG) CAPSULE 50 MG: CAPSULE at 08:35

## 2025-02-20 RX ADMIN — OLANZAPINE 2.5 MG: 5 TABLET, FILM COATED ORAL at 08:35

## 2025-02-20 RX ADMIN — Medication 3 MG: at 22:47

## 2025-02-20 RX ADMIN — ENOXAPARIN SODIUM 40 MG: 100 INJECTION SUBCUTANEOUS at 09:35

## 2025-02-20 RX ADMIN — DIVALPROEX SODIUM 125 MG: 125 CAPSULE ORAL at 08:35

## 2025-02-20 RX ADMIN — OLANZAPINE 2.5 MG: 5 TABLET, FILM COATED ORAL at 22:47

## 2025-02-20 RX ADMIN — PANTOPRAZOLE SODIUM 40 MG: 40 TABLET, DELAYED RELEASE ORAL at 07:21

## 2025-02-20 RX ADMIN — CALCIUM CARBONATE (ANTACID) CHEW TAB 500 MG 500 MG: 500 CHEW TAB at 08:34

## 2025-02-20 RX ADMIN — BUSPIRONE HYDROCHLORIDE 15 MG: 10 TABLET ORAL at 22:47

## 2025-02-20 RX ADMIN — Medication: at 09:36

## 2025-02-20 RX ADMIN — MIDODRINE HYDROCHLORIDE 10 MG: 5 TABLET ORAL at 13:08

## 2025-02-20 RX ADMIN — CALCIUM CARBONATE (ANTACID) CHEW TAB 500 MG 500 MG: 500 CHEW TAB at 22:47

## 2025-02-20 RX ADMIN — IPRATROPIUM BROMIDE AND ALBUTEROL SULFATE 1 DOSE: 2.5; .5 SOLUTION RESPIRATORY (INHALATION) at 20:32

## 2025-02-20 RX ADMIN — BUDESONIDE 500 MCG: 0.5 INHALANT RESPIRATORY (INHALATION) at 07:16

## 2025-02-20 RX ADMIN — DIVALPROEX SODIUM 125 MG: 125 CAPSULE ORAL at 22:47

## 2025-02-20 RX ADMIN — ASPIRIN 81 MG: 81 TABLET, COATED ORAL at 08:35

## 2025-02-20 RX ADMIN — MIDODRINE HYDROCHLORIDE 10 MG: 5 TABLET ORAL at 08:35

## 2025-02-20 RX ADMIN — IPRATROPIUM BROMIDE AND ALBUTEROL SULFATE 1 DOSE: 2.5; .5 SOLUTION RESPIRATORY (INHALATION) at 07:11

## 2025-02-20 RX ADMIN — BUDESONIDE 500 MCG: 0.5 INHALANT RESPIRATORY (INHALATION) at 20:32

## 2025-02-20 RX ADMIN — SODIUM CHLORIDE, PRESERVATIVE FREE 10 ML: 5 INJECTION INTRAVENOUS at 09:36

## 2025-02-20 RX ADMIN — MIDODRINE HYDROCHLORIDE 10 MG: 5 TABLET ORAL at 18:30

## 2025-02-20 RX ADMIN — SODIUM CHLORIDE, PRESERVATIVE FREE 10 ML: 5 INJECTION INTRAVENOUS at 22:51

## 2025-02-20 RX ADMIN — IPRATROPIUM BROMIDE AND ALBUTEROL SULFATE 1 DOSE: 2.5; .5 SOLUTION RESPIRATORY (INHALATION) at 11:47

## 2025-02-20 RX ADMIN — WATER 40 MG: 1 INJECTION INTRAMUSCULAR; INTRAVENOUS; SUBCUTANEOUS at 09:35

## 2025-02-20 RX ADMIN — WATER 40 MG: 1 INJECTION INTRAMUSCULAR; INTRAVENOUS; SUBCUTANEOUS at 03:07

## 2025-02-20 NOTE — CARE COORDINATION
Transition of Care Plan:    RUR: 17%   Prior Level of Functioning: assistance  Disposition: [facility]  BRIANA: 2/21/25  If SNF or IPR: Date FOC offered:   Date FOC received:   Accepting facility:   Date authorization started with reference number:   Date authorization received and expires:   Follow up appointments: Specialists  DME needed: provided at facility  Transportation at discharge: BLS  IM/IMM Medicare/ letter given: provided via email  Is patient a  and connected with VA?    If yes, was [facility] transfer form completed and VA notified?   Caregiver Contact:     Mariann Cruz (Spouse)  [phone] (Mobile)     Discharge Caregiver contacted prior to discharge?   Care Conference needed?   Barriers to discharge:     CM reviewed chart and spoke with [facility] of [facility] to give an update regarding d/c.  Violeta is ready to accept pt once medically stable.    12:04 p.m. CM informed in IDRs pt should be ready for d/c today. CM spoke with Violeta who is ready to accept once order entered.  She will need d/c summary and med list.  Nursing can call report to [phone].  CM called and spoke with spouse to discuss d/c.  2IM notice emailed to [email or web address].    1:59 p.m. CM updated pt's nurse Katherin of plan.  [facility] to transport at 5 p.m.    3:52 p.m. CM informed pt will not discharge today by attending.  CM canceled transport for today and placed it on will call for tomorrow. CM left a message with admission's voicemail.  CM updated pt's spouse.     02/20/25 3773   Services At/After Discharge   Transition of Care Consult (CM Consult) Discharge Planning   Services At/After Discharge Long term care  ([facility])   Mode of Transport at Discharge BLS  (5 p.m.)   Condition of Participation: Discharge Planning   The Plan for Transition of Care is related to the following treatment goals: return to [facility]   The Patient and/or Patient Representative was provided with a Choice of Provider? Patient

## 2025-02-20 NOTE — PROGRESS NOTES
End of Shift Note    Bedside shift change report given to Fanny (oncoming nurse) by Shar Barnard RN (offgoing nurse).  Report included the following information SBAR, Intake/Output, Recent Results    Shift worked:  9890-3368     Shift summary and any significant changes:    Pt confused overnight, pulled out IV around 2000. New IV placed by Sobia. After this, no issues overnight   Concerns for physician to address:    Zone phone for oncoming shift:       Activity:  Level of Assistance: Moderate assist, patient does 50-74%  Number times ambulated in hallways past shift: 0  Number of times OOB to chair past shift: 0    Cardiac:   Cardiac Monitoring: Yes      Cardiac Rhythm: Sinus rhythm, Sinus lizz    Access:  Current line(s): PIV     Genitourinary:   Urinary Status: Voiding, External catheter    Respiratory:   O2 Device: None (Room air)  Chronic home O2 use?: NO  Incentive spirometer at bedside: NO    GI:  Last BM (including prior to admit): 02/18/25  Current diet:  ADULT DIET; Regular  Passing flatus: YES    Pain Management:   Patient states pain is manageable on current regimen: N/A    Skin:  Erick Scale Score: 16  Interventions: Wound Offloading (Prevention Methods): Pillows, Repositioning    Patient Safety:  Fall Risk: Nursing Judgement-Fall Risk High(Add Comments): Yes  Fall Risk Interventions  Nursing Judgement-Fall Risk High(Add Comments): Yes  Toilet Every 2 Hours-In Advance of Need: Yes  Hourly Visual Checks: In bed, Eyes closed  Fall Visual Posted: Socks  Room Door Open: Deferred for droplet isolation  Alarm On: Bed  Patient Moved Closer to Nursing Station: No    Active Consults:   IP CONSULT TO HOSPITALIST  IP CONSULT TO PHARMACY  IP CONSULT TO PALLIATIVE CARE    Length of Stay:  Expected LOS: 7  Actual LOS: 6    Shar Barnard, RN

## 2025-02-20 NOTE — PROGRESS NOTES
Hospitalist Progress Note    NAME:   Josue Cruz   : 1945   MRN: [id number]     Date/Time: 2025 6:26 PM  Patient PCP: No primary care provider on file.    Estimated discharge date: 25  Barriers: asthma re-exacerbating when IV steroids stopped      Assessment / Plan:  Principal Problem:    Sepsis (HCC)  Active Problems:    Hypotension    Influenza A    Dementia (HCC)    UTI (urinary tract infection)    Adrenal insufficiency (HCC)    DNR (do not resuscitate)    Moderate aortic stenosis by prior echocardiogram    Hyperkalemia    Plan:  2/15   Cont current Abx plus Tamiflu  Cont stress dose Hydrocortisone  Stop IVF sounding too wet      Sounds great  Confused as is his baseline  MRSA probe negative Vanco stopped  Taper to oral hydrocortisone      Wheezing again so component of copd/asthma  [id number]  Pulmicort neb BID  Cont Abx  Pleasant confusion  Replace K      Decrease Zoloft can worsen Agitation  Have to stop hydrocortisone and go back to IV solumedrol d/t wheezing  Increase Zyprexa  PRN Geodon  Consider Hospice      Patient not agitated but now sedated  Decrease zyorexa dose  D/w Palliative care      Patient balance today neither agitated nor sedated. No geodon prn needed  Stop Solumedrol since lungs clear and BP good  Start prednisone 30 mg Q AM in AM  If BP good and does not re-exacerbate asthma can go to boarding home on  Inhaled duo nebs and pulmicort  Palliative care updated to plan and challenges  Remains high risk for readmission    Medical Decision Making:   I personally reviewed labs:yes as above  I personally reviewed imaging:NA  I personally reviewed EKG:NA  Toxic drug monitoring: [facility] meds  Discussed case with: RN    Code Status: DNR  DVT Prophylaxis: Lovenox  GI Prophylaxis:PPI  Subjective:     Chief Complaint / Reason for Physician Visit  Patient alert and present no wheezing no cough no dyspnea  Objective:     VITALS:   Last 24hrs VS reviewed since prior

## 2025-02-20 NOTE — PROGRESS NOTES
ADULT PROTOCOL: JET AEROSOL ASSESSMENT    Patient  Josue Cruz     79 y.o.   male     2/20/2025  7:23 AM    Breath Sounds Pre Procedure: Breath Sounds Pre-Tx LORENE: Expiratory wheezes                                  Breath Sounds Pre-Tx LLL: Expiratory wheezes        Breath Sounds Pre-Tx RUL: Expiratory wheezes        Breath Sounds Pre-Tx RML: Expiratory wheezes        Breath Sounds Pre-Tx RLL: Expiratory wheezes  Breath Sounds Post Procedure: Breath Sounds Post-Tx LORENE: Expiratory wheezes          Breath Sounds Post-Tx LLL: Expiratory wheezes          Breath Sounds Post-Tx RUL: Expiratory wheezes          Breath Sounds Post-Tx RML: Expiratory wheezes          Breath Sounds Post-Tx RLL: Expiratory wheezes                                       Heart Rate: Pre procedure Pre-Tx Pulse: 70           Post procedure Post-Tx Pulse: 60    Resp Rate: Pre procedure Pre-Tx Resps: 15           Post procedure Post-Tx Resps: 16    Oxygen: O2 Therapy: Room air        Changed: No    SpO2:  SpO2: 95 %   without Oxygen                Nebulizer Therapy: Current medications Medications: Budesonide      Changed: No        Problem List:   Patient Active Problem List   Diagnosis    Hypotension    Sepsis ([facility])    Influenza A    Moderate aortic stenosis by prior echocardiogram    Dementia (HCC)    UTI (urinary tract infection)    Adrenal insufficiency    DNR (do not resuscitate)    Palliative care by specialist    Agitation due to dementia ([facility])       Respiratory Therapist: Kimberli Acevedo RT

## 2025-02-21 LAB
GLUCOSE BLD STRIP.AUTO-MCNC: 125 MG/DL (ref 65–117)
SERVICE CMNT-IMP: ABNORMAL

## 2025-02-21 PROCEDURE — 82962 GLUCOSE BLOOD TEST: CPT

## 2025-02-21 PROCEDURE — 6360000002 HC RX W HCPCS: Performed by: INTERNAL MEDICINE

## 2025-02-21 PROCEDURE — 6370000000 HC RX 637 (ALT 250 FOR IP): Performed by: INTERNAL MEDICINE

## 2025-02-21 PROCEDURE — 94640 AIRWAY INHALATION TREATMENT: CPT

## 2025-02-21 PROCEDURE — 2500000003 HC RX 250 WO HCPCS: Performed by: INTERNAL MEDICINE

## 2025-02-21 PROCEDURE — 1100000000 HC RM PRIVATE

## 2025-02-21 RX ORDER — PREDNISONE 20 MG/1
60 TABLET ORAL DAILY
Status: DISCONTINUED | OUTPATIENT
Start: 2025-02-22 | End: 2025-02-22 | Stop reason: HOSPADM

## 2025-02-21 RX ORDER — PREDNISONE 5 MG/1
10 TABLET ORAL DAILY
Status: DISCONTINUED | OUTPATIENT
Start: 2025-03-02 | End: 2025-02-22 | Stop reason: HOSPADM

## 2025-02-21 RX ORDER — PREDNISONE 20 MG/1
20 TABLET ORAL DAILY
Status: DISCONTINUED | OUTPATIENT
Start: 2025-02-27 | End: 2025-02-22 | Stop reason: HOSPADM

## 2025-02-21 RX ADMIN — ZIPRASIDONE MESYLATE 10 MG: 20 INJECTION, POWDER, LYOPHILIZED, FOR SOLUTION INTRAMUSCULAR at 16:07

## 2025-02-21 RX ADMIN — IPRATROPIUM BROMIDE AND ALBUTEROL SULFATE 1 DOSE: 2.5; .5 SOLUTION RESPIRATORY (INHALATION) at 07:47

## 2025-02-21 RX ADMIN — OLANZAPINE 2.5 MG: 5 TABLET, FILM COATED ORAL at 20:35

## 2025-02-21 RX ADMIN — Medication 3 MG: at 20:35

## 2025-02-21 RX ADMIN — MIDODRINE HYDROCHLORIDE 10 MG: 5 TABLET ORAL at 09:53

## 2025-02-21 RX ADMIN — DIVALPROEX SODIUM 125 MG: 125 CAPSULE ORAL at 09:58

## 2025-02-21 RX ADMIN — CALCIUM CARBONATE (ANTACID) CHEW TAB 500 MG 500 MG: 500 CHEW TAB at 09:59

## 2025-02-21 RX ADMIN — PREDNISONE 40 MG: 20 TABLET ORAL at 09:58

## 2025-02-21 RX ADMIN — ACETAMINOPHEN 650 MG: 325 TABLET ORAL at 20:34

## 2025-02-21 RX ADMIN — ASPIRIN 81 MG: 81 TABLET, COATED ORAL at 09:57

## 2025-02-21 RX ADMIN — CALCIUM CARBONATE (ANTACID) CHEW TAB 500 MG 500 MG: 500 CHEW TAB at 20:35

## 2025-02-21 RX ADMIN — OLANZAPINE 2.5 MG: 5 TABLET, FILM COATED ORAL at 09:57

## 2025-02-21 RX ADMIN — Medication: at 20:33

## 2025-02-21 RX ADMIN — BUSPIRONE HYDROCHLORIDE 15 MG: 10 TABLET ORAL at 09:57

## 2025-02-21 RX ADMIN — HYDROXYZINE HYDROCHLORIDE 25 MG: 25 TABLET, FILM COATED ORAL at 18:01

## 2025-02-21 RX ADMIN — THERA TABS 1 TABLET: TAB at 09:57

## 2025-02-21 RX ADMIN — IPRATROPIUM BROMIDE AND ALBUTEROL SULFATE 1 DOSE: 2.5; .5 SOLUTION RESPIRATORY (INHALATION) at 20:50

## 2025-02-21 RX ADMIN — DIVALPROEX SODIUM 125 MG: 125 CAPSULE ORAL at 20:34

## 2025-02-21 RX ADMIN — PANTOPRAZOLE SODIUM 40 MG: 40 TABLET, DELAYED RELEASE ORAL at 06:47

## 2025-02-21 RX ADMIN — ENOXAPARIN SODIUM 40 MG: 100 INJECTION SUBCUTANEOUS at 09:58

## 2025-02-21 RX ADMIN — BUDESONIDE 500 MCG: 0.5 INHALANT RESPIRATORY (INHALATION) at 07:53

## 2025-02-21 RX ADMIN — ACETAMINOPHEN 650 MG: 325 TABLET ORAL at 09:57

## 2025-02-21 RX ADMIN — MIDODRINE HYDROCHLORIDE 10 MG: 5 TABLET ORAL at 13:22

## 2025-02-21 RX ADMIN — ZINC SULFATE 220 MG (50 MG) CAPSULE 50 MG: CAPSULE at 09:58

## 2025-02-21 RX ADMIN — MIDODRINE HYDROCHLORIDE 10 MG: 5 TABLET ORAL at 15:58

## 2025-02-21 RX ADMIN — SODIUM CHLORIDE, PRESERVATIVE FREE 10 ML: 5 INJECTION INTRAVENOUS at 09:59

## 2025-02-21 RX ADMIN — SERTRALINE HYDROCHLORIDE 25 MG: 25 TABLET ORAL at 09:57

## 2025-02-21 RX ADMIN — SODIUM CHLORIDE, PRESERVATIVE FREE 10 ML: 5 INJECTION INTRAVENOUS at 20:33

## 2025-02-21 RX ADMIN — BUSPIRONE HYDROCHLORIDE 15 MG: 10 TABLET ORAL at 20:34

## 2025-02-21 RX ADMIN — BUDESONIDE 500 MCG: 0.5 INHALANT RESPIRATORY (INHALATION) at 20:55

## 2025-02-21 RX ADMIN — Medication: at 09:59

## 2025-02-21 ASSESSMENT — PAIN SCALES - GENERAL
PAINLEVEL_OUTOF10: 2
PAINLEVEL_OUTOF10: 2
PAINLEVEL_OUTOF10: 6
PAINLEVEL_OUTOF10: 0
PAINLEVEL_OUTOF10: 0
PAINLEVEL_OUTOF10: 4
PAINLEVEL_OUTOF10: 2

## 2025-02-21 ASSESSMENT — PAIN SCALES - WONG BAKER
WONGBAKER_NUMERICALRESPONSE: NO HURT
WONGBAKER_NUMERICALRESPONSE: NO HURT

## 2025-02-21 ASSESSMENT — PAIN - FUNCTIONAL ASSESSMENT: PAIN_FUNCTIONAL_ASSESSMENT: ACTIVITIES ARE NOT PREVENTED

## 2025-02-21 ASSESSMENT — PAIN DESCRIPTION - ORIENTATION: ORIENTATION: LEFT

## 2025-02-21 ASSESSMENT — PAIN DESCRIPTION - LOCATION
LOCATION: FLANK
LOCATION: RIB CAGE

## 2025-02-21 ASSESSMENT — PAIN DESCRIPTION - DESCRIPTORS: DESCRIPTORS: STABBING

## 2025-02-21 NOTE — CARE COORDINATION
Transition of Care Plan:     RUR: 17%   Prior Level of Functioning: assistance  Disposition: LTC at [facility]  BRIANA: 2/21/25  If SNF or IPR: Date FOC offered:   Date FOC received:   Accepting facility:   Date authorization started with reference number:   Date authorization received and expires:   Follow up appointments: Specialists  DME needed: provided at facility  Transportation at discharge: BLS  IM/IMM Medicare/ letter given: provided via email  Is patient a [place] and connected with VA?               If yes, was  transfer form completed and VA notified?   Caregiver Contact:     Mariann Cruz (Spouse)  [phone] (Mobile)      Discharge Caregiver contacted prior to discharge?   Care Conference needed?   Barriers to discharge:     CM acknowledged d/c order.  CM called and updated [facility].  Nursing can call report to [phone].  CM updated pt's wife.  [facility] will transport at noon.  CM updated pt's nurse, Donavan.    11:12 a.m. CM informed by attending d/c is canceled.  CM called and updated [facility].  CM called and updated spouse.  CM called and updated [facility].       02/21/25 1033   Services At/After Discharge   Transition of Care Consult (CM Consult) Discharge Planning   Services At/After Discharge Long term care   Mode of Transport at Discharge [facility]  (noon)   Condition of Participation: Discharge Planning   The Plan for Transition of Care is related to the following treatment goals: return to ltc at [facility]   The Patient and/or Patient Representative was provided with a Choice of Provider? Patient Representative   Name of the Patient Representative who was provided with the Choice of Provider and agrees with the Discharge Plan?  Mrs. Cruz   The Patient and/Or Patient Representative agree with the Discharge Plan? Yes   Freedom of Choice list was provided with basic dialogue that supports the patient's individualized plan of care/goals, treatment preferences, and shares the  quality data associated with the providers?  Yes             Nona Parmar LMSW, LCSW  Care Management, [facility]  [phone]

## 2025-02-21 NOTE — PROGRESS NOTES
11:57 1st failed attempt made to call and give report [facility] at [phone] as provided by case manage with no answer.       11:58 2nd failed attempt made to give report on patient,  answered and was placed on hold.       12:03 3RD failed attempt made to give report to [facility] facility,  ariel answered and call back phone number was left for receiving nurse.

## 2025-02-21 NOTE — PROGRESS NOTES
Hospitalist Progress Note    NAME:   Josue Cruz   : 1945   MRN: [id number]     Date/Time: 2025 2:10 PM  Patient PCP: No primary care provider on file.    Estimated discharge date: ? , back to LTC- [facility]  Barriers: Wheezing active      Assessment / Plan:  Principal Problem:    Sepsis (HCC)  Active Problems:    Hypotension    Influenza A    Dementia (HCC)    UTI (urinary tract infection)    Adrenal insufficiency ([facility])    DNR (do not resuscitate)    Moderate aortic stenosis by prior echocardiogram    Hyperkalemia    Patient stable hemodynamically and on RA oxygen- but actively wheezing  On geodon prn needed  Off IV Solumedrol yesterday --Wheezing today+  Increase prednisone to 60 mg today   BP stable  Inhaled duo nebs and pulmicort  IP Palliative care following- agreed pt will be high risk for re-admission if Dcd today with active wheezing  IP CM notified of holding DC back to LTC today  Remains high risk for readmission regardless    Medical Decision Making:   I personally reviewed labs:yes as above  I personally reviewed imaging:NA  I personally reviewed EKG:NA  Toxic drug monitoring: [facility] meds  Discussed case with: RN    Code Status: DNR  DVT Prophylaxis: Lovenox  GI Prophylaxis:PPI  Subjective:     Chief Complaint / Reason for Physician Visit  Patient arousable and pleasant  Wheezing noted +  Objective:     VITALS:   Last 24hrs VS reviewed since prior progress note. Most recent are:  Patient Vitals for the past 24 hrs:   BP Temp Temp src Pulse Resp SpO2 Weight   25 0747 -- -- -- 66 18 97 % --   25 0600 -- -- -- -- -- -- 94.3 kg (208 lb)   25 0253 127/73 97.9 °F (36.6 °C) Axillary 57 16 -- --   25 2254 120/77 97.7 °F (36.5 °C) Oral 71 13 96 % --   25 94/62 97.7 °F (36.5 °C) Oral 83 24 96 % --   25 1500 (!) 127/99 97.5 °F (36.4 °C) Oral 72 23 -- --         Intake/Output Summary (Last 24 hours) at 2025 1410  Last data filed at 2025

## 2025-02-21 NOTE — PROGRESS NOTES
End of Shift Note    Bedside shift change report given to Shar (oncoming nurse) by CAROL MELENDREZ RN (offgoing nurse).  Report included the following information SBAR, Kardex, and MAR    Shift worked:  7a-7p     Shift summary and any significant changes:     Pt confused. Discharge delayed until tomorrow.     Concerns for physician to address:       Zone phone for oncoming shift:          Activity:  Level of Assistance: Minimal assist, patient does 75% or more  Number times ambulated in hallways past shift: 0  Number of times OOB to chair past shift: 1    Cardiac:   Cardiac Monitoring: Yes      Cardiac Rhythm: Sinus rhythm    Access:  Current line(s): Other pt removed line between shift change and now.    Genitourinary:   Urinary Status: External catheter    Respiratory:   O2 Device: None (Room air)  Chronic home O2 use?: NO  Incentive spirometer at bedside: N/A    GI:  Last BM (including prior to admit): 02/19/25  Current diet:  ADULT DIET; Regular  Passing flatus: YES    Pain Management:   Patient states pain is manageable on current regimen: N/A    Skin:  Erick Scale Score: 16  Interventions: Wound Offloading (Prevention Methods): Repositioning    Patient Safety:  Fall Risk: Nursing Judgement-Fall Risk High(Add Comments): Yes  Fall Risk Interventions  Nursing Judgement-Fall Risk High(Add Comments): Yes  Toilet Every 2 Hours-In Advance of Need: Yes  Hourly Visual Checks: In chair, Awake  Fall Visual Posted: Armband, Socks  Room Door Open: Yes  Alarm On: Chair  Patient Moved Closer to Nursing Station: No    Active Consults:   IP CONSULT TO HOSPITALIST  IP CONSULT TO PHARMACY  IP CONSULT TO PALLIATIVE CARE    Length of Stay:  Expected LOS: 6  Actual LOS: 6    CAROL MELENDREZ RN

## 2025-02-21 NOTE — PROGRESS NOTES
ADULT PROTOCOL: JET AEROSOL ASSESSMENT    Patient  Josue Cruz     79 y.o.   male     2/21/2025  7:57 AM    Breath Sounds Pre Procedure: Breath Sounds Pre-Tx LORENE: Expiratory wheezes                                  Breath Sounds Pre-Tx LLL: Expiratory wheezes        Breath Sounds Pre-Tx RUL: Expiratory wheezes        Breath Sounds Pre-Tx RML: Expiratory wheezes        Breath Sounds Pre-Tx RLL: Expiratory wheezes  Breath Sounds Post Procedure: Breath Sounds Post-Tx LORENE: Expiratory wheezes          Breath Sounds Post-Tx LLL: Expiratory wheezes          Breath Sounds Post-Tx RUL: Expiratory wheezes          Breath Sounds Post-Tx RML: Expiratory wheezes          Breath Sounds Post-Tx RLL: Expiratory wheezes                                         Heart Rate: Pre procedure Pre-Tx Pulse: 66           Post procedure Post-Tx Pulse: 62    Resp Rate: Pre procedure Pre-Tx Resps: 18           Post procedure Post-Tx Resps: 18        SpO2:  SpO2: 97 %   without Oxygen                Nebulizer Therapy: Current medications Medications: Budesonide      Changed: No        Problem List:   Patient Active Problem List   Diagnosis    Hypotension    Sepsis (HCC)    Influenza A    Moderate aortic stenosis by prior echocardiogram    Dementia (HCC)    UTI (urinary tract infection)    Adrenal insufficiency    DNR (do not resuscitate)    Palliative care by specialist    Agitation due to dementia (HCC)       Respiratory Therapist: Kimberli Acevedo, RT

## 2025-02-21 NOTE — PROGRESS NOTES
End of Shift Note    Bedside shift change report given to Donavan (oncoming nurse) by Shar Barnard, CARMITA (offgoing nurse).  Report included the following information SBAR, Intake/Output, MAR, Recent Results, and Cardiac Rhythm      Shift worked:  3236-5965     Shift summary and any significant changes:    Pt confused tried to get out of bed once. Able to redirect. Pulled out IV, new line placed and wrapped with coban.      Concerns for physician to address:    Zone phone for oncoming shift:       Activity:  Level of Assistance: Minimal assist, patient does 75% or more  Number times ambulated in hallways past shift: 0  Number of times OOB to chair past shift: 0    Cardiac:   Cardiac Monitoring: Yes      Cardiac Rhythm: Sinus rhythm    Access:  Current line(s): PIV     Genitourinary:   Urinary Status: Voiding, External catheter    Respiratory:   O2 Device: None (Room air)  Chronic home O2 use?: NO  Incentive spirometer at bedside: NO    GI:  Last BM (including prior to admit): 02/20/25  Current diet:  ADULT DIET; Regular  Passing flatus: YES    Pain Management:   Patient states pain is manageable on current regimen: N/A    Skin:  Erick Scale Score: 16  Interventions: Wound Offloading (Prevention Methods): Elevate heels, Turning, Pillows    Patient Safety:  Fall Risk: Nursing Judgement-Fall Risk High(Add Comments): Yes  Fall Risk Interventions  Nursing Judgement-Fall Risk High(Add Comments): Yes  Toilet Every 2 Hours-In Advance of Need: Yes  Hourly Visual Checks: Awake, In bed  Fall Visual Posted: Armband, Socks  Room Door Open: Yes  Alarm On: Bed  Patient Moved Closer to Nursing Station: No    Active Consults:   IP CONSULT TO HOSPITALIST  IP CONSULT TO PHARMACY  IP CONSULT TO PALLIATIVE CARE    Length of Stay:  Expected LOS: 6  Actual LOS: 7    Shar Barnard, RN

## 2025-02-22 VITALS
RESPIRATION RATE: 16 BRPM | HEIGHT: 72 IN | OXYGEN SATURATION: 80 % | SYSTOLIC BLOOD PRESSURE: 104 MMHG | HEART RATE: 97 BPM | TEMPERATURE: 98.5 F | DIASTOLIC BLOOD PRESSURE: 87 MMHG | WEIGHT: 210.4 LBS | BODY MASS INDEX: 28.5 KG/M2

## 2025-02-22 PROCEDURE — 6370000000 HC RX 637 (ALT 250 FOR IP): Performed by: INTERNAL MEDICINE

## 2025-02-22 PROCEDURE — 86787 VARICELLA-ZOSTER ANTIBODY: CPT

## 2025-02-22 PROCEDURE — 2500000003 HC RX 250 WO HCPCS: Performed by: INTERNAL MEDICINE

## 2025-02-22 PROCEDURE — 94640 AIRWAY INHALATION TREATMENT: CPT

## 2025-02-22 PROCEDURE — 6360000002 HC RX W HCPCS: Performed by: INTERNAL MEDICINE

## 2025-02-22 PROCEDURE — 36415 COLL VENOUS BLD VENIPUNCTURE: CPT

## 2025-02-22 RX ORDER — AMMONIUM LACTATE 12 G/100G
LOTION TOPICAL DAILY
Status: DISCONTINUED | OUTPATIENT
Start: 2025-02-22 | End: 2025-02-22 | Stop reason: HOSPADM

## 2025-02-22 RX ORDER — PREDNISONE 10 MG/1
TABLET ORAL
Qty: 24 TABLET | Refills: 0 | Status: SHIPPED
Start: 2025-02-23 | End: 2025-03-05

## 2025-02-22 RX ADMIN — CALCIUM CARBONATE (ANTACID) CHEW TAB 500 MG 500 MG: 500 CHEW TAB at 10:06

## 2025-02-22 RX ADMIN — ENOXAPARIN SODIUM 40 MG: 100 INJECTION SUBCUTANEOUS at 10:06

## 2025-02-22 RX ADMIN — PANTOPRAZOLE SODIUM 40 MG: 40 TABLET, DELAYED RELEASE ORAL at 06:19

## 2025-02-22 RX ADMIN — THERA TABS 1 TABLET: TAB at 10:03

## 2025-02-22 RX ADMIN — SERTRALINE HYDROCHLORIDE 25 MG: 25 TABLET ORAL at 10:03

## 2025-02-22 RX ADMIN — BUSPIRONE HYDROCHLORIDE 15 MG: 10 TABLET ORAL at 10:02

## 2025-02-22 RX ADMIN — ASPIRIN 81 MG: 81 TABLET, COATED ORAL at 10:03

## 2025-02-22 RX ADMIN — OLANZAPINE 2.5 MG: 5 TABLET, FILM COATED ORAL at 10:03

## 2025-02-22 RX ADMIN — MIDODRINE HYDROCHLORIDE 10 MG: 5 TABLET ORAL at 10:03

## 2025-02-22 RX ADMIN — SODIUM CHLORIDE, PRESERVATIVE FREE 10 ML: 5 INJECTION INTRAVENOUS at 10:06

## 2025-02-22 RX ADMIN — ZINC SULFATE 220 MG (50 MG) CAPSULE 50 MG: CAPSULE at 10:04

## 2025-02-22 RX ADMIN — Medication: at 12:20

## 2025-02-22 RX ADMIN — DIVALPROEX SODIUM 125 MG: 125 CAPSULE ORAL at 10:03

## 2025-02-22 RX ADMIN — IPRATROPIUM BROMIDE AND ALBUTEROL SULFATE 1 DOSE: 2.5; .5 SOLUTION RESPIRATORY (INHALATION) at 07:27

## 2025-02-22 RX ADMIN — Medication: at 10:08

## 2025-02-22 RX ADMIN — PREDNISONE 60 MG: 20 TABLET ORAL at 10:04

## 2025-02-22 RX ADMIN — BUDESONIDE 500 MCG: 0.5 INHALANT RESPIRATORY (INHALATION) at 07:27

## 2025-02-22 RX ADMIN — MIDODRINE HYDROCHLORIDE 10 MG: 5 TABLET ORAL at 12:20

## 2025-02-22 ASSESSMENT — PAIN SCALES - GENERAL
PAINLEVEL_OUTOF10: 0
PAINLEVEL_OUTOF10: 0

## 2025-02-22 NOTE — PROGRESS NOTES
End of Shift Note    Bedside shift change report given to CARMITA Rubio (oncoming nurse) by Donavan Whitten RN (offgoing nurse).  Report included the following information SBAR, Kardex, Intake/Output, MAR, Recent Results, Med Rec Status, Alarm Parameters , and Quality Measures    Shift worked:  8840-7208     Shift summary and any significant changes:     Patient pleasantly confused during this shift, tele sitter initiated to decreased the risk for fall, patient continues to had hallucination and impulsiveness. Patient reoriented to surrounding, fall precaution in place.       Concerns for physician to address:       Zone phone for oncoming shift:          Activity:  Level of Assistance: Minimal assist, patient does 75% or more  Number times ambulated in hallways past shift: 0  Number of times OOB to chair past shift: 0    Cardiac:   Cardiac Monitoring: Yes      Cardiac Rhythm: Sinus rhythm    Access:  Current line(s): PIV     Genitourinary:   Urinary Status: Voiding, External catheter    Respiratory:   O2 Device: None (Room air)  Chronic home O2 use?: NO  Incentive spirometer at bedside: NO    GI:  Last BM (including prior to admit): 02/20/25  Current diet:  ADULT DIET; Regular  Passing flatus: YES    Pain Management:   Patient states pain is manageable on current regimen: YES    Skin:  Erick Scale Score: 16  Interventions: Wound Offloading (Prevention Methods): Elevate heels, Turning, Pillows    Patient Safety:  Fall Risk: Nursing Judgement-Fall Risk High(Add Comments): Yes  Fall Risk Interventions  Nursing Judgement-Fall Risk High(Add Comments): Yes  Toilet Every 2 Hours-In Advance of Need: Yes  Hourly Visual Checks: Awake, In bed  Fall Visual Posted: Armband, Socks, Fall sign posted  Room Door Open: Yes  Alarm On: Bed  Patient Moved Closer to Nursing Station: No    Active Consults:   IP CONSULT TO HOSPITALIST  IP CONSULT TO PHARMACY  IP CONSULT TO PALLIATIVE CARE    Length of Stay:  Expected LOS: 8  Actual LOS:

## 2025-02-22 NOTE — DISCHARGE INSTRUCTIONS
HOSPITALIST DISCHARGE INSTRUCTIONS    NAME: Josue Cruz   :  1945   MRN:  [id number]     Date/Time:  2025 11:18 AM    ADMIT DATE: 2025     DISCHARGE DATE: 2025     DISCHARGE DIAGNOSIS:  Influenza A- tamiflu completed, tapering prednisone for wheezing/reactive airway disease on dicharge  Chronic Hypotension- cont on Midodrine as before  Adrenal insufficiency (HCC)  Dementia (HCC)  UTI (urinary tract infection)  Moderate aortic stenosis by prior echocardiogram  Hyperkalemia  Chronic Rash- cont Lac-Hydrin Lotion daily  DNR (do not resuscitate)    MEDICATIONS:  As per medication reconciliation  list  It is important that you take the medication exactly as they are prescribed.   Keep your medication in the bottles provided by the pharmacist and keep a list of the medication names, dosages, and times to be taken in your wallet.   Do not take other medications without consulting your doctor.     Pain Management: per above medications    What to do at Home    Recommended diet:  regular diet    Recommended activity: activity as tolerated    If you have questions regarding the hospital related prescriptions or hospital related issues please call at .    If you experience any of the following symptoms then please call your primary care physician or return to the emergency room if you cannot get hold of your doctor:  Fever, chills, nausea, vomiting, diarrhea, change in mentation, falling, bleeding, shortness of breath,     Follow Up:  PCP you are to call and set up an appointment to see them in 7-10 days.      Information obtained by :  I understand that if any problems occur once I am at home I am to contact my physician.    I understand and acknowledge receipt of the instructions indicated above.                                                                                                                                           Physician's or R.N.'s Signature

## 2025-02-22 NOTE — PLAN OF CARE
Problem: Discharge Planning  Goal: Discharge to home or other facility with appropriate resources  Outcome: Progressing  Flowsheets (Taken 2/21/2025 2030)  Discharge to home or other facility with appropriate resources: Identify barriers to discharge with patient and caregiver     Problem: Safety - Adult  Goal: Free from fall injury  2/21/2025 2336 by Joanne Saha RN  Outcome: Progressing  Flowsheets (Taken 2/21/2025 2030)  Free From Fall Injury: Instruct family/caregiver on patient safety  2/21/2025 2017 by Donavan Whitten, RN  Outcome: Progressing     Problem: Pain  Goal: Verbalizes/displays adequate comfort level or baseline comfort level  Outcome: Progressing  Flowsheets  Taken 2/21/2025 2314  Verbalizes/displays adequate comfort level or baseline comfort level: Encourage patient to monitor pain and request assistance  Taken 2/21/2025 2031  Verbalizes/displays adequate comfort level or baseline comfort level: Encourage patient to monitor pain and request assistance     Problem: Skin/Tissue Integrity  Goal: Skin integrity remains intact  Description: 1.  Monitor for areas of redness and/or skin breakdown  2.  Assess vascular access sites hourly  3.  Every 4-6 hours minimum:  Change oxygen saturation probe site  4.  Every 4-6 hours:  If on nasal continuous positive airway pressure, respiratory therapy assess nares and determine need for appliance change or resting period  2/21/2025 2336 by Joanne Saha RN  Outcome: Progressing  Flowsheets (Taken 2/21/2025 2030)  Skin Integrity Remains Intact: Monitor for areas of redness and/or skin breakdown  2/21/2025 2017 by Donavna Whitten, RN  Outcome: Progressing     Problem: Respiratory - Adult  Goal: Achieves optimal ventilation and oxygenation  2/21/2025 2336 by Joanne Saha RN  Outcome: Progressing  2/21/2025 2049 by Kinsey Holden, RT  Outcome: Progressing  Flowsheets (Taken 2/21/2025 2030 by Joanne Saha, RN)  Achieves optimal ventilation and

## 2025-02-22 NOTE — DISCHARGE SUMMARY
Discharge Summary    Name: Josue Cruz  [id number]  YOB: 1945 (Age: 79 y.o.)   Date of Admission: 2/14/2025  Date of Discharge: 2/22/2025  Attending Physician: Paul Gomez MD    Discharge Diagnosis:     Influenza A- tamiflu completed, tapering prednisone for wheezing/reactive airway disease on dicharge  Chronic Hypotension- cont on Midodrine as before  Adrenal insufficiency (HCC)  Dementia (HCC)  UTI (urinary tract infection)  Moderate aortic stenosis by prior echocardiogram  Hyperkalemia  Chronic Rash- cont Lac-Hydrin Lotion daily  DNR (do not resuscitate)      Consultations:  IP CONSULT TO HOSPITALIST  IP CONSULT TO PHARMACY  IP CONSULT TO PALLIATIVE CARE      Brief Admission History/Reason for Admission Per Yandel Landeros MD:   \"79 y.o.  male with PMHx significant for low BP, Dementia, CAD and moderate Aortic stenosis  Who presnts from his AL/ memory care with FLU, sepsis, and Hypotension. Unable to give any real history  We were asked to admit for work up and evaluation of the above problems. \"    Brief Hospital Course by Main Problems:   Chronic Hypotension  Influenza A  Dementia (HCC)  UTI (urinary tract infection)  Adrenal insufficiency (HCC)  DNR (do not resuscitate)  Moderate aortic stenosis by prior echocardiogram  Hyperkalemia     Patient stable hemodynamically and on RA oxygen- but actively wheezing  On geodon prn needed  Off IV Solumedrol yesterday 2/20--Wheezing today+  Increase prednisone to 60 mg today   BP stable  Inhaled duo nebs and pulmicort  IP Palliative care following- agreed pt will be high risk for re-admission if Dcd today with active wheezing  IP CM notified of holding DC back to LTC today  Remains high risk for readmission regardless    Discharge Exam:  Patient seen and examined by me on discharge day.  Pertinent Findings:  Patient Vitals for the past 24 hrs:   BP Temp Temp src Pulse Resp SpO2 Weight   02/22/25 0836 -- -- -- 75

## 2025-02-22 NOTE — PROGRESS NOTES
0715 Bedside and Verbal shift change report given to CARMITA Beauchamp and CARMITA Haro (oncoming nurse) by CARMITA Rubio (offgoing nurse). Report included the following information Nurse Handoff Report.     1320 Attempt made to call report to [facility]. Call transferred to floor and ended. Called back, gave call back number to .     1356 Attempt made to call report to [facility].  stated they would have nurse call back.     1548 [facility] picked up pt. Handoff report given including DNR, MAR, head to toe, SBAR. Pt belongings sent with transport.     1555 Attempt made to call report to [facility]. Call transferred to floor, no answer.      Discharge Summary     Patient: Josue Cruz MRN: [id number]  SSN: [id number]    YOB: 1945  Age: 79 y.o.  Sex: male       Code Status: DNR  Allergies: No Known Allergies    Admit Date: 2/14/2025    Discharge Date: 2/22/2025      Admission Diagnoses: Influenza A [J10.1]  Sepsis ([facility]) [A41.9]  Sepsis, due to unspecified organism, unspecified whether acute organ dysfunction present (HCC) [A41.9]    PMH:   Past Medical History:   Diagnosis Date    Anxiety     Asthma     Atherosclerotic heart disease of native coronary artery without angina pectoris     COVID-19 01/16/2025    Dementia ([facility])     Depression     Disruptive mood dysregulation disorder     Gastroesophageal reflux disease without esophagitis     Hypokalemia     Localized edema     Pneumonia     PTSD (post-traumatic stress disorder)        Social History:  Social history   Social History     Tobacco Use    Smoking status: Never    Smokeless tobacco: Never   Substance Use Topics    Alcohol use: Not on file     Drug History   Social History     Substance and Sexual Activity   Drug Use Not on file     Familiy History History reviewed. No pertinent family history.    Consults: None    Significant Diagnostic Studies:     Discharge Condition: Fair    Disposition: SNF    Discharge Medications:   Current

## 2025-02-22 NOTE — PLAN OF CARE
Problem: Safety - Adult  Goal: Free from fall injury  Outcome: Progressing     Problem: Skin/Tissue Integrity  Goal: Skin integrity remains intact  Description: 1.  Monitor for areas of redness and/or skin breakdown  2.  Assess vascular access sites hourly  3.  Every 4-6 hours minimum:  Change oxygen saturation probe site  4.  Every 4-6 hours:  If on nasal continuous positive airway pressure, respiratory therapy assess nares and determine need for appliance change or resting period  Outcome: Progressing     Problem: Respiratory - Adult  Goal: Achieves optimal ventilation and oxygenation  2/21/2025 2017 by Donavan Whitten RN  Outcome: Progressing  2/21/2025 0756 by Kimberli Navarro, RT  Outcome: Progressing

## 2025-02-22 NOTE — CARE COORDINATION
Pt clear from CM standpoint - [facility] transport @ 1400PM.    Transition of Care Plan:    RUR: 18% \"medium risk\"  Prior Level of Functioning: Needs assistance ADL's  Disposition: Return to [facility]   Room: 323  Report: [phone]  BRIANA: 2/22  If SNF or IPR: Date FOC offered: N/A  Follow up appointments: Defer to facility  DME needed: None  Transportation at discharge: [facility] transport @ 1400PM  IM/IMM Medicare/ letter given: 2nd IM given by CM on 2/20  Is patient a [place] and connected with [facility]? No  Caregiver Contact: Mariann Cruz (spouse), [phone]  Discharge Caregiver contacted prior to discharge? Yes, CM contacted spouse  Care Conference needed? Not at this time   Barriers to discharge: None     1244 PM: Chart reviewed. [facility] ensure [facility] can [facility] today. Pt can return to [facility] and go to room 323. [facility] transport arranged for 1400PM. CM made pt spouse aware of update.     02/22/25 1336   Services At/After Discharge   Transition of Care Consult (CM Consult) Discharge Planning   Services At/After Discharge Transport;Long term care   [place] Resource Information Provided? No   Mode of Transport at Discharge BLS   Hospital Transport Time of Discharge 1400   Confirm Follow Up Transport Other (see comment)  ([facility])   Condition of Participation: Discharge Planning   The Plan for Transition of Care is related to the following treatment goals: Return to [facility]   The Patient and/or Patient Representative was provided with a Choice of Provider? Patient   The Patient and/Or Patient Representative agree with the Discharge Plan? Yes     JIE Eckert  Care Management  [facility]   [phone]

## 2025-02-22 NOTE — PLAN OF CARE
Predictive Model Details          30 (Normal)  Factor Value    Calculated 2/22/2025 08:00 42% Age 79 years old    Deterioration Index Model 31% Neurological exam X     23% Sven coma scale 14     2% Pulse 60     1% Pulse oximetry 94 %     1% Systolic 108     0% Temperature 97.5 °F (36.4 °C)     0% Respiratory rate 16        Problem: Discharge Planning  Goal: Discharge to home or other facility with appropriate resources  2/22/2025 0800 by Quynh Marion RN  Outcome: Progressing  2/21/2025 2336 by Joanne Saha RN  Outcome: Progressing  Flowsheets (Taken 2/21/2025 2030)  Discharge to home or other facility with appropriate resources: Identify barriers to discharge with patient and caregiver     Problem: Safety - Adult  Goal: Free from fall injury  2/22/2025 0800 by Quynh Marion RN  Outcome: Progressing  2/21/2025 2336 by Joanne Saha RN  Outcome: Progressing  Flowsheets (Taken 2/21/2025 2030)  Free From Fall Injury: Instruct family/caregiver on patient safety  2/21/2025 2017 by Donavan Whitten RN  Outcome: Progressing     Problem: Pain  Goal: Verbalizes/displays adequate comfort level or baseline comfort level  2/22/2025 0800 by Quynh Marion RN  Outcome: Progressing  2/21/2025 2336 by Joanne Saha RN  Outcome: Progressing  Flowsheets  Taken 2/21/2025 2314  Verbalizes/displays adequate comfort level or baseline comfort level: Encourage patient to monitor pain and request assistance  Taken 2/21/2025 2031  Verbalizes/displays adequate comfort level or baseline comfort level: Encourage patient to monitor pain and request assistance     Problem: Skin/Tissue Integrity  Goal: Skin integrity remains intact  Description: 1.  Monitor for areas of redness and/or skin breakdown  2.  Assess vascular access sites hourly  3.  Every 4-6 hours minimum:  Change oxygen saturation probe site  4.  Every 4-6 hours:  If on nasal continuous positive airway pressure, respiratory therapy assess nares and determine

## 2025-02-25 LAB — VZV IGM SER IA-ACNC: <0.91 INDEX (ref 0–0.9)

## 2025-03-16 PROBLEM — J10.1 INFLUENZA A: Status: RESOLVED | Noted: 2025-02-14 | Resolved: 2025-03-16

## 2025-03-17 PROBLEM — N39.0 UTI (URINARY TRACT INFECTION): Status: RESOLVED | Noted: 2025-02-15 | Resolved: 2025-03-17

## 2025-06-18 ENCOUNTER — APPOINTMENT (OUTPATIENT)
Facility: HOSPITAL | Age: 80
DRG: 312 | End: 2025-06-18
Payer: OTHER GOVERNMENT

## 2025-06-18 ENCOUNTER — HOSPITAL ENCOUNTER (INPATIENT)
Facility: HOSPITAL | Age: 80
LOS: 5 days | Discharge: NURSING, REHAB OR LONG-TERM CARE | DRG: 312 | End: 2025-06-23
Attending: EMERGENCY MEDICINE | Admitting: INTERNAL MEDICINE
Payer: OTHER GOVERNMENT

## 2025-06-18 DIAGNOSIS — F03.90 DEMENTIA, UNSPECIFIED DEMENTIA SEVERITY, UNSPECIFIED DEMENTIA TYPE, UNSPECIFIED WHETHER BEHAVIORAL, PSYCHOTIC, OR MOOD DISTURBANCE OR ANXIETY (HCC): ICD-10-CM

## 2025-06-18 DIAGNOSIS — R55 SYNCOPE AND COLLAPSE: Primary | ICD-10-CM

## 2025-06-18 DIAGNOSIS — S42.021D CLOSED DISPLACED FRACTURE OF SHAFT OF RIGHT CLAVICLE WITH ROUTINE HEALING, SUBSEQUENT ENCOUNTER: ICD-10-CM

## 2025-06-18 DIAGNOSIS — R55 SYNCOPE, UNSPECIFIED SYNCOPE TYPE: ICD-10-CM

## 2025-06-18 LAB
ALBUMIN SERPL-MCNC: 2.9 G/DL (ref 3.5–5)
ALBUMIN/GLOB SERPL: 0.8 (ref 1.1–2.2)
ALP SERPL-CCNC: 109 U/L (ref 45–117)
ALT SERPL-CCNC: 20 U/L (ref 12–78)
ANION GAP SERPL CALC-SCNC: 5 MMOL/L (ref 2–12)
APPEARANCE UR: CLEAR
AST SERPL-CCNC: 19 U/L (ref 15–37)
BACTERIA URNS QL MICRO: NEGATIVE /HPF
BASOPHILS # BLD: 0.03 K/UL (ref 0–0.1)
BASOPHILS NFR BLD: 0.6 % (ref 0–1)
BILIRUB SERPL-MCNC: 0.4 MG/DL (ref 0.2–1)
BILIRUB UR QL: NEGATIVE
BUN SERPL-MCNC: 16 MG/DL (ref 6–20)
BUN/CREAT SERPL: 21 (ref 12–20)
CALCIUM SERPL-MCNC: 8.7 MG/DL (ref 8.5–10.1)
CHLORIDE SERPL-SCNC: 108 MMOL/L (ref 97–108)
CO2 SERPL-SCNC: 29 MMOL/L (ref 21–32)
COLOR UR: NORMAL
COMMENT:: NORMAL
CREAT SERPL-MCNC: 0.75 MG/DL (ref 0.7–1.3)
DIFFERENTIAL METHOD BLD: ABNORMAL
EKG ATRIAL RATE: 68 BPM
EKG DIAGNOSIS: NORMAL
EKG P AXIS: 37 DEGREES
EKG P-R INTERVAL: 208 MS
EKG Q-T INTERVAL: 466 MS
EKG QRS DURATION: 100 MS
EKG QTC CALCULATION (BAZETT): 495 MS
EKG R AXIS: -38 DEGREES
EKG T AXIS: 20 DEGREES
EKG VENTRICULAR RATE: 68 BPM
EOSINOPHIL # BLD: 0.42 K/UL (ref 0–0.4)
EOSINOPHIL NFR BLD: 7.8 % (ref 0–7)
EPITH CASTS URNS QL MICRO: NORMAL /LPF
ERYTHROCYTE [DISTWIDTH] IN BLOOD BY AUTOMATED COUNT: 15.4 % (ref 11.5–14.5)
GLOBULIN SER CALC-MCNC: 3.6 G/DL (ref 2–4)
GLUCOSE BLD STRIP.AUTO-MCNC: 103 MG/DL (ref 65–117)
GLUCOSE SERPL-MCNC: 162 MG/DL (ref 65–100)
GLUCOSE UR STRIP.AUTO-MCNC: NEGATIVE MG/DL
HCT VFR BLD AUTO: 34.7 % (ref 36.6–50.3)
HGB BLD-MCNC: 10.4 G/DL (ref 12.1–17)
HGB UR QL STRIP: NEGATIVE
HYALINE CASTS URNS QL MICRO: NORMAL /LPF (ref 0–2)
IMM GRANULOCYTES # BLD AUTO: 0.03 K/UL (ref 0–0.04)
IMM GRANULOCYTES NFR BLD AUTO: 0.6 % (ref 0–0.5)
KETONES UR QL STRIP.AUTO: NEGATIVE MG/DL
LACTATE BLD-SCNC: 0.97 MMOL/L (ref 0.4–2)
LACTATE BLD-SCNC: 2.2 MMOL/L (ref 0.4–2)
LEUKOCYTE ESTERASE UR QL STRIP.AUTO: NEGATIVE
LYMPHOCYTES # BLD: 0.99 K/UL (ref 0.8–3.5)
LYMPHOCYTES NFR BLD: 18.4 % (ref 12–49)
MCH RBC QN AUTO: 29.2 PG (ref 26–34)
MCHC RBC AUTO-ENTMCNC: 30 G/DL (ref 30–36.5)
MCV RBC AUTO: 97.5 FL (ref 80–99)
MONOCYTES # BLD: 0.43 K/UL (ref 0–1)
MONOCYTES NFR BLD: 8 % (ref 5–13)
NEUTS SEG # BLD: 3.48 K/UL (ref 1.8–8)
NEUTS SEG NFR BLD: 64.6 % (ref 32–75)
NITRITE UR QL STRIP.AUTO: NEGATIVE
NRBC # BLD: 0 K/UL (ref 0–0.01)
NRBC BLD-RTO: 0 PER 100 WBC
PH UR STRIP: 6.5 (ref 5–8)
PLATELET # BLD AUTO: 281 K/UL (ref 150–400)
PMV BLD AUTO: 10.6 FL (ref 8.9–12.9)
POTASSIUM SERPL-SCNC: 3.2 MMOL/L (ref 3.5–5.1)
PROT SERPL-MCNC: 6.5 G/DL (ref 6.4–8.2)
PROT UR STRIP-MCNC: NEGATIVE MG/DL
RBC # BLD AUTO: 3.56 M/UL (ref 4.1–5.7)
RBC #/AREA URNS HPF: NORMAL /HPF (ref 0–5)
SERVICE CMNT-IMP: NORMAL
SODIUM SERPL-SCNC: 142 MMOL/L (ref 136–145)
SP GR UR REFRACTOMETRY: 1.02
SPECIMEN HOLD: NORMAL
TROPONIN I SERPL HS-MCNC: 25 NG/L (ref 0–76)
URINE CULTURE IF INDICATED: NORMAL
UROBILINOGEN UR QL STRIP.AUTO: 1 EU/DL (ref 0.2–1)
WBC # BLD AUTO: 5.4 K/UL (ref 4.1–11.1)
WBC URNS QL MICRO: NORMAL /HPF (ref 0–4)

## 2025-06-18 PROCEDURE — 95813 EEG EXTND MNTR 61-119 MIN: CPT | Performed by: PSYCHIATRY & NEUROLOGY

## 2025-06-18 PROCEDURE — 6370000000 HC RX 637 (ALT 250 FOR IP)

## 2025-06-18 PROCEDURE — 83605 ASSAY OF LACTIC ACID: CPT

## 2025-06-18 PROCEDURE — 1100000003 HC PRIVATE W/ TELEMETRY

## 2025-06-18 PROCEDURE — 71045 X-RAY EXAM CHEST 1 VIEW: CPT

## 2025-06-18 PROCEDURE — 74174 CTA ABD&PLVS W/CONTRAST: CPT

## 2025-06-18 PROCEDURE — XX20X89 MONITORING OF BRAIN ELECTRICAL ACTIVITY, COMPUTER-AIDED DETECTION AND NOTIFICATION, NEW TECHNOLOGY GROUP 9: ICD-10-PCS | Performed by: PSYCHIATRY & NEUROLOGY

## 2025-06-18 PROCEDURE — 70450 CT HEAD/BRAIN W/O DYE: CPT

## 2025-06-18 PROCEDURE — 6360000002 HC RX W HCPCS

## 2025-06-18 PROCEDURE — 85025 COMPLETE CBC W/AUTO DIFF WBC: CPT

## 2025-06-18 PROCEDURE — 82962 GLUCOSE BLOOD TEST: CPT

## 2025-06-18 PROCEDURE — 84484 ASSAY OF TROPONIN QUANT: CPT

## 2025-06-18 PROCEDURE — 80053 COMPREHEN METABOLIC PANEL: CPT

## 2025-06-18 PROCEDURE — 99285 EMERGENCY DEPT VISIT HI MDM: CPT

## 2025-06-18 PROCEDURE — 36415 COLL VENOUS BLD VENIPUNCTURE: CPT

## 2025-06-18 PROCEDURE — 2580000003 HC RX 258: Performed by: EMERGENCY MEDICINE

## 2025-06-18 PROCEDURE — 2500000003 HC RX 250 WO HCPCS

## 2025-06-18 PROCEDURE — 81001 URINALYSIS AUTO W/SCOPE: CPT

## 2025-06-18 PROCEDURE — 6360000004 HC RX CONTRAST MEDICATION: Performed by: EMERGENCY MEDICINE

## 2025-06-18 PROCEDURE — 93005 ELECTROCARDIOGRAM TRACING: CPT | Performed by: EMERGENCY MEDICINE

## 2025-06-18 RX ORDER — SODIUM CHLORIDE 0.9 % (FLUSH) 0.9 %
5-40 SYRINGE (ML) INJECTION PRN
Status: DISCONTINUED | OUTPATIENT
Start: 2025-06-18 | End: 2025-06-23 | Stop reason: HOSPADM

## 2025-06-18 RX ORDER — SODIUM CHLORIDE 9 MG/ML
INJECTION, SOLUTION INTRAVENOUS PRN
Status: DISCONTINUED | OUTPATIENT
Start: 2025-06-18 | End: 2025-06-23 | Stop reason: HOSPADM

## 2025-06-18 RX ORDER — ASCORBIC ACID 500 MG
500 TABLET ORAL DAILY
Status: DISCONTINUED | OUTPATIENT
Start: 2025-06-19 | End: 2025-06-23 | Stop reason: HOSPADM

## 2025-06-18 RX ORDER — ACETAMINOPHEN 650 MG/1
650 SUPPOSITORY RECTAL EVERY 6 HOURS PRN
Status: DISCONTINUED | OUTPATIENT
Start: 2025-06-18 | End: 2025-06-23 | Stop reason: HOSPADM

## 2025-06-18 RX ORDER — MIDODRINE HYDROCHLORIDE 5 MG/1
10 TABLET ORAL
Status: DISCONTINUED | OUTPATIENT
Start: 2025-06-18 | End: 2025-06-23 | Stop reason: HOSPADM

## 2025-06-18 RX ORDER — GUAIFENESIN 600 MG/1
600 TABLET, EXTENDED RELEASE ORAL 2 TIMES DAILY PRN
COMMUNITY

## 2025-06-18 RX ORDER — MAGNESIUM SULFATE IN WATER 40 MG/ML
2000 INJECTION, SOLUTION INTRAVENOUS PRN
Status: DISCONTINUED | OUTPATIENT
Start: 2025-06-18 | End: 2025-06-20

## 2025-06-18 RX ORDER — ONDANSETRON 2 MG/ML
4 INJECTION INTRAMUSCULAR; INTRAVENOUS EVERY 6 HOURS PRN
Status: DISCONTINUED | OUTPATIENT
Start: 2025-06-18 | End: 2025-06-23 | Stop reason: HOSPADM

## 2025-06-18 RX ORDER — HYDROXYZINE HYDROCHLORIDE 50 MG/ML
25 INJECTION, SOLUTION INTRAMUSCULAR 3 TIMES DAILY PRN
Status: DISCONTINUED | OUTPATIENT
Start: 2025-06-18 | End: 2025-06-23 | Stop reason: HOSPADM

## 2025-06-18 RX ORDER — LORAZEPAM 2 MG/ML
1 CONCENTRATE ORAL ONCE
Status: DISCONTINUED | OUTPATIENT
Start: 2025-06-18 | End: 2025-06-18

## 2025-06-18 RX ORDER — M-VIT,TX,IRON,MINS/CALC/FOLIC 27MG-0.4MG
1 TABLET ORAL DAILY
Status: DISCONTINUED | OUTPATIENT
Start: 2025-06-19 | End: 2025-06-23 | Stop reason: HOSPADM

## 2025-06-18 RX ORDER — OLANZAPINE 5 MG/1
2.5 TABLET, FILM COATED ORAL 2 TIMES DAILY
Status: DISCONTINUED | OUTPATIENT
Start: 2025-06-18 | End: 2025-06-23 | Stop reason: HOSPADM

## 2025-06-18 RX ORDER — ZINC SULFATE 50(220)MG
50 CAPSULE ORAL DAILY
Status: DISCONTINUED | OUTPATIENT
Start: 2025-06-19 | End: 2025-06-23 | Stop reason: HOSPADM

## 2025-06-18 RX ORDER — DIAZEPAM 10 MG/2ML
5 INJECTION, SOLUTION INTRAMUSCULAR; INTRAVENOUS ONCE
Status: COMPLETED | OUTPATIENT
Start: 2025-06-18 | End: 2025-06-20

## 2025-06-18 RX ORDER — POTASSIUM CHLORIDE 1500 MG/1
20 TABLET, EXTENDED RELEASE ORAL ONCE
Status: COMPLETED | OUTPATIENT
Start: 2025-06-18 | End: 2025-06-18

## 2025-06-18 RX ORDER — IOPAMIDOL 755 MG/ML
100 INJECTION, SOLUTION INTRAVASCULAR
Status: COMPLETED | OUTPATIENT
Start: 2025-06-18 | End: 2025-06-18

## 2025-06-18 RX ORDER — POTASSIUM CHLORIDE 7.45 MG/ML
10 INJECTION INTRAVENOUS PRN
Status: DISCONTINUED | OUTPATIENT
Start: 2025-06-18 | End: 2025-06-20

## 2025-06-18 RX ORDER — AMMONIUM LACTATE 12 G/100G
1 LOTION TOPICAL DAILY
Status: DISCONTINUED | OUTPATIENT
Start: 2025-06-19 | End: 2025-06-23 | Stop reason: HOSPADM

## 2025-06-18 RX ORDER — ENOXAPARIN SODIUM 100 MG/ML
30 INJECTION SUBCUTANEOUS 2 TIMES DAILY
Status: DISCONTINUED | OUTPATIENT
Start: 2025-06-18 | End: 2025-06-23 | Stop reason: HOSPADM

## 2025-06-18 RX ORDER — ONDANSETRON 4 MG/1
4 TABLET, ORALLY DISINTEGRATING ORAL EVERY 8 HOURS PRN
Status: DISCONTINUED | OUTPATIENT
Start: 2025-06-18 | End: 2025-06-23 | Stop reason: HOSPADM

## 2025-06-18 RX ORDER — POLYETHYLENE GLYCOL 3350 17 G/17G
17 POWDER, FOR SOLUTION ORAL DAILY PRN
Status: DISCONTINUED | OUTPATIENT
Start: 2025-06-18 | End: 2025-06-23 | Stop reason: HOSPADM

## 2025-06-18 RX ORDER — PANTOPRAZOLE SODIUM 40 MG/1
40 TABLET, DELAYED RELEASE ORAL
Status: DISCONTINUED | OUTPATIENT
Start: 2025-06-19 | End: 2025-06-23 | Stop reason: HOSPADM

## 2025-06-18 RX ORDER — ACETAMINOPHEN 325 MG/1
650 TABLET ORAL EVERY 6 HOURS PRN
Status: DISCONTINUED | OUTPATIENT
Start: 2025-06-18 | End: 2025-06-23 | Stop reason: HOSPADM

## 2025-06-18 RX ORDER — 0.9 % SODIUM CHLORIDE 0.9 %
500 INTRAVENOUS SOLUTION INTRAVENOUS ONCE
Status: COMPLETED | OUTPATIENT
Start: 2025-06-18 | End: 2025-06-18

## 2025-06-18 RX ORDER — ASPIRIN 81 MG/1
81 TABLET, CHEWABLE ORAL DAILY
Status: DISCONTINUED | OUTPATIENT
Start: 2025-06-19 | End: 2025-06-23 | Stop reason: HOSPADM

## 2025-06-18 RX ORDER — DONEPEZIL HYDROCHLORIDE 5 MG/1
10 TABLET, FILM COATED ORAL EVERY EVENING
Status: DISCONTINUED | OUTPATIENT
Start: 2025-06-18 | End: 2025-06-18

## 2025-06-18 RX ORDER — ASPIRIN 81 MG/1
81 TABLET, CHEWABLE ORAL DAILY
COMMUNITY

## 2025-06-18 RX ORDER — SODIUM CHLORIDE 0.9 % (FLUSH) 0.9 %
5-40 SYRINGE (ML) INJECTION EVERY 12 HOURS SCHEDULED
Status: DISCONTINUED | OUTPATIENT
Start: 2025-06-18 | End: 2025-06-23 | Stop reason: HOSPADM

## 2025-06-18 RX ORDER — ACETAMINOPHEN 325 MG/1
650 TABLET ORAL EVERY 6 HOURS PRN
Status: DISCONTINUED | OUTPATIENT
Start: 2025-06-18 | End: 2025-06-18 | Stop reason: SDUPTHER

## 2025-06-18 RX ORDER — CALCIUM CARBONATE 500 MG/1
1 TABLET, CHEWABLE ORAL 2 TIMES DAILY
Status: DISCONTINUED | OUTPATIENT
Start: 2025-06-18 | End: 2025-06-23 | Stop reason: HOSPADM

## 2025-06-18 RX ORDER — DIVALPROEX SODIUM 125 MG/1
125 CAPSULE, COATED PELLETS ORAL 2 TIMES DAILY
Status: DISCONTINUED | OUTPATIENT
Start: 2025-06-18 | End: 2025-06-23 | Stop reason: HOSPADM

## 2025-06-18 RX ORDER — GUAIFENESIN 600 MG/1
600 TABLET, EXTENDED RELEASE ORAL 2 TIMES DAILY PRN
Status: DISCONTINUED | OUTPATIENT
Start: 2025-06-18 | End: 2025-06-23 | Stop reason: HOSPADM

## 2025-06-18 RX ORDER — POTASSIUM CHLORIDE 1500 MG/1
40 TABLET, EXTENDED RELEASE ORAL PRN
Status: DISCONTINUED | OUTPATIENT
Start: 2025-06-18 | End: 2025-06-20

## 2025-06-18 RX ADMIN — SODIUM CHLORIDE 500 ML: 0.9 INJECTION, SOLUTION INTRAVENOUS at 11:10

## 2025-06-18 RX ADMIN — OLANZAPINE 2.5 MG: 5 TABLET, FILM COATED ORAL at 20:38

## 2025-06-18 RX ADMIN — IOPAMIDOL 100 ML: 755 INJECTION, SOLUTION INTRAVENOUS at 12:58

## 2025-06-18 RX ADMIN — CALCIUM CARBONATE (ANTACID) CHEW TAB 500 MG 500 MG: 500 CHEW TAB at 20:39

## 2025-06-18 RX ADMIN — MELATONIN 3 MG: at 20:39

## 2025-06-18 RX ADMIN — DIVALPROEX SODIUM 125 MG: 125 CAPSULE, COATED PELLETS ORAL at 20:39

## 2025-06-18 RX ADMIN — Medication 1 TABLET: at 20:39

## 2025-06-18 RX ADMIN — SODIUM CHLORIDE, PRESERVATIVE FREE 5 ML: 5 INJECTION INTRAVENOUS at 20:39

## 2025-06-18 RX ADMIN — ENOXAPARIN SODIUM 30 MG: 100 INJECTION SUBCUTANEOUS at 20:39

## 2025-06-18 RX ADMIN — POTASSIUM CHLORIDE 20 MEQ: 1500 TABLET, EXTENDED RELEASE ORAL at 18:57

## 2025-06-18 RX ADMIN — MIDODRINE HYDROCHLORIDE 10 MG: 5 TABLET ORAL at 18:54

## 2025-06-18 RX ADMIN — ACETAMINOPHEN 650 MG: 325 TABLET ORAL at 20:39

## 2025-06-18 ASSESSMENT — PAIN SCALES - GENERAL: PAINLEVEL_OUTOF10: 10

## 2025-06-18 ASSESSMENT — PAIN DESCRIPTION - FREQUENCY: FREQUENCY: CONTINUOUS

## 2025-06-18 ASSESSMENT — PAIN DESCRIPTION - DESCRIPTORS: DESCRIPTORS: ACHING

## 2025-06-18 ASSESSMENT — PAIN - FUNCTIONAL ASSESSMENT: PAIN_FUNCTIONAL_ASSESSMENT: ACTIVITIES ARE NOT PREVENTED

## 2025-06-18 ASSESSMENT — PAIN DESCRIPTION - PAIN TYPE: TYPE: ACUTE PAIN

## 2025-06-18 ASSESSMENT — PAIN DESCRIPTION - LOCATION: LOCATION: CHEST

## 2025-06-18 ASSESSMENT — PAIN DESCRIPTION - ORIENTATION: ORIENTATION: MID

## 2025-06-18 ASSESSMENT — PAIN DESCRIPTION - ONSET: ONSET: ON-GOING

## 2025-06-18 NOTE — SIGNIFICANT EVENT
PATIENT WAS CALLING TO SEE IF THERE WOULD BE ANYWAY SHE COULD GET HER MEDICATION CALLED INTO HER PHARMACY IN [place].     PATIENT PACKED TWO WEEKS WORTH OF ALL OF HER MEDICATIONS AND NOW SHE'S DOWN TO HER LAST DAY.     PATIENT STATED HER MOVING COMPANY IS DELAYING HER DELIVERY UNTIL MAY 9TH. AND SHE HAS NO MEDICATION LEFT IT WAS ORIGINALLY SUPPOSED TO BE DELIVERED April 18TH     PLEASE ADVISE AND CALL PATIENT [phone]    PATIENT CONFIRMED [id number] - [place] [address] AT [address] OF [address] - [phone] [place] [phone] FX    Rapid Called at 1740    Responded to RRT at 1740 for Altered mental status    Provider at bedside: Yes  Interventions ordered: 5mg valium  Sepsis Suspected: No  Transfer to Higher Level of Care: na  Blood Glucose: 102     Situation  Pt was having seizure like activities. RRT was called.     Background  Syncope and collapse, dementia.     Assessment  Pt was having seizure like activities. Pt went back to alert after several mins. VSS.     Intervention/Recommendation  5mg valium when needed, neuro consult     Reevaluation      RRT verbally is released by primary RN.       Visit Vitals  /71   Pulse 72   Temp 98.4 °F (36.9 °C) (Oral)   Resp 16   Ht 1.829 m (6')   Wt 113.4 kg (250 lb)   SpO2 98%   BMI 33.91 kg/m²        Rapid Ended at 1815  RRT RN assisted with transport to accepting unit JOVAN Szymanski RN

## 2025-06-18 NOTE — ED NOTES
Headband: removed  Date/Time: 6/15/25 1626  Recorder: recording stopped  Skin: intact  Highest Seizure Bellflower Percentage past hour: 0%      General info regarding Seizure Bellflower %:  Minimum duration of study is 2 hours. If Seizure Bellflower has remained 0% throughout the entire 2-hour duration, communicate with provider to stop the recording.     Seizure Bellflower 0-10% - Continue to monitor and complete 2-hour study.  Seizure Bellflower 11-89% - Epileptiform activity present. Notify provider for next steps.  Seizure Bellflower >/= 90% - Epileptiform activity consistent with Status Epilepticus. Immediately notify provider.     *Patients with Seizure Bellflower above 10% that persists may require a study longer than 2 hours. Maximum recording duration is 24 hours. Please update provider with a persistent increase in Seizure Bellflower above 10%.

## 2025-06-18 NOTE — H&P
History and physical      Demographics    Patient Name  Josue Cruz   Date of Birth 1945   Medical Record Number  [id number]    Age  79 y.o.   PCP No primary care provider on file.   Admit date:  6/18/2025 10:19 AM   Room Number  ER20/20    [facility]      Admission Diagnosis:  Syncope and collapse       Certification: We are admitting Josue Cruz 79 y.o. male with a principle diagnosis of Syncope and collapse  This patient also suffers from other comorbidities listed below. I have a high level of concern for decompensation leading to life threatening complications.     Assessment and plan:     Multiple syncopal events, POA  Aortic stenosis, POA  U/A - unremarkable   CT head - no acute intracranial abnormality  CTA chest abdomen pelvis - ascending aortic aneurysm not significantly changed with no evidence for aortic dissection, bibasilar atelectasis with small pleural effusions, borderline enlarged mediastinal lymph nodes  Admit to telemetry  Ceribell study in progress at the time of my evaluation  Cardiology consulted, appreciate expertise and recommendations. Follows with [facility] cardiologists   TTE  Carotid duplex     Dementia, POA  Mood disorder, POA  Risk for hospital-associated delirium   Continue divalproex, olanzapine  Delirium precautions  PRN hydroxyzine for agitation     Hypokalemia, POA  Lactic acidosis, POA  Lactic 2.20 > 0.97  Potassium 3.2   Replete potassium  Recheck lactic acid in AM     CAD with history of MI (1999)  Diastolic heart failure  History of DVT s/p IVC filter  Continue ASA    Right clavicle fracture, POA  Multiple right rib fractures, POA   CT chest abdomen pelvis - multiple subacute appearing right lateral rib fractures  CXR - acute right clavicle fracture, no evidence of acute cardiopulmonary process   Fall precautions    Diabetes mellitus type 2   No antihyperglycemic medications on file  Initiate AccuChecks, consider SSI if glucose is

## 2025-06-18 NOTE — ED NOTES
Assisted pt to use urinal at this time. Pt had BM. Pt cleaned with new brief and sheets at this time.

## 2025-06-18 NOTE — ED PROVIDER NOTES
[facility] EMERGENCY DEPARTMENT  EMERGENCY DEPARTMENT ENCOUNTER    Patient Name: Josue Cruz  MRN: [id number]  YOB: 1945  Provider: Rick Edwards MD  PCP: No primary care provider on file. ([facility])  Time/Date of evaluation: 10:42 AM EDT on 6/18/25    History of Presenting Illness     Chief Complaint   Patient presents with    Loss of Consciousness     Pt BIBEMS from [facility] and rehab with reports of 6+ syncopy episodes this morning. Pt had syncopal episode for EMS and sternal rub woke him up. VSS en route. Pt endorses dizziness and a \"crushing headache\", chest pain, and shortness of breath. Pt states \"I brought the chest pain and shortness of breath home from [place] with me\".        History Provided by: EMS, Patient, Patient's Wife, and Patient's Daughter   History is limited by: Dementia    HISTORY (Narrative):   Josue Cruz is a 79 y.o. male with a PMHX of dementia, disruptive mood dysregulation disorder, anxiety, PTSD, CAD, and GERDwho presents to the emergency department (room 20) by EMS C/O multiple syncopal events that have occurred over the past 5 hours.  Per family, they have never witnessed any of these events in the past.  Family denies any new medications or recent illness.  Patient denies any lightheadedness, dizziness, chest pain, or shortness of breath.  He does report a headache down the center of his head but cannot tell me how long the symptoms have been present.    Nursing Notes were all reviewed and agreed with or any disagreements were addressed in the HPI.    Past History     PAST MEDICAL HISTORY:  Past Medical History:   Diagnosis Date    Anxiety     Asthma     Atherosclerotic heart disease of native coronary artery without angina pectoris     COVID-19 01/16/2025    Dementia (HCC)     Depression     Disruptive mood dysregulation disorder     Gastroesophageal reflux disease without esophagitis     Hypokalemia     Localized edema     Pneumonia

## 2025-06-18 NOTE — ED NOTES
Saved EKG strip into chart for hospitalist to review. Hospitalist aware and states will review strip.

## 2025-06-18 NOTE — PROGRESS NOTES
Pharmacy Medication History Review    Medication history obtained by Steven Maria RPH while patient was in room ER20/20 and was completed based on information available during current patient encounter. Medication history was completed before home meds were reconciled by provider.    PTA medication list was corrected to the following:   Prior to Admission Medications   Prescriptions Last Dose Informant   Calcium Carbonate-Vitamin D (OYSTER SHELL CALCIUM/D) 500-5 MG-MCG TABS 6/18/2025 Transfer Papers/EMS   Sig: Take 1 tablet by mouth 2 times daily   Multiple Vitamin (MULTIVITAMIN) TABS tablet 6/18/2025 Transfer Papers/EMS   Sig: Take 1 tablet by mouth daily   OLANZapine (ZYPREXA) 5 MG tablet 6/18/2025 Transfer Papers/EMS   Sig: Take 0.5 tablets by mouth in the morning and at bedtime   UNABLE TO FIND 6/18/2025 Transfer Papers/EMS   Sig: Apply 1 Application topically in the morning and at bedtime Freedman goo cream  Apply to buttocks and groin topically every shift for redness or irriation   acetaminophen (TYLENOL) 325 MG tablet Past Month Transfer Papers/EMS   Sig: Take 2 tablets by mouth every 6 hours as needed for Pain or Fever   ammonium lactate (LAC-HYDRIN) 5 % LOTN lotion 6/18/2025 Transfer Papers/EMS   Sig: Apply 1 each topically daily Apply to dry skin topically every day shift for dry skin   ascorbic acid (VITAMIN C) 500 MG tablet 6/18/2025 Transfer Papers/EMS   Sig: Take 1 tablet by mouth daily   aspirin 81 MG chewable tablet 6/18/2025 Transfer Papers/EMS   Sig: Take 1 tablet by mouth daily   calcium carbonate (TUMS) 500 MG chewable tablet 6/18/2025 Transfer Papers/EMS   Sig: Take 1 tablet by mouth 2 times daily   divalproex (DEPAKOTE SPRINKLE) 125 MG DR capsule 6/18/2025 Transfer Papers/EMS   Sig: Take 1 capsule by mouth 2 times daily   donepezil (ARICEPT) 10 MG tablet 6/17/2025 Transfer Papers/EMS   Sig: Take 1 tablet by mouth every evening   guaiFENesin (MUCINEX) 600 MG extended release tablet Past Month

## 2025-06-18 NOTE — PROGRESS NOTES
RRT called for seizures. STATS 96% Room air HR 89 VS stable Respirtory at bedside and not needed at this time.

## 2025-06-18 NOTE — ED NOTES
Headband: applied  Date/Time: 6/18/25 1440  Recorder: recording started  Skin: no abnormalities         General info regarding Seizure Buxton %:  Minimum duration of study is 2 hours. If Seizure Buxton has remained 0% throughout the entire 2-hour duration, communicate with provider to stop the recording.     Seizure Buxton 0-10% - Continue to monitor and complete 2-hour study.  Seizure Buxton 11-89% - Epileptiform activity present. Notify provider for next steps.  Seizure Buxton >/= 90% - Epileptiform activity consistent with Status Epilepticus. Immediately notify provider.     *Patients with Seizure Buxton above 10% that persists may require a study longer than 2 hours. Maximum recording duration is 24 hours. Please update provider with a persistent increase in Seizure Buxton above 10%.

## 2025-06-19 ENCOUNTER — APPOINTMENT (OUTPATIENT)
Facility: HOSPITAL | Age: 80
DRG: 312 | End: 2025-06-19
Payer: OTHER GOVERNMENT

## 2025-06-19 PROBLEM — I65.23 BILATERAL CAROTID ARTERY STENOSIS: Status: ACTIVE | Noted: 2025-06-19

## 2025-06-19 PROBLEM — R41.82 ACUTE ALTERATION IN MENTAL STATUS: Status: ACTIVE | Noted: 2025-06-19

## 2025-06-19 LAB
ANION GAP SERPL CALC-SCNC: 5 MMOL/L (ref 2–12)
BASOPHILS # BLD: 0.03 K/UL (ref 0–0.1)
BASOPHILS NFR BLD: 0.6 % (ref 0–1)
BUN SERPL-MCNC: 17 MG/DL (ref 6–20)
BUN/CREAT SERPL: 31 (ref 12–20)
CALCIUM SERPL-MCNC: 8.6 MG/DL (ref 8.5–10.1)
CHLORIDE SERPL-SCNC: 109 MMOL/L (ref 97–108)
CO2 SERPL-SCNC: 29 MMOL/L (ref 21–32)
CREAT SERPL-MCNC: 0.55 MG/DL (ref 0.7–1.3)
DIFFERENTIAL METHOD BLD: ABNORMAL
ECHO BSA: 2.4 M2
EKG ATRIAL RATE: 71 BPM
EKG DIAGNOSIS: NORMAL
EKG P AXIS: 17 DEGREES
EKG P-R INTERVAL: 214 MS
EKG Q-T INTERVAL: 462 MS
EKG QRS DURATION: 98 MS
EKG QTC CALCULATION (BAZETT): 502 MS
EKG R AXIS: -41 DEGREES
EKG T AXIS: 18 DEGREES
EKG VENTRICULAR RATE: 71 BPM
EOSINOPHIL # BLD: 0.59 K/UL (ref 0–0.4)
EOSINOPHIL NFR BLD: 11.5 % (ref 0–7)
ERYTHROCYTE [DISTWIDTH] IN BLOOD BY AUTOMATED COUNT: 15.3 % (ref 11.5–14.5)
GLUCOSE BLD STRIP.AUTO-MCNC: 110 MG/DL (ref 65–117)
GLUCOSE BLD STRIP.AUTO-MCNC: 122 MG/DL (ref 65–117)
GLUCOSE BLD STRIP.AUTO-MCNC: 99 MG/DL (ref 65–117)
GLUCOSE SERPL-MCNC: 70 MG/DL (ref 65–100)
HCT VFR BLD AUTO: 32 % (ref 36.6–50.3)
HGB BLD-MCNC: 9.4 G/DL (ref 12.1–17)
IMM GRANULOCYTES # BLD AUTO: 0.01 K/UL (ref 0–0.04)
IMM GRANULOCYTES NFR BLD AUTO: 0.2 % (ref 0–0.5)
LACTATE SERPL-SCNC: 0.7 MMOL/L (ref 0.4–2)
LYMPHOCYTES # BLD: 1.27 K/UL (ref 0.8–3.5)
LYMPHOCYTES NFR BLD: 24.8 % (ref 12–49)
MAGNESIUM SERPL-MCNC: 1.9 MG/DL (ref 1.6–2.4)
MCH RBC QN AUTO: 28.5 PG (ref 26–34)
MCHC RBC AUTO-ENTMCNC: 29.4 G/DL (ref 30–36.5)
MCV RBC AUTO: 97 FL (ref 80–99)
MONOCYTES # BLD: 0.56 K/UL (ref 0–1)
MONOCYTES NFR BLD: 10.9 % (ref 5–13)
NEUTS SEG # BLD: 2.66 K/UL (ref 1.8–8)
NEUTS SEG NFR BLD: 52 % (ref 32–75)
NRBC # BLD: 0 K/UL (ref 0–0.01)
NRBC BLD-RTO: 0 PER 100 WBC
PLATELET # BLD AUTO: 248 K/UL (ref 150–400)
PMV BLD AUTO: 10.7 FL (ref 8.9–12.9)
POTASSIUM SERPL-SCNC: 3.5 MMOL/L (ref 3.5–5.1)
RBC # BLD AUTO: 3.3 M/UL (ref 4.1–5.7)
SERVICE CMNT-IMP: ABNORMAL
SERVICE CMNT-IMP: NORMAL
SERVICE CMNT-IMP: NORMAL
SODIUM SERPL-SCNC: 143 MMOL/L (ref 136–145)
VAS LEFT CCA DIST EDV: 9.4 CM/S
VAS LEFT CCA DIST PSV: 41.3 CM/S
VAS LEFT CCA PROX EDV: 0 CM/S
VAS LEFT CCA PROX PSV: 80.2 CM/S
VAS LEFT ECA EDV: 0 CM/S
VAS LEFT ECA PSV: 50.4 CM/S
VAS LEFT ICA DIST EDV: 13.6 CM/S
VAS LEFT ICA DIST PSV: 47.6 CM/S
VAS LEFT ICA MID EDV: 12.7 CM/S
VAS LEFT ICA MID PSV: 52 CM/S
VAS LEFT ICA PROX EDV: 11 CM/S
VAS LEFT ICA PROX PSV: 38 CM/S
VAS LEFT ICA/CCA PSV: 1.3 NO UNITS
VAS LEFT SUBCLAVIAN PROX EDV: 15.8 CM/S
VAS LEFT SUBCLAVIAN PROX PSV: 90.2 CM/S
VAS LEFT VERTEBRAL EDV: 12.7 CM/S
VAS LEFT VERTEBRAL PSV: 43.3 CM/S
VAS RIGHT CCA DIST EDV: 0 CM/S
VAS RIGHT CCA DIST PSV: 48.2 CM/S
VAS RIGHT CCA PROX EDV: 0 CM/S
VAS RIGHT CCA PROX PSV: 71.1 CM/S
VAS RIGHT ECA EDV: 0 CM/S
VAS RIGHT ECA PSV: 60.3 CM/S
VAS RIGHT ICA DIST EDV: 11.3 CM/S
VAS RIGHT ICA DIST PSV: 42.7 CM/S
VAS RIGHT ICA MID EDV: 11.9 CM/S
VAS RIGHT ICA MID PSV: 51.1 CM/S
VAS RIGHT ICA PROX EDV: 6 CM/S
VAS RIGHT ICA PROX PSV: 16.5 CM/S
VAS RIGHT ICA/CCA PSV: 1.1 NO UNITS
VAS RIGHT SUBCLAVIAN PROX EDV: 0 CM/S
VAS RIGHT SUBCLAVIAN PROX PSV: 109.5 CM/S
VAS RIGHT VERTEBRAL EDV: 11.6 CM/S
VAS RIGHT VERTEBRAL PSV: 51.2 CM/S
WBC # BLD AUTO: 5.1 K/UL (ref 4.1–11.1)

## 2025-06-19 PROCEDURE — 2500000003 HC RX 250 WO HCPCS

## 2025-06-19 PROCEDURE — 93880 EXTRACRANIAL BILAT STUDY: CPT | Performed by: PSYCHIATRY & NEUROLOGY

## 2025-06-19 PROCEDURE — 6370000000 HC RX 637 (ALT 250 FOR IP): Performed by: INTERNAL MEDICINE

## 2025-06-19 PROCEDURE — 95819 EEG AWAKE AND ASLEEP: CPT

## 2025-06-19 PROCEDURE — 97530 THERAPEUTIC ACTIVITIES: CPT

## 2025-06-19 PROCEDURE — 83605 ASSAY OF LACTIC ACID: CPT

## 2025-06-19 PROCEDURE — 1100000003 HC PRIVATE W/ TELEMETRY

## 2025-06-19 PROCEDURE — 95819 EEG AWAKE AND ASLEEP: CPT | Performed by: PSYCHIATRY & NEUROLOGY

## 2025-06-19 PROCEDURE — 97535 SELF CARE MNGMENT TRAINING: CPT | Performed by: OCCUPATIONAL THERAPIST

## 2025-06-19 PROCEDURE — 6360000002 HC RX W HCPCS

## 2025-06-19 PROCEDURE — 83735 ASSAY OF MAGNESIUM: CPT

## 2025-06-19 PROCEDURE — 36415 COLL VENOUS BLD VENIPUNCTURE: CPT

## 2025-06-19 PROCEDURE — 97161 PT EVAL LOW COMPLEX 20 MIN: CPT

## 2025-06-19 PROCEDURE — 6370000000 HC RX 637 (ALT 250 FOR IP)

## 2025-06-19 PROCEDURE — 4A03X5D MEASUREMENT OF ARTERIAL FLOW, INTRACRANIAL, EXTERNAL APPROACH: ICD-10-PCS | Performed by: PSYCHIATRY & NEUROLOGY

## 2025-06-19 PROCEDURE — 80048 BASIC METABOLIC PNL TOTAL CA: CPT

## 2025-06-19 PROCEDURE — 95957 EEG DIGITAL ANALYSIS: CPT

## 2025-06-19 PROCEDURE — 82962 GLUCOSE BLOOD TEST: CPT

## 2025-06-19 PROCEDURE — 70496 CT ANGIOGRAPHY HEAD: CPT

## 2025-06-19 PROCEDURE — 97165 OT EVAL LOW COMPLEX 30 MIN: CPT | Performed by: OCCUPATIONAL THERAPIST

## 2025-06-19 PROCEDURE — 93880 EXTRACRANIAL BILAT STUDY: CPT

## 2025-06-19 PROCEDURE — 85025 COMPLETE CBC W/AUTO DIFF WBC: CPT

## 2025-06-19 RX ADMIN — CALCIUM CARBONATE (ANTACID) CHEW TAB 500 MG 500 MG: 500 CHEW TAB at 09:15

## 2025-06-19 RX ADMIN — CALCIUM CARBONATE (ANTACID) CHEW TAB 500 MG 500 MG: 500 CHEW TAB at 20:04

## 2025-06-19 RX ADMIN — Medication 1 TABLET: at 09:15

## 2025-06-19 RX ADMIN — MIDODRINE HYDROCHLORIDE 10 MG: 5 TABLET ORAL at 12:08

## 2025-06-19 RX ADMIN — OLANZAPINE 2.5 MG: 5 TABLET, FILM COATED ORAL at 20:04

## 2025-06-19 RX ADMIN — DIVALPROEX SODIUM 125 MG: 125 CAPSULE, COATED PELLETS ORAL at 20:04

## 2025-06-19 RX ADMIN — PANTOPRAZOLE SODIUM 40 MG: 40 TABLET, DELAYED RELEASE ORAL at 06:20

## 2025-06-19 RX ADMIN — OXYCODONE HYDROCHLORIDE AND ACETAMINOPHEN 500 MG: 500 TABLET ORAL at 09:15

## 2025-06-19 RX ADMIN — ZINC SULFATE 220 MG (50 MG) CAPSULE 50 MG: CAPSULE at 09:15

## 2025-06-19 RX ADMIN — MIDODRINE HYDROCHLORIDE 10 MG: 5 TABLET ORAL at 18:22

## 2025-06-19 RX ADMIN — ENOXAPARIN SODIUM 30 MG: 100 INJECTION SUBCUTANEOUS at 09:15

## 2025-06-19 RX ADMIN — ACETAMINOPHEN 650 MG: 325 TABLET ORAL at 20:04

## 2025-06-19 RX ADMIN — DIVALPROEX SODIUM 125 MG: 125 CAPSULE, COATED PELLETS ORAL at 09:15

## 2025-06-19 RX ADMIN — ENOXAPARIN SODIUM 30 MG: 100 INJECTION SUBCUTANEOUS at 20:04

## 2025-06-19 RX ADMIN — Medication 1 BOTTLE: at 08:58

## 2025-06-19 RX ADMIN — MIDODRINE HYDROCHLORIDE 10 MG: 5 TABLET ORAL at 09:15

## 2025-06-19 RX ADMIN — SODIUM CHLORIDE, PRESERVATIVE FREE 10 ML: 5 INJECTION INTRAVENOUS at 09:18

## 2025-06-19 RX ADMIN — Medication 1 TABLET: at 20:04

## 2025-06-19 RX ADMIN — HYDROXYZINE HYDROCHLORIDE 25 MG: 50 INJECTION, SOLUTION INTRAMUSCULAR at 18:30

## 2025-06-19 RX ADMIN — OLANZAPINE 2.5 MG: 5 TABLET, FILM COATED ORAL at 09:15

## 2025-06-19 RX ADMIN — ACETAMINOPHEN 650 MG: 325 TABLET ORAL at 12:08

## 2025-06-19 RX ADMIN — SODIUM CHLORIDE, PRESERVATIVE FREE 10 ML: 5 INJECTION INTRAVENOUS at 20:04

## 2025-06-19 RX ADMIN — MELATONIN 3 MG: at 17:41

## 2025-06-19 RX ADMIN — ASPIRIN 81 MG: 81 TABLET, CHEWABLE ORAL at 09:15

## 2025-06-19 ASSESSMENT — PAIN SCALES - GENERAL
PAINLEVEL_OUTOF10: 0
PAINLEVEL_OUTOF10: 3

## 2025-06-19 ASSESSMENT — PAIN SCALES - PAIN ASSESSMENT IN ADVANCED DEMENTIA (PAINAD)
BODYLANGUAGE: TENSE, DISTRESSED PACING, FIDGETING
BREATHING: NORMAL
CONSOLABILITY: DISTRACTED OR REASSURED BY VOICE/TOUCH
FACIALEXPRESSION: SMILING OR INEXPRESSIVE
NEGVOCALIZATION: OCCASIONAL MOAN/GROAN, LOW SPEECH, NEGATIVE/DISAPPROVING QUALITY
TOTALSCORE: 3

## 2025-06-19 ASSESSMENT — PAIN DESCRIPTION - LOCATION: LOCATION: HEAD

## 2025-06-19 ASSESSMENT — PAIN DESCRIPTION - DESCRIPTORS: DESCRIPTORS: ACHING

## 2025-06-19 ASSESSMENT — PAIN - FUNCTIONAL ASSESSMENT: PAIN_FUNCTIONAL_ASSESSMENT: ACTIVITIES ARE NOT PREVENTED

## 2025-06-19 NOTE — CONSULTS
CONSULT - Neurology      Name:  Josue Cruz       MRN: [id number]  Location: 3213/01    Date: 6/19/2025  Time:  9:27 AM        Chief Complaint:   Chief Complaint   Patient presents with    Loss of Consciousness     Pt BIBEMS from [facility] and rehab with reports of 6+ syncopy episodes this morning. Pt had syncopal episode for EMS and sternal rub woke him up. VSS en route. Pt endorses dizziness and a \"crushing headache\", chest pain, and shortness of breath. Pt states \"I brought the chest pain and shortness of breath home from [place] with me\".          HPI:  It is a great pleasure to see Josue Cruz, a 79 y.o. male today in the hospital . Briefly these are the events happened as per the chart H&P and taken from the chart. The patient experienced six syncopal episodes, each lasting between 30 seconds and 3 minutes, with full return to baseline mentation in between these events. His family also reports he may have had a similar episode last month, which his wife initially mistook for him falling asleep. The patient was brought to the emergency room for further evaluation.       PAST MEDICAL HISTORY:  Past Medical History:   Diagnosis Date    Anxiety     Asthma     Atherosclerotic heart disease of native coronary artery without angina pectoris     COVID-19 01/16/2025    Dementia (HCC)     Depression     Disruptive mood dysregulation disorder     Gastroesophageal reflux disease without esophagitis     Hypokalemia     Localized edema     Pneumonia     PTSD (post-traumatic stress disorder)      PAST SURGICAL HISTORY:  No past surgical history on file.  FAMILY HISTORY:  No family history on file.  SOCIAL HISTORY:   Social History     Tobacco Use    Smoking status: Never    Smokeless tobacco: Never   Substance Use Topics    Alcohol use: Not on file      ALLERGIES:   No Known Allergies   HOME MEDICATIONS:  Prior to Admission medications    Medication Sig Start Date End Date Taking? Authorizing Provider   aspirin 81 MG 
   Lymphocytes % 24.8 12.0 - 49.0 %    Monocytes % 10.9 5.0 - 13.0 %    Eosinophils % 11.5 (H) 0.0 - 7.0 %    Basophils % 0.6 0.0 - 1.0 %    Immature Granulocytes % 0.2 0.0 - 0.5 %    Neutrophils Absolute 2.66 1.80 - 8.00 K/UL    Lymphocytes Absolute 1.27 0.80 - 3.50 K/UL    Monocytes Absolute 0.56 0.00 - 1.00 K/UL    Eosinophils Absolute 0.59 (H) 0.00 - 0.40 K/UL    Basophils Absolute 0.03 0.00 - 0.10 K/UL    Immature Granulocytes Absolute 0.01 0.00 - 0.04 K/UL    Differential Type AUTOMATED     Magnesium    Collection Time: 06/19/25  6:00 AM   Result Value Ref Range    Magnesium 1.9 1.6 - 2.4 mg/dL   Lactic Acid    Collection Time: 06/19/25  6:01 AM   Result Value Ref Range    Lactic Acid, Plasma 0.7 0.4 - 2.0 MMOL/L   Vascular duplex carotid bilateral    Collection Time: 06/19/25 10:27 AM   Result Value Ref Range    Right subclavian prox .5 cm/s    Right subclavian prox EDV 0.0 cm/s    Right cca dist PSV 48.2 cm/s    Right CCA dist EDV 0.0 cm/s    Right CCA prox PSV 71.1 cm/s    Right CCA prox EDV 0.0 cm/s    Right ICA dist PSV 42.7 cm/s    Right ICA dist EDV 11.3 cm/s    Right ICA mid PSV 51.1 cm/s    Right ICA mid EDV 11.9 cm/s    Right ICA prox PSV 16.5 cm/s    Right ICA prox EDV 6.0 cm/s    Right ECA PSV 60.3 cm/s    Right ECA EDV 0.00 cm/s    Right vertebral PSV 51.2 cm/s    Right vertebral EDV 11.60 cm/s    Right ICA/CCA PSV 1.1 no units    Left subclavian prox PSV 90.2 cm/s    Left subclavian prox EDV 15.8 cm/s    Left CCA dist PSV 41.3 cm/s    Left CCA dist EDV 9.4 cm/s    Left CCA prox PSV 80.2 cm/s    Left CCA prox EDV 0.0 cm/s    Left ICA dist PSV 47.6 cm/s    Left ICA dist EDV 13.6 cm/s    Left ICA mid PSV 52.0 cm/s    Left ICA mid EDV 12.7 cm/s    Left ICA prox PSV 38.0 cm/s    Left ICA prox EDV 11.0 cm/s    Left ECA PSV 50.4 cm/s    Left ECA EDV 0.00 cm/s    Left vertebral PSV 43.3 cm/s    Left vertebral EDV 12.70 cm/s    Left ICA/CCA PSV 1.30 no units    Body Surface Area 2.4 m2

## 2025-06-19 NOTE — WOUND CARE
Wound care nurse consult for POA left hip wound.    78 y/o male admitted for syncope and collapse.  Past Medical History:   Diagnosis Date    Anxiety     Asthma     Atherosclerotic heart disease of native coronary artery without angina pectoris     COVID-19 01/16/2025    Dementia (HCC)     Depression     Disruptive mood dysregulation disorder     Gastroesophageal reflux disease without esophagitis     Hypokalemia     Localized edema     Pneumonia     PTSD (post-traumatic stress disorder)      No past surgical history on file.    Patients wife at bedside main historian. She states that the left hip wound is from where patient always \"sits on it at home\". Opening DTPI      WOUND POA CONDITIONS    Wound 12/12/24 Buttocks Posterior MASD with denuded skin (Active)   Number of days: 189       Wound 06/18/25 Hip Left (Active)   Wound Image   06/19/25 1053   Wound Etiology Deep tissue/Injury 06/19/25 1527   Dressing Status New dressing applied 06/19/25 1527   Wound Cleansed Wound cleanser 06/19/25 1527   Dressing/Treatment Honey gel/honey paste;Dry dressing;Foam 06/19/25 1527   Dressing Change Due 06/20/25 06/19/25 1527   Wound Length (cm) 3.5 cm 06/19/25 1527   Wound Width (cm) 3.5 cm 06/19/25 1527   Wound Surface Area (cm^2) 12.25 cm^2 06/19/25 1527   Wound Assessment Purple/maroon 06/19/25 1527   Drainage Amount Small (< 25%) 06/19/25 1527   Drainage Description Serosanguinous 06/19/25 1527   Odor None 06/19/25 1527   [place]-wound Assessment Blanchable erythema;Denuded;Fragile 06/19/25 1053   Number of days: 1       Wound 02/17/25 Sacrum Right (Active)   Number of days: 122       Recommend and done:    Left posterior hip pressure injury: daily, cleanse with wound cleanser spray and 4x4, apply a small amount of Therahoney gel to wound, cover with a dry folded 4x4 and secure with a 6x6 foam dressing.    COMFORT MONTANEZ RN, CWON

## 2025-06-19 NOTE — PROGRESS NOTES
OCCUPATIONAL THERAPY EVALUATION/DISCHARGE  Patient: Josue Cruz (79 y.o. male)  Date: 6/19/2025  Primary Diagnosis: Syncope and collapse [R55]  Syncope, unspecified syncope type [R55]  Closed displaced fracture of shaft of right clavicle with routine healing, subsequent encounter [S46.021D]  Dementia, unspecified dementia severity, unspecified dementia type, unspecified whether behavioral, psychotic, or mood disturbance or anxiety ([facility]) [F03.90]         Precautions: Fall Risk                  ASSESSMENT :  Based on the objective data below, the patient is functioning close to his baseline for adls at this time.  He performed bed mobility with supervision and able to stand with CGA for incontinence care.  Medical record reviewed  (pt is unable to report his PLOF).  He has been living in LTC/memory care and per notes from  2/2025, has been able to feed himself, but is dependent for self care, X1 for functional mobility.   No follow up for OT services recommended,  as pt is likely functioning close to his baseline for adls and will benefit from returning to memory care/LTC.    Functional Outcome Measure:  The patient scored 20/100 on the Barthel Index outcome measure which is indicative of dependent adl performance.      Further skilled acute occupational therapy is not indicated at this time.     PLAN :  Recommend with staff: encourage upright and up to chair    Recommendation for discharge: (in order for the patient to meet his/her long term goals):   No skilled occupational therapy    Other factors to consider for discharge: poor safety awareness, impaired cognition, and high risk for falls    IF patient discharges home will need the following DME: likely none     SUBJECTIVE:   Patient  is generally confused and at times non sensical  OBJECTIVE DATA SUMMARY:     Past Medical History:   Diagnosis Date    Anxiety     Asthma     Atherosclerotic heart disease of native coronary artery without angina pectoris

## 2025-06-19 NOTE — PROCEDURES
PROCEDURE NOTE  Date: 6/19/2025   Name: Josue Cruz  YOB: 1945    Procedures        This Routine EEG was performed in real-time by an EEG technologist.  Electrodes placed according to the 10-20 International System.  Standard sensitivity 7uV/mm and high filter 70 Hz and low filter 1 Hz settings were set at initiation of the recording and adjustments, as appropriate, and noted on the recording.  Baseline electrode impedances were at or below 10k Ohms.  Per protocol, this recording data is uploaded/transferred from the EEG equipment to a central storage location in real time.  Duration of this EEG was 23:08 minutes.  Photic stimulation was performed, and Hyperventilation was not performed.  Digital analysis is recorded using a software tool that analyzes EEG data to identify seizures called Persyst.

## 2025-06-19 NOTE — PROGRESS NOTES
Hospitalist Progress Note    NAME:   Josue Cruz   : 1945   MRN: [id number]     Date/Time: 2025 3:35 PM  Patient PCP: No primary care provider on file.    Estimated discharge date: ? , SNF likely  Barriers: Pt/Ot eval, MRI brain, Neuro clearance, Cardio clearance, Echo      Assessment / Plan:    Multiple syncopal events, POA  ?Symptomatic Aortic stenosis, POA  ? Partial Complex Seizure POA- RRT called yesterday PM,s/p Valium x 1 with resolution of unresponsiveness  U/A - unremarkable   CT head - no acute intracranial abnormality  CTA chest abdomen pelvis - ascending aortic aneurysm not significantly changed with no evidence for aortic dissection, bibasilar atelectasis with small pleural effusions, borderline enlarged mediastinal lymph nodes  Cont  telemetry  EEG, IP Neurology consulted after episode of unresponsiveness with R sided twitching  PM during RRT , will get MRI Brain in the meantime  IP Cardiology consulted- following  TTE awaited  Carotid duplex pending report      Dementia, POA  Mood disorder, POA  Risk for hospital-associated delirium   Continue divalproex, olanzapine  Delirium precautions  PRN hydroxyzine for agitation   Check MRI brain  IP neurology consulted after ?seizure partial complex yesterday PM (RRT) s/p IV valium x 1 with resolution of alertness      Hypokalemia, POA- resolved  Lactic acidosis, POA  Lactic 2.20 > 0.97  Potassium 3.2   Replete potassium  Recheck lactic acid in AM      CAD with history of MI ()  Diastolic heart failure  History of DVT s/p IVC filter  Continue ASA     Right clavicle fracture, POA  Multiple right rib fractures, POA   CT chest abdomen pelvis - multiple subacute appearing right lateral rib fractures  CXR - acute right clavicle fracture, no evidence of acute cardiopulmonary process   Fall precautions     Diabetes mellitus type 2   No antihyperglycemic medications on file  Initiate AccuChecks, consider SSI if glucose is uncontrolled

## 2025-06-19 NOTE — PROCEDURES
[facility]              [address] [place] [address]                                   EEG      PATIENT NAME: JULIO TRACEY                 : 1945  MED REC NO: [id number]                       ROOM: 3213  ACCOUNT NO: [id number]                       ADMIT DATE: 2025  PROVIDER: Mirza Brambila MD    DATE OF SERVICE:  2025    CLINICAL INDICATION:  The patient is a 79-year-old male with a history of sudden altered mental status, loss of consciousness, possible convulsive syncope for possible acute altered mental status.    EEG CLASSIFICATION:  Dysrhythmia grade 1, generalized.    DESCRIPTION OF THE RECORD:  This is a 16-channel EEG recording on the patient to evaluate for possible acute altered mental status.  The patient had a mild increases in generalized 2-6 hertz slow wave activity seen throughout the recording.  The patient did not have any clear areas of focal slowing nor did he have any spike or spike-and-wave discharges or electrographic spells of any type seen.  The patient did enter frequent states of sleep with K complexes and sleep spindles seen in the central head regions.  Hyperventilation was not performed.  Photic stimulation produced a very mild driving response in the posterior head regions.    INTERPRETATION:  This is a mildly abnormal electroencephalogram on the patient showing some mild generalized slowing most consistent with diffuse encephalopathy of toxic, metabolic, or degenerative type.  No clear focal process or epileptiform activity seen.  Clinical correlation recommended.        MIRZA BRAMBILA MD      TAS/AQS  D:  2025 13:10:43  T:  2025 13:25:32  JOB #:  [id number]    CC:   Mirza Brambila MD

## 2025-06-19 NOTE — PROCEDURES
PROCEDURE NOTE  Date: 6/18/2025   Name: Josue Cruz  YOB: 1945    Procedures        Test Date: June 18, 2025    History: Patient with sudden collapse, for rapid EEG to rule out seizures, without convulsive syncope, rule out epilepsy.    Medications: See chart    Patient consent: Correct patient identified    Description of procedure: This EEG was obtained using a 10 lead, 8 channel system positioned circumferentially without any parasagittal coverage (rapid EEG). Computer selected EEG is reviewed as well as background features and all clinically significant events. Clarity algorithm with NTAP was utilized and implemented to provide analysis of underlying activity and seizure detection used to facilitate reading.    Description of recording: This is a rapid EEG on patient to evaluate for possible acute altered mental status and seizures, understood began on June 18, 2025 at approximately 2:40 PM and then June 18, 2025 at approximately 4:27 PM for total time of study 1 hour 47 minutes.  There was considerable movement muscle and electrode artifact seen in the recording, and there did appear to be a mild increase in some generalized slow-wave activity, but there were no epileptiform discharges, focal slowing, or electrographic seizures noted throughout the recording.    Impression: Somewhat technically poor rapid EEG due to muscle and electrode artifact, but it did appear to show some mild generalized slowing, but no clear areas of focal slowing no clear spike or spike and wave discharges no electrographic spells of any type seen.  Clinical correlation recommended and correlation with a standard 16 channel EEG recording may be of further diagnostic benefit.    Comment: A normal EEG does not rule out the diagnosis of a seizure disorder; clinical  correlation is advised.  If there is still persistent suspicion for continued seizure-like  activity, would advise obtaining an electroencephalogram with the

## 2025-06-19 NOTE — PLAN OF CARE
Problem: Physical Therapy - Adult  Goal: By Discharge: Performs mobility at highest level of function for planned discharge setting.  See evaluation for individualized goals.  Description: FUNCTIONAL STATUS PRIOR TO ADMISSION: Patient unable to provide history. Per chart review patient has assistance from LTC staff for all ADL and transfers. Reports he mostly uses w/c that he propels at facility.     HOME SUPPORT PRIOR TO ADMISSION: Patient had assistance from LTC staff. Has local family members.     Physical Therapy Goals  Initiated 6/19/2025  1.  Patient will move from supine to sit and sit to supine, scoot up and down, and roll side to side in bed with modified independence within 7 day(s).    2.  Patient will perform sit to stand with supervision/set-up within 7 day(s).  3.  Patient will transfer from bed to chair and chair to bed with contact guard assist using the least restrictive device within 7 day(s).    Outcome: Progressing   PHYSICAL THERAPY EVALUATION    Patient: Josue Cruz (79 y.o. male)  Date: 6/19/2025  Primary Diagnosis: Syncope and collapse [R55]  Syncope, unspecified syncope type [R55]  Closed displaced fracture of shaft of right clavicle with routine healing, subsequent encounter [S42.021D]  Dementia, unspecified dementia severity, unspecified dementia type, unspecified whether behavioral, psychotic, or mood disturbance or anxiety ([facility]) [F03.90]       Precautions: Restrictions/Precautions  Restrictions/Precautions: Fall Risk          ASSESSMENT :   DEFICITS/IMPAIRMENTS:   The patient is limited by decreased functional mobility, independence in ADLs, high-level IADLs, strength, safety awareness, cognition, attention/concentration, balance     Based on the impairments listed above patient presents below but likely close to baseline functional mobility. He was able to tolerate standing, remained standing for prolonged period with support of RW to allow for cleaning. Patient with excessive BM

## 2025-06-20 ENCOUNTER — APPOINTMENT (OUTPATIENT)
Facility: HOSPITAL | Age: 80
DRG: 312 | End: 2025-06-20
Payer: OTHER GOVERNMENT

## 2025-06-20 LAB
ANION GAP SERPL CALC-SCNC: 4 MMOL/L (ref 2–12)
BASOPHILS # BLD: 0.05 K/UL (ref 0–0.1)
BASOPHILS NFR BLD: 0.9 % (ref 0–1)
BUN SERPL-MCNC: 17 MG/DL (ref 6–20)
BUN/CREAT SERPL: 30 (ref 12–20)
CALCIUM SERPL-MCNC: 8.8 MG/DL (ref 8.5–10.1)
CHLORIDE SERPL-SCNC: 105 MMOL/L (ref 97–108)
CO2 SERPL-SCNC: 30 MMOL/L (ref 21–32)
CREAT SERPL-MCNC: 0.56 MG/DL (ref 0.7–1.3)
DIFFERENTIAL METHOD BLD: ABNORMAL
EOSINOPHIL # BLD: 0.56 K/UL (ref 0–0.4)
EOSINOPHIL NFR BLD: 9.7 % (ref 0–7)
ERYTHROCYTE [DISTWIDTH] IN BLOOD BY AUTOMATED COUNT: 15.4 % (ref 11.5–14.5)
GLUCOSE BLD STRIP.AUTO-MCNC: 104 MG/DL (ref 65–117)
GLUCOSE BLD STRIP.AUTO-MCNC: 115 MG/DL (ref 65–117)
GLUCOSE BLD STRIP.AUTO-MCNC: 93 MG/DL (ref 65–117)
GLUCOSE BLD STRIP.AUTO-MCNC: 94 MG/DL (ref 65–117)
GLUCOSE SERPL-MCNC: 73 MG/DL (ref 65–100)
HCT VFR BLD AUTO: 33.2 % (ref 36.6–50.3)
HGB BLD-MCNC: 9.6 G/DL (ref 12.1–17)
IMM GRANULOCYTES # BLD AUTO: 0.02 K/UL (ref 0–0.04)
IMM GRANULOCYTES NFR BLD AUTO: 0.3 % (ref 0–0.5)
LYMPHOCYTES # BLD: 1.32 K/UL (ref 0.8–3.5)
LYMPHOCYTES NFR BLD: 22.8 % (ref 12–49)
MAGNESIUM SERPL-MCNC: 2 MG/DL (ref 1.6–2.4)
MCH RBC QN AUTO: 28.2 PG (ref 26–34)
MCHC RBC AUTO-ENTMCNC: 28.9 G/DL (ref 30–36.5)
MCV RBC AUTO: 97.4 FL (ref 80–99)
MONOCYTES # BLD: 0.54 K/UL (ref 0–1)
MONOCYTES NFR BLD: 9.3 % (ref 5–13)
NEUTS SEG # BLD: 3.31 K/UL (ref 1.8–8)
NEUTS SEG NFR BLD: 57 % (ref 32–75)
NRBC # BLD: 0 K/UL (ref 0–0.01)
NRBC BLD-RTO: 0 PER 100 WBC
PLATELET # BLD AUTO: 267 K/UL (ref 150–400)
PMV BLD AUTO: 10.7 FL (ref 8.9–12.9)
POTASSIUM SERPL-SCNC: 3.5 MMOL/L (ref 3.5–5.1)
RBC # BLD AUTO: 3.41 M/UL (ref 4.1–5.7)
RBC MORPH BLD: ABNORMAL
RBC MORPH BLD: ABNORMAL
SERVICE CMNT-IMP: NORMAL
SODIUM SERPL-SCNC: 139 MMOL/L (ref 136–145)
WBC # BLD AUTO: 5.8 K/UL (ref 4.1–11.1)

## 2025-06-20 PROCEDURE — 70551 MRI BRAIN STEM W/O DYE: CPT

## 2025-06-20 PROCEDURE — 83735 ASSAY OF MAGNESIUM: CPT

## 2025-06-20 PROCEDURE — 80048 BASIC METABOLIC PNL TOTAL CA: CPT

## 2025-06-20 PROCEDURE — 1100000003 HC PRIVATE W/ TELEMETRY

## 2025-06-20 PROCEDURE — 2500000003 HC RX 250 WO HCPCS

## 2025-06-20 PROCEDURE — 82962 GLUCOSE BLOOD TEST: CPT

## 2025-06-20 PROCEDURE — 85025 COMPLETE CBC W/AUTO DIFF WBC: CPT

## 2025-06-20 PROCEDURE — 6370000000 HC RX 637 (ALT 250 FOR IP): Performed by: INTERNAL MEDICINE

## 2025-06-20 PROCEDURE — 6370000000 HC RX 637 (ALT 250 FOR IP)

## 2025-06-20 PROCEDURE — 6360000002 HC RX W HCPCS

## 2025-06-20 PROCEDURE — 6360000002 HC RX W HCPCS: Performed by: INTERNAL MEDICINE

## 2025-06-20 PROCEDURE — 36415 COLL VENOUS BLD VENIPUNCTURE: CPT

## 2025-06-20 PROCEDURE — 99233 SBSQ HOSP IP/OBS HIGH 50: CPT

## 2025-06-20 RX ORDER — TRAZODONE HYDROCHLORIDE 50 MG/1
50 TABLET ORAL NIGHTLY
Status: DISCONTINUED | OUTPATIENT
Start: 2025-06-20 | End: 2025-06-23 | Stop reason: HOSPADM

## 2025-06-20 RX ADMIN — SODIUM CHLORIDE, PRESERVATIVE FREE 10 ML: 5 INJECTION INTRAVENOUS at 09:19

## 2025-06-20 RX ADMIN — HYDROXYZINE HYDROCHLORIDE 25 MG: 50 INJECTION, SOLUTION INTRAMUSCULAR at 06:48

## 2025-06-20 RX ADMIN — Medication 1 TABLET: at 09:20

## 2025-06-20 RX ADMIN — Medication 1 TABLET: at 09:19

## 2025-06-20 RX ADMIN — ENOXAPARIN SODIUM 30 MG: 100 INJECTION SUBCUTANEOUS at 09:20

## 2025-06-20 RX ADMIN — CALCIUM CARBONATE (ANTACID) CHEW TAB 500 MG 500 MG: 500 CHEW TAB at 20:05

## 2025-06-20 RX ADMIN — OLANZAPINE 2.5 MG: 5 TABLET, FILM COATED ORAL at 09:20

## 2025-06-20 RX ADMIN — ENOXAPARIN SODIUM 30 MG: 100 INJECTION SUBCUTANEOUS at 20:05

## 2025-06-20 RX ADMIN — MIDODRINE HYDROCHLORIDE 10 MG: 5 TABLET ORAL at 12:18

## 2025-06-20 RX ADMIN — Medication 1 TABLET: at 20:05

## 2025-06-20 RX ADMIN — DIVALPROEX SODIUM 125 MG: 125 CAPSULE, COATED PELLETS ORAL at 09:19

## 2025-06-20 RX ADMIN — DIAZEPAM 5 MG: 5 INJECTION, SOLUTION INTRAMUSCULAR; INTRAVENOUS at 20:49

## 2025-06-20 RX ADMIN — ACETAMINOPHEN 650 MG: 325 TABLET ORAL at 20:05

## 2025-06-20 RX ADMIN — DIVALPROEX SODIUM 125 MG: 125 CAPSULE, COATED PELLETS ORAL at 20:05

## 2025-06-20 RX ADMIN — OXYCODONE HYDROCHLORIDE AND ACETAMINOPHEN 500 MG: 500 TABLET ORAL at 09:20

## 2025-06-20 RX ADMIN — OLANZAPINE 2.5 MG: 5 TABLET, FILM COATED ORAL at 20:05

## 2025-06-20 RX ADMIN — ACETAMINOPHEN 650 MG: 325 TABLET ORAL at 03:52

## 2025-06-20 RX ADMIN — MIDODRINE HYDROCHLORIDE 10 MG: 5 TABLET ORAL at 09:19

## 2025-06-20 RX ADMIN — GUAIFENESIN 600 MG: 600 TABLET, EXTENDED RELEASE ORAL at 20:05

## 2025-06-20 RX ADMIN — ASPIRIN 81 MG: 81 TABLET, CHEWABLE ORAL at 09:19

## 2025-06-20 RX ADMIN — CALCIUM CARBONATE (ANTACID) CHEW TAB 500 MG 500 MG: 500 CHEW TAB at 09:20

## 2025-06-20 RX ADMIN — ZINC SULFATE 220 MG (50 MG) CAPSULE 50 MG: CAPSULE at 09:20

## 2025-06-20 RX ADMIN — PANTOPRAZOLE SODIUM 40 MG: 40 TABLET, DELAYED RELEASE ORAL at 05:06

## 2025-06-20 RX ADMIN — MIDODRINE HYDROCHLORIDE 10 MG: 5 TABLET ORAL at 17:23

## 2025-06-20 RX ADMIN — TRAZODONE HYDROCHLORIDE 50 MG: 50 TABLET ORAL at 20:05

## 2025-06-20 RX ADMIN — GUAIFENESIN 600 MG: 600 TABLET, EXTENDED RELEASE ORAL at 03:52

## 2025-06-20 RX ADMIN — Medication 1 BOTTLE: at 09:33

## 2025-06-20 RX ADMIN — SODIUM CHLORIDE, PRESERVATIVE FREE 10 ML: 5 INJECTION INTRAVENOUS at 20:42

## 2025-06-20 ASSESSMENT — PAIN SCALES - PAIN ASSESSMENT IN ADVANCED DEMENTIA (PAINAD)
BODYLANGUAGE: TENSE, DISTRESSED PACING, FIDGETING
TOTALSCORE: 5
TOTALSCORE: 3
FACIALEXPRESSION: SMILING OR INEXPRESSIVE
BREATHING: NORMAL
NEGVOCALIZATION: OCCASIONAL MOAN/GROAN, LOW SPEECH, NEGATIVE/DISAPPROVING QUALITY
CONSOLABILITY: DISTRACTED OR REASSURED BY VOICE/TOUCH
NEGVOCALIZATION: OCCASIONAL MOAN/GROAN, LOW SPEECH, NEGATIVE/DISAPPROVING QUALITY
FACIALEXPRESSION: SMILING OR INEXPRESSIVE
CONSOLABILITY: DISTRACTED OR REASSURED BY VOICE/TOUCH
TOTALSCORE: 0
CONSOLABILITY: NO NEED TO CONSOLE
BODYLANGUAGE: RELAXED
FACIALEXPRESSION: FACIAL GRIMACING
BODYLANGUAGE: TENSE, DISTRESSED PACING, FIDGETING

## 2025-06-20 ASSESSMENT — PAIN SCALES - GENERAL
PAINLEVEL_OUTOF10: 0

## 2025-06-20 NOTE — PLAN OF CARE
Problem: Discharge Planning  Goal: Discharge to home or other facility with appropriate resources  Outcome: Progressing     Problem: Pain  Goal: Verbalizes/displays adequate comfort level or baseline comfort level  Outcome: Progressing     Problem: Skin/Tissue Integrity  Goal: Skin integrity remains intact  Description: 1.  Monitor for areas of redness and/or skin breakdown  2.  Assess vascular access sites hourly  3.  Every 4-6 hours minimum:  Change oxygen saturation probe site  4.  Every 4-6 hours:  If on nasal continuous positive airway pressure, respiratory therapy assess nares and determine need for appliance change or resting period  Outcome: Progressing  Flowsheets (Taken 6/19/2025 0830 by Susan Lebron LPN)  Skin Integrity Remains Intact:   Monitor for areas of redness and/or skin breakdown   Positioning devices   Pressure redistribution bed/mattress (bed type)     Problem: Safety - Adult  Goal: Free from fall injury  Outcome: Progressing     Problem: ABCDS Injury Assessment  Goal: Absence of physical injury  Outcome: Progressing     Problem: Physical Therapy - Adult  Goal: By Discharge: Performs mobility at highest level of function for planned discharge setting.  See evaluation for individualized goals.  Description: FUNCTIONAL STATUS PRIOR TO ADMISSION: Patient unable to provide history. Per chart review patient has assistance from LTC staff for all ADL and transfers. Reports he mostly uses w/c that he propels at facility.     HOME SUPPORT PRIOR TO ADMISSION: Patient had assistance from LTC staff. Has local family members.     Physical Therapy Goals  Initiated 6/19/2025  1.  Patient will move from supine to sit and sit to supine, scoot up and down, and roll side to side in bed with modified independence within 7 day(s).    2.  Patient will perform sit to stand with supervision/set-up within 7 day(s).  3.  Patient will transfer from bed to chair and chair to bed with contact guard assist using the

## 2025-06-20 NOTE — PROGRESS NOTES
End of Shift Note    Bedside shift change report given to CARMITA Martin (oncoming nurse) by Ruth Garcia LPN (offgoing nurse).  Report included the following information SBAR and MAR    Shift worked:  [id number]     Shift summary and any significant changes:     Wound care completed. Patient jumping out of bed but redirectable. Patient had increased expiratory wheezing, RT called to take a look at him. X3 bowel movement. Meds given per MAR. Plan of care ongoing.      Concerns for physician to address:  None     Zone phone for oncoming shift:   [phone]       Ruth Garcia LPN

## 2025-06-20 NOTE — PROGRESS NOTES
End of Shift Note    Bedside shift change report given to AYAH Miranda (oncoming nurse) by Veronica Huerta RN (offgoing nurse).  Report included the following information SBAR, Kardex, MAR, Recent Results, Cardiac Rhythm sinus rhythm, and Quality Measures    Shift worked:  [id number]     Shift summary and any significant changes:     Patient awake all night.Restless at times,redirectable, impulsive,hyperverbal, still on room air ,complaint of head ache,tylenol 1 x given,not in distress.Plan of care ongoing.     Concerns for physician to address:       Zone phone for oncoming shift:          Activity:  Level of Assistance: Maximum assist, patient does 25-49%  Number times ambulated in hallways past shift: 0  Number of times OOB to chair past shift: 0    Cardiac:   Cardiac Monitoring: Yes      Cardiac Rhythm: Sinus rhythm    Access:  Current line(s): PIV     Genitourinary:   Urinary Status: Voiding, External catheter    Respiratory:   O2 Device: None (Room air)  Chronic home O2 use?: NO  Incentive spirometer at bedside: NO    GI:  Last BM (including prior to admit): 06/19/25  Current diet:  ADULT DIET; Regular; 3 carb choices (45 gm/meal); Low Fat/Low Chol/High Fiber/2 gm Na  Passing flatus: YES    Pain Management:   Patient states pain is manageable on current regimen: YES    Skin:  Erick Scale Score: 17  Interventions: Wound Offloading (Prevention Methods): Turning, Repositioning    Patient Safety:  Fall Risk: Nursing Judgement-Fall Risk High(Add Comments): Yes  Fall Risk Interventions  Nursing Judgement-Fall Risk High(Add Comments): Yes  Toilet Every 2 Hours-In Advance of Need: No (Comment)  Hourly Visual Checks: Awake, In bed  Fall Visual Posted: Armband  Room Door Open: Yes  Alarm On: Bed  Patient Moved Closer to Nursing Station: No    Active Consults:   IP CONSULT TO HOSPITALIST  IP CONSULT TO CARDIOLOGY  IP CONSULT TO NEUROLOGY    Length of Stay:  Expected LOS: 4  Actual LOS: 2    Veronica Huerta

## 2025-06-20 NOTE — PROGRESS NOTES
HOSPITAL NEUROLOGY NOTE     Chief Complaint   Patient presents with    Loss of Consciousness     Pt BIBLADY from [facility] with reports of 6+ syncopy episodes this morning. Pt had syncopal episode for EMS and sternal rub woke him up. VSS en route. Pt endorses dizziness and a \"crushing headache\", chest pain, and shortness of breath. Pt states \"I brought the chest pain and shortness of breath home from [place] with me\".           HPI  Josue Cruz is a 79 y.o. male  with a history of anxiety, asthma, dementia, depression, GERD, PTSD, coronary artery disease, aortic stenosis, iron deficiency, who presented to the ED on 6/18/2025 with a chief complaint of multiple syncopal episodes that started in the morning.  Patient currently resides at [facility].  It was reported by patient's daughter there were about 6 episodes of syncopal events ranging between 30 seconds to 30 minutes each.  He did return to his baseline between each event.  Patient did have similar episodes last month and family thought this could be him falling asleep.  He has been experiencing intermittent headaches radiating to his forehead and base of the skull.  He denied any dizziness, visual disturbance, numbness, or tingling.  He denied any chest pain, dyspnea, palpitations, nausea, or vomiting.  He does take aspirin every day.  Patient was taken to the ED for evaluation.  In the ED, patient was found to have blood pressure in the 90s/50s.  He was admitted for further evaluation and workup.  Neurology was consulted and patient was seen by Dr. Berman.    Head CT without contrast from 6/18/2025 showed no acute intracranial abnormality.  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    CTA head and neck from 6/19/2025 showed no large vessel occlusion or hemodynamically significant carotid stenosis.  Cerebral atrophy and sequela of chronic microvascular angiopathy.    Carotid ultrasound from 6/19/2025 showed mild (<50%) stenosis in

## 2025-06-20 NOTE — PROGRESS NOTES
Hospitalist Progress Note    NAME:   Josue Cruz   : 1945   MRN: [id number]     Date/Time: 2025 9:06 AM  Patient PCP: No primary care provider on file.    Estimated discharge date: ? , [facility]  Barriers: MRI brain, Neuro clearance, Cardio clearance, Echo      Assessment / Plan:    Multiple syncopal events, POA  ?Symptomatic Moderate Aortic stenosis, POA  ? Partial Complex Seizure POA- RRT called  ,s/p Valium x 1 with resolution of unresponsiveness  U/A - unremarkable   CT head - no acute intracranial abnormality  CTA chest abdomen pelvis - ascending aortic aneurysm not significantly changed with no evidence for aortic dissection, bibasilar atelectasis with small pleural effusions, borderline enlarged mediastinal lymph nodes  Cont  telemetry  EEG ()= No clear focal process or epileptiform activity seen ,  IP Neurology consulted - following after episode of unresponsiveness with R sided twitching  PM during RRT ,  MRI Brain - pending  IP Cardiology consulted- following, TTE awaited  Carotid duplex neg study- only mild diseases noted  PT/OT eval noted- recommends back to [facility]/[place]     Dementia, POA  Mood disorder, POA  Risk for hospital-associated delirium   Continue divalproex, olanzapine  Delirium precautions  PRN hydroxyzine for agitation   Check MRI brain  IP neurology consulted after ?seizure partial complex  s/p IV valium x 1 with resolution of alertness      Hypokalemia, POA- resolved  Lactic acidosis, POA  Lactic 2.20 > 0.97  Potassium 3.2   Replete potassium as needed       CAD with history of MI ()  Diastolic heart failure  History of DVT s/p IVC filter  Continue ASA     Right clavicle fracture, POA  Multiple right rib fractures, POA   CT chest abdomen pelvis - multiple subacute appearing right lateral rib fractures  CXR - acute right clavicle fracture, no evidence of acute cardiopulmonary process   Fall precautions  PT.OT eval noted-- back to [facility]

## 2025-06-21 ENCOUNTER — APPOINTMENT (OUTPATIENT)
Facility: HOSPITAL | Age: 80
DRG: 312 | End: 2025-06-21
Payer: OTHER GOVERNMENT

## 2025-06-21 LAB
ANION GAP SERPL CALC-SCNC: 3 MMOL/L (ref 2–12)
BASOPHILS # BLD: 0.02 K/UL (ref 0–0.1)
BASOPHILS NFR BLD: 0.3 % (ref 0–1)
BUN SERPL-MCNC: 13 MG/DL (ref 6–20)
BUN/CREAT SERPL: 22 (ref 12–20)
CALCIUM SERPL-MCNC: 8.4 MG/DL (ref 8.5–10.1)
CHLORIDE SERPL-SCNC: 106 MMOL/L (ref 97–108)
CO2 SERPL-SCNC: 31 MMOL/L (ref 21–32)
CREAT SERPL-MCNC: 0.58 MG/DL (ref 0.7–1.3)
DIFFERENTIAL METHOD BLD: ABNORMAL
EOSINOPHIL # BLD: 0.53 K/UL (ref 0–0.4)
EOSINOPHIL NFR BLD: 6.9 % (ref 0–7)
ERYTHROCYTE [DISTWIDTH] IN BLOOD BY AUTOMATED COUNT: 14.9 % (ref 11.5–14.5)
GLUCOSE BLD STRIP.AUTO-MCNC: 123 MG/DL (ref 65–117)
GLUCOSE BLD STRIP.AUTO-MCNC: 124 MG/DL (ref 65–117)
GLUCOSE BLD STRIP.AUTO-MCNC: 98 MG/DL (ref 65–117)
GLUCOSE BLD STRIP.AUTO-MCNC: 99 MG/DL (ref 65–117)
GLUCOSE SERPL-MCNC: 82 MG/DL (ref 65–100)
HCT VFR BLD AUTO: 32.3 % (ref 36.6–50.3)
HGB BLD-MCNC: 9.7 G/DL (ref 12.1–17)
IMM GRANULOCYTES # BLD AUTO: 0.03 K/UL (ref 0–0.04)
IMM GRANULOCYTES NFR BLD AUTO: 0.4 % (ref 0–0.5)
LYMPHOCYTES # BLD: 0.89 K/UL (ref 0.8–3.5)
LYMPHOCYTES NFR BLD: 11.6 % (ref 12–49)
MCH RBC QN AUTO: 29 PG (ref 26–34)
MCHC RBC AUTO-ENTMCNC: 30 G/DL (ref 30–36.5)
MCV RBC AUTO: 96.4 FL (ref 80–99)
MONOCYTES # BLD: 0.84 K/UL (ref 0–1)
MONOCYTES NFR BLD: 10.9 % (ref 5–13)
NEUTS SEG # BLD: 5.37 K/UL (ref 1.8–8)
NEUTS SEG NFR BLD: 69.9 % (ref 32–75)
NRBC # BLD: 0 K/UL (ref 0–0.01)
NRBC BLD-RTO: 0 PER 100 WBC
PLATELET # BLD AUTO: 256 K/UL (ref 150–400)
PMV BLD AUTO: 10.5 FL (ref 8.9–12.9)
POTASSIUM SERPL-SCNC: 3.6 MMOL/L (ref 3.5–5.1)
RBC # BLD AUTO: 3.35 M/UL (ref 4.1–5.7)
SERVICE CMNT-IMP: ABNORMAL
SERVICE CMNT-IMP: ABNORMAL
SERVICE CMNT-IMP: NORMAL
SERVICE CMNT-IMP: NORMAL
SODIUM SERPL-SCNC: 140 MMOL/L (ref 136–145)
WBC # BLD AUTO: 7.7 K/UL (ref 4.1–11.1)

## 2025-06-21 PROCEDURE — 80048 BASIC METABOLIC PNL TOTAL CA: CPT

## 2025-06-21 PROCEDURE — 36415 COLL VENOUS BLD VENIPUNCTURE: CPT

## 2025-06-21 PROCEDURE — 93321 DOPPLER ECHO F-UP/LMTD STD: CPT

## 2025-06-21 PROCEDURE — 2500000003 HC RX 250 WO HCPCS

## 2025-06-21 PROCEDURE — 6370000000 HC RX 637 (ALT 250 FOR IP)

## 2025-06-21 PROCEDURE — 6370000000 HC RX 637 (ALT 250 FOR IP): Performed by: INTERNAL MEDICINE

## 2025-06-21 PROCEDURE — 1100000003 HC PRIVATE W/ TELEMETRY

## 2025-06-21 PROCEDURE — 6360000002 HC RX W HCPCS

## 2025-06-21 PROCEDURE — 6360000002 HC RX W HCPCS: Performed by: INTERNAL MEDICINE

## 2025-06-21 PROCEDURE — 82962 GLUCOSE BLOOD TEST: CPT

## 2025-06-21 PROCEDURE — 6360000004 HC RX CONTRAST MEDICATION

## 2025-06-21 PROCEDURE — 85025 COMPLETE CBC W/AUTO DIFF WBC: CPT

## 2025-06-21 RX ORDER — HALOPERIDOL 5 MG/ML
1 INJECTION INTRAMUSCULAR EVERY 6 HOURS PRN
Status: DISCONTINUED | OUTPATIENT
Start: 2025-06-21 | End: 2025-06-21

## 2025-06-21 RX ORDER — MICONAZOLE NITRATE 2 G/100G
CREAM TOPICAL 2 TIMES DAILY
Status: DISCONTINUED | OUTPATIENT
Start: 2025-06-21 | End: 2025-06-23 | Stop reason: HOSPADM

## 2025-06-21 RX ORDER — HALOPERIDOL 5 MG/ML
1 INJECTION INTRAMUSCULAR ONCE AS NEEDED
Status: COMPLETED | OUTPATIENT
Start: 2025-06-21 | End: 2025-06-21

## 2025-06-21 RX ADMIN — ENOXAPARIN SODIUM 30 MG: 100 INJECTION SUBCUTANEOUS at 22:18

## 2025-06-21 RX ADMIN — MIDODRINE HYDROCHLORIDE 10 MG: 5 TABLET ORAL at 14:55

## 2025-06-21 RX ADMIN — TRAZODONE HYDROCHLORIDE 50 MG: 50 TABLET ORAL at 22:19

## 2025-06-21 RX ADMIN — ENOXAPARIN SODIUM 30 MG: 100 INJECTION SUBCUTANEOUS at 09:24

## 2025-06-21 RX ADMIN — OLANZAPINE 2.5 MG: 5 TABLET, FILM COATED ORAL at 09:24

## 2025-06-21 RX ADMIN — SODIUM CHLORIDE, PRESERVATIVE FREE 10 ML: 5 INJECTION INTRAVENOUS at 22:19

## 2025-06-21 RX ADMIN — MIDODRINE HYDROCHLORIDE 10 MG: 5 TABLET ORAL at 09:24

## 2025-06-21 RX ADMIN — SULFUR HEXAFLUORIDE 2 ML: KIT at 10:48

## 2025-06-21 RX ADMIN — ZINC SULFATE 220 MG (50 MG) CAPSULE 50 MG: CAPSULE at 09:24

## 2025-06-21 RX ADMIN — MIDODRINE HYDROCHLORIDE 10 MG: 5 TABLET ORAL at 16:45

## 2025-06-21 RX ADMIN — CALCIUM CARBONATE (ANTACID) CHEW TAB 500 MG 500 MG: 500 CHEW TAB at 22:18

## 2025-06-21 RX ADMIN — Medication 1 BOTTLE: at 09:25

## 2025-06-21 RX ADMIN — SODIUM CHLORIDE, PRESERVATIVE FREE 10 ML: 5 INJECTION INTRAVENOUS at 09:26

## 2025-06-21 RX ADMIN — DIVALPROEX SODIUM 125 MG: 125 CAPSULE, COATED PELLETS ORAL at 09:23

## 2025-06-21 RX ADMIN — OLANZAPINE 2.5 MG: 5 TABLET, FILM COATED ORAL at 22:18

## 2025-06-21 RX ADMIN — HYDROXYZINE HYDROCHLORIDE 25 MG: 50 INJECTION, SOLUTION INTRAMUSCULAR at 14:56

## 2025-06-21 RX ADMIN — ASPIRIN 81 MG: 81 TABLET, CHEWABLE ORAL at 09:24

## 2025-06-21 RX ADMIN — HALOPERIDOL LACTATE 1 MG: 5 INJECTION, SOLUTION INTRAMUSCULAR at 16:19

## 2025-06-21 RX ADMIN — CALCIUM CARBONATE (ANTACID) CHEW TAB 500 MG 500 MG: 500 CHEW TAB at 09:24

## 2025-06-21 RX ADMIN — Medication 1 TABLET: at 09:24

## 2025-06-21 RX ADMIN — Medication 1 TABLET: at 22:18

## 2025-06-21 RX ADMIN — PANTOPRAZOLE SODIUM 40 MG: 40 TABLET, DELAYED RELEASE ORAL at 06:35

## 2025-06-21 RX ADMIN — DIVALPROEX SODIUM 125 MG: 125 CAPSULE, COATED PELLETS ORAL at 22:18

## 2025-06-21 RX ADMIN — OXYCODONE HYDROCHLORIDE AND ACETAMINOPHEN 500 MG: 500 TABLET ORAL at 09:24

## 2025-06-21 ASSESSMENT — PAIN SCALES - PAIN ASSESSMENT IN ADVANCED DEMENTIA (PAINAD)
BREATHING: NORMAL
TOTALSCORE: 0
FACIALEXPRESSION: SMILING OR INEXPRESSIVE
BODYLANGUAGE: RELAXED
CONSOLABILITY: NO NEED TO CONSOLE

## 2025-06-21 ASSESSMENT — PAIN SCALES - GENERAL
PAINLEVEL_OUTOF10: 0
PAINLEVEL_OUTOF10: 0

## 2025-06-21 NOTE — PROGRESS NOTES
End of Shift Note    Bedside shift change report given to CARMITA Serrano (oncoming nurse) by Lois Mensah RN (offgoing nurse).  Report included the following information SBAR    Shift worked:  6571-7829     Shift summary and any significant changes:    Pt increasing agitation. Pt fall risk increased d/t multiple attempts to leave bed w/o using call light. Provider contacted. Haldol given per order.      Concerns for physician to address: Will continue to monitor Pt.      Zone phone for oncoming shift:   [phone]       Activity:  Level of Assistance: Maximum assist, patient does 25-49%  Number times ambulated in hallways past shift: 0  Number of times OOB to chair past shift: 0    Cardiac:   Cardiac Monitoring: No      Cardiac Rhythm: Sinus rhythm    Access:  Current line(s): PIV    Genitourinary:   Urinary Status: Voiding, Diaper, External catheter    Respiratory:   O2 Device: None (Room air)  Chronic home O2 use?: NO  Incentive spirometer at bedside: NO    GI:  Last BM (including prior to admit): 06/20/25  Current diet:  ADULT DIET; Regular; 3 carb choices (45 gm/meal); Low Fat/Low Chol/High Fiber/2 gm Na  Passing flatus: YES    Pain Management:   Patient states pain is manageable on current regimen: YES    Skin:  Erick Scale Score: 18  Interventions: Wound Offloading (Prevention Methods): Turning    Patient Safety:  Fall Risk: Nursing Judgement-Fall Risk High(Add Comments): Yes  Fall Risk Interventions  Nursing Judgement-Fall Risk High(Add Comments): Yes  Toilet Every 2 Hours-In Advance of Need: No (Comment)  Hourly Visual Checks: Awake, In bed  Fall Visual Posted: Armband  Room Door Open: Yes  Alarm On: Bed  Patient Moved Closer to Nursing Station: No    Active Consults:   IP CONSULT TO HOSPITALIST  IP CONSULT TO CARDIOLOGY  IP CONSULT TO NEUROLOGY    Length of Stay:  Expected LOS: 3  Actual LOS: 3    Lois Mensah RN

## 2025-06-21 NOTE — PROGRESS NOTES
End of Shift Note    Bedside shift change report given to CARMITA Abreu (oncoming nurse) by Veronica Huerta RN (offgoing nurse).  Report included the following information SBAR, Kardex, MAR, Recent Results, and Quality Measures    Shift worked:  1282-6165     Shift summary and any significant changes:    Patient was restless,does not follow commands, agitated during start of shift.With order for MRI of brain, verified to MD Lee if okay to administer the standing order diazepam 5 mg once.Patient asleep all night.Plan of care ongoing.   Concerns for physician to address:       Zone phone for oncoming shift:          Activity:  Level of Assistance: Maximum assist, patient does 25-49%  Number times ambulated in hallways past shift: 0  Number of times OOB to chair past shift: 0    Cardiac:   Cardiac Monitoring: No      Cardiac Rhythm: Sinus rhythm    Access:  Current line(s): PIV     Genitourinary:   Urinary Status: Voiding, Diaper, External catheter    Respiratory:   O2 Device: None (Room air)  Chronic home O2 use?: NO  Incentive spirometer at bedside: NO    GI:  Last BM (including prior to admit): 06/20/25  Current diet:  ADULT DIET; Regular; 3 carb choices (45 gm/meal); Low Fat/Low Chol/High Fiber/2 gm Na  Passing flatus: YES    Pain Management:   Patient states pain is manageable on current regimen: YES    Skin:  Erick Scale Score: 18  Interventions: Wound Offloading (Prevention Methods): Turning    Patient Safety:  Fall Risk: Nursing Judgement-Fall Risk High(Add Comments): Yes  Fall Risk Interventions  Nursing Judgement-Fall Risk High(Add Comments): Yes  Toilet Every 2 Hours-In Advance of Need: No (Comment)  Hourly Visual Checks: Awake, In bed  Fall Visual Posted: Armband  Room Door Open: Yes  Alarm On: Bed  Patient Moved Closer to Nursing Station: No    Active Consults:   IP CONSULT TO HOSPITALIST  IP CONSULT TO CARDIOLOGY  IP CONSULT TO NEUROLOGY    Length of Stay:  Expected LOS: 3  Actual LOS: 3    Veronica

## 2025-06-21 NOTE — PLAN OF CARE
Problem: Discharge Planning  Goal: Discharge to home or other facility with appropriate resources  Outcome: Progressing     Problem: Pain  Goal: Verbalizes/displays adequate comfort level or baseline comfort level  Outcome: Progressing     Problem: Skin/Tissue Integrity  Goal: Skin integrity remains intact  Description: 1.  Monitor for areas of redness and/or skin breakdown  2.  Assess vascular access sites hourly  3.  Every 4-6 hours minimum:  Change oxygen saturation probe site  4.  Every 4-6 hours:  If on nasal continuous positive airway pressure, respiratory therapy assess nares and determine need for appliance change or resting period  Outcome: Progressing     Problem: Safety - Adult  Goal: Free from fall injury  6/20/2025 2240 by Veronica Huerta RN  Outcome: Progressing  6/20/2025 1036 by Ruth Garcia LPN  Outcome: Progressing  6/20/2025 1029 by Ruth Garcia LPN  Outcome: Progressing     Problem: ABCDS Injury Assessment  Goal: Absence of physical injury  Outcome: Progressing

## 2025-06-21 NOTE — PLAN OF CARE
Problem: Discharge Planning  Goal: Discharge to home or other facility with appropriate resources  6/21/2025 0936 by Lois Alvarez RN  Outcome: Progressing  6/20/2025 2240 by Veronica Huerta RN  Outcome: Progressing     Problem: Pain  Goal: Verbalizes/displays adequate comfort level or baseline comfort level  6/21/2025 0936 by Lois Alvarez RN  Outcome: Progressing  Flowsheets (Taken 6/21/2025 0915)  Verbalizes/displays adequate comfort level or baseline comfort level: Encourage patient to monitor pain and request assistance  6/20/2025 2240 by Veronica Huerta RN  Outcome: Progressing     Problem: Skin/Tissue Integrity  Goal: Skin integrity remains intact  Description: 1.  Monitor for areas of redness and/or skin breakdown  2.  Assess vascular access sites hourly  3.  Every 4-6 hours minimum:  Change oxygen saturation probe site  4.  Every 4-6 hours:  If on nasal continuous positive airway pressure, respiratory therapy assess nares and determine need for appliance change or resting period  6/21/2025 0936 by Lois Alvarez RN  Outcome: Progressing  6/20/2025 2240 by Veronica Huerta RN  Outcome: Progressing     Problem: Safety - Adult  Goal: Free from fall injury  6/21/2025 0936 by Lois Alvarez RN  Outcome: Progressing  6/20/2025 2240 by Veronica Huerta RN  Outcome: Progressing     Problem: ABCDS Injury Assessment  Goal: Absence of physical injury  6/21/2025 0936 by Lois Alvarez RN  Outcome: Progressing  6/20/2025 2240 by Veronica Huerta RN  Outcome: Progressing

## 2025-06-21 NOTE — PROGRESS NOTES
Hospitalist Progress Note    NAME:   Josue Cruz   : 1945   MRN: [id number]     Date/Time: 2025 6:24 PM  Patient PCP: No primary care provider on file.    Estimated discharge date: ? , [facility]  Barriers: MRI brain, Neuro clearance, Cardio clearance, Echo      Assessment / Plan:    Multiple syncopal events, POA  ?Symptomatic Moderate Aortic stenosis, POA  ? Partial Complex Seizure POA- RRT called  ,s/p Valium x 1 with resolution of unresponsiveness  U/A - unremarkable   CT head - no acute intracranial abnormality  CTA chest abdomen pelvis - ascending aortic aneurysm not significantly changed with no evidence for aortic dissection, bibasilar atelectasis with small pleural effusions, borderline enlarged mediastinal lymph nodes  Cont  telemetry  EEG ()= No clear focal process or epileptiform activity seen ,  IP Neurology consulted - following after episode of unresponsiveness with R sided twitching  PM during RRT ,  MRI Brain - pending  IP Cardiology consulted- following, TTE awaited  Carotid duplex neg study- only mild diseases noted  PT/OT eval noted- recommends back to [facility]/[place]     Dementia, POA  Mood disorder, POA  Risk for hospital-associated delirium   Continue divalproex, olanzapine  Delirium precautions  PRN hydroxyzine for agitation   Check MRI brain  IP neurology consulted after ?seizure partial complex  s/p IV valium x 1 with resolution of alertness      Hypokalemia, POA- resolved  Lactic acidosis, POA  Lactic 2.20 > 0.97  Potassium 3.2   Replete potassium as needed       CAD with history of MI ()  Diastolic heart failure  History of DVT s/p IVC filter  Continue ASA     Right clavicle fracture, POA  Multiple right rib fractures, POA   CT chest abdomen pelvis - multiple subacute appearing right lateral rib fractures  CXR - acute right clavicle fracture, no evidence of acute cardiopulmonary process   Fall precautions  PT.OT eval noted-- back to [facility]

## 2025-06-21 NOTE — CASE COMMUNICATION
COMMUNICATION NOTE - Neurology Service    Name:  Josue Cruz     MRN: [id number]         Communication Note:   MRI brain - No acute intracranial abnormality.   CTA head and neck negative for Large vessel occlusion  Cardiac workup per primary team  I explained in detail about the current condition.   Rest of the management per primary team.  Neurology will sign off.   Please do not hesitate to call with questions or concerns.  Please let us know if we can provide any additional information.

## 2025-06-22 ENCOUNTER — APPOINTMENT (OUTPATIENT)
Facility: HOSPITAL | Age: 80
DRG: 312 | End: 2025-06-22
Payer: OTHER GOVERNMENT

## 2025-06-22 LAB
ECHO AR MAX VEL PISA: 2.2 M/S
ECHO AV MEAN GRADIENT: 24 MMHG
ECHO AV MEAN VELOCITY: 2.3 M/S
ECHO AV PEAK GRADIENT: 46 MMHG
ECHO AV PEAK VELOCITY: 3.4 M/S
ECHO AV REGURGITANT PHT: 388.6 MS
ECHO AV VELOCITY RATIO: 0.26
ECHO AV VTI: 69.8 CM
ECHO BSA: 2.4 M2
ECHO EST RA PRESSURE: 10 MMHG
ECHO LA DIAMETER INDEX: 1.32 CM/M2
ECHO LA DIAMETER: 3.1 CM
ECHO LA VOL A-L A2C: 50 ML (ref 18–58)
ECHO LA VOL A-L A4C: 66 ML (ref 18–58)
ECHO LA VOL MOD A2C: 46 ML (ref 18–58)
ECHO LA VOL MOD A4C: 56 ML (ref 18–58)
ECHO LA VOLUME AREA LENGTH: 67 ML
ECHO LA VOLUME INDEX A-L A2C: 21 ML/M2 (ref 16–34)
ECHO LA VOLUME INDEX A-L A4C: 28 ML/M2 (ref 16–34)
ECHO LA VOLUME INDEX AREA LENGTH: 29 ML/M2 (ref 16–34)
ECHO LA VOLUME INDEX MOD A2C: 20 ML/M2 (ref 16–34)
ECHO LA VOLUME INDEX MOD A4C: 24 ML/M2 (ref 16–34)
ECHO LV E' LATERAL VELOCITY: 5.83 CM/S
ECHO LV E' SEPTAL VELOCITY: 4.16 CM/S
ECHO LV EDV A4C: 109 ML
ECHO LV EDV INDEX A4C: 47 ML/M2
ECHO LV EF PHYSICIAN: 55 %
ECHO LV EJECTION FRACTION A4C: 53 %
ECHO LV ESV A4C: 51 ML
ECHO LV ESV INDEX A4C: 22 ML/M2
ECHO LV FRACTIONAL SHORTENING: 7 % (ref 28–44)
ECHO LV INTERNAL DIMENSION DIASTOLE INDEX: 1.84 CM/M2
ECHO LV INTERNAL DIMENSION DIASTOLIC: 4.3 CM (ref 4.2–5.9)
ECHO LV INTERNAL DIMENSION SYSTOLIC INDEX: 1.71 CM/M2
ECHO LV INTERNAL DIMENSION SYSTOLIC: 4 CM
ECHO LV IVSD: 1.1 CM (ref 0.6–1)
ECHO LV MASS 2D: 132.7 G (ref 88–224)
ECHO LV MASS INDEX 2D: 56.7 G/M2 (ref 49–115)
ECHO LV POSTERIOR WALL DIASTOLIC: 0.8 CM (ref 0.6–1)
ECHO LV RELATIVE WALL THICKNESS RATIO: 0.37
ECHO LVOT AV VTI INDEX: 0.28
ECHO LVOT MEAN GRADIENT: 1 MMHG
ECHO LVOT PEAK GRADIENT: 3 MMHG
ECHO LVOT PEAK VELOCITY: 0.9 M/S
ECHO LVOT VTI: 19.2 CM
ECHO MV A VELOCITY: 0.89 M/S
ECHO MV E DECELERATION TIME (DT): 219.5 MS
ECHO MV E VELOCITY: 0.64 M/S
ECHO MV E/A RATIO: 0.72
ECHO MV E/E' LATERAL: 10.98
ECHO MV E/E' RATIO (AVERAGED): 13.18
ECHO MV E/E' SEPTAL: 15.38
ECHO MV REGURGITANT PEAK GRADIENT: 36 MMHG
ECHO MV REGURGITANT PEAK VELOCITY: 3 M/S
ECHO PV MAX VELOCITY: 1 M/S
ECHO PV MEAN GRADIENT: 2 MMHG
ECHO PV MEAN VELOCITY: 0.6 M/S
ECHO PV PEAK GRADIENT: 4 MMHG
ECHO PV VTI: 17 CM
ECHO RIGHT VENTRICULAR SYSTOLIC PRESSURE (RVSP): 24 MMHG
ECHO RV FREE WALL PEAK S': 12.7 CM/S
ECHO RV INTERNAL DIMENSION: 3.4 CM
ECHO TV REGURGITANT MAX VELOCITY: 1.89 M/S
ECHO TV REGURGITANT PEAK GRADIENT: 17 MMHG
GLUCOSE BLD STRIP.AUTO-MCNC: 107 MG/DL (ref 65–117)
GLUCOSE BLD STRIP.AUTO-MCNC: 107 MG/DL (ref 65–117)
GLUCOSE BLD STRIP.AUTO-MCNC: 93 MG/DL (ref 65–117)
SERVICE CMNT-IMP: NORMAL

## 2025-06-22 PROCEDURE — 6370000000 HC RX 637 (ALT 250 FOR IP)

## 2025-06-22 PROCEDURE — 71045 X-RAY EXAM CHEST 1 VIEW: CPT

## 2025-06-22 PROCEDURE — 6370000000 HC RX 637 (ALT 250 FOR IP): Performed by: INTERNAL MEDICINE

## 2025-06-22 PROCEDURE — 82962 GLUCOSE BLOOD TEST: CPT

## 2025-06-22 PROCEDURE — 6360000002 HC RX W HCPCS: Performed by: INTERNAL MEDICINE

## 2025-06-22 PROCEDURE — 1100000003 HC PRIVATE W/ TELEMETRY

## 2025-06-22 PROCEDURE — 6360000002 HC RX W HCPCS

## 2025-06-22 PROCEDURE — 2500000003 HC RX 250 WO HCPCS

## 2025-06-22 RX ORDER — IPRATROPIUM BROMIDE AND ALBUTEROL SULFATE 2.5; .5 MG/3ML; MG/3ML
1 SOLUTION RESPIRATORY (INHALATION) EVERY 4 HOURS PRN
Status: CANCELLED | OUTPATIENT
Start: 2025-06-22

## 2025-06-22 RX ORDER — HALOPERIDOL 5 MG/ML
1 INJECTION INTRAMUSCULAR ONCE
Status: COMPLETED | OUTPATIENT
Start: 2025-06-22 | End: 2025-06-22

## 2025-06-22 RX ADMIN — MELATONIN 3 MG: at 19:16

## 2025-06-22 RX ADMIN — OXYCODONE HYDROCHLORIDE AND ACETAMINOPHEN 500 MG: 500 TABLET ORAL at 10:49

## 2025-06-22 RX ADMIN — PANTOPRAZOLE SODIUM 40 MG: 40 TABLET, DELAYED RELEASE ORAL at 10:49

## 2025-06-22 RX ADMIN — OLANZAPINE 2.5 MG: 5 TABLET, FILM COATED ORAL at 10:49

## 2025-06-22 RX ADMIN — Medication 1 TABLET: at 19:16

## 2025-06-22 RX ADMIN — Medication 1 TABLET: at 10:49

## 2025-06-22 RX ADMIN — MIDODRINE HYDROCHLORIDE 10 MG: 5 TABLET ORAL at 10:48

## 2025-06-22 RX ADMIN — CALCIUM CARBONATE (ANTACID) CHEW TAB 500 MG 500 MG: 500 CHEW TAB at 19:16

## 2025-06-22 RX ADMIN — OLANZAPINE 2.5 MG: 5 TABLET, FILM COATED ORAL at 19:16

## 2025-06-22 RX ADMIN — CALCIUM CARBONATE (ANTACID) CHEW TAB 500 MG 500 MG: 500 CHEW TAB at 10:53

## 2025-06-22 RX ADMIN — TRAZODONE HYDROCHLORIDE 50 MG: 50 TABLET ORAL at 19:16

## 2025-06-22 RX ADMIN — HYDROXYZINE HYDROCHLORIDE 25 MG: 50 INJECTION, SOLUTION INTRAMUSCULAR at 19:16

## 2025-06-22 RX ADMIN — ASPIRIN 81 MG: 81 TABLET, CHEWABLE ORAL at 10:49

## 2025-06-22 RX ADMIN — ZINC SULFATE 220 MG (50 MG) CAPSULE 50 MG: CAPSULE at 10:49

## 2025-06-22 RX ADMIN — DIVALPROEX SODIUM 125 MG: 125 CAPSULE, COATED PELLETS ORAL at 19:16

## 2025-06-22 RX ADMIN — HYDROXYZINE HYDROCHLORIDE 25 MG: 50 INJECTION, SOLUTION INTRAMUSCULAR at 12:14

## 2025-06-22 RX ADMIN — MIDODRINE HYDROCHLORIDE 10 MG: 5 TABLET ORAL at 14:21

## 2025-06-22 RX ADMIN — Medication 1 BOTTLE: at 10:45

## 2025-06-22 RX ADMIN — ENOXAPARIN SODIUM 30 MG: 100 INJECTION SUBCUTANEOUS at 19:26

## 2025-06-22 RX ADMIN — DIVALPROEX SODIUM 125 MG: 125 CAPSULE, COATED PELLETS ORAL at 10:49

## 2025-06-22 RX ADMIN — HALOPERIDOL LACTATE 1 MG: 5 INJECTION, SOLUTION INTRAMUSCULAR at 17:07

## 2025-06-22 RX ADMIN — SODIUM CHLORIDE, PRESERVATIVE FREE 10 ML: 5 INJECTION INTRAVENOUS at 11:02

## 2025-06-22 RX ADMIN — MICONAZOLE NITRATE: 20 CREAM TOPICAL at 19:26

## 2025-06-22 RX ADMIN — SODIUM CHLORIDE, PRESERVATIVE FREE 10 ML: 5 INJECTION INTRAVENOUS at 19:17

## 2025-06-22 RX ADMIN — ENOXAPARIN SODIUM 30 MG: 100 INJECTION SUBCUTANEOUS at 10:54

## 2025-06-22 ASSESSMENT — PAIN SCALES - GENERAL: PAINLEVEL_OUTOF10: 0

## 2025-06-22 NOTE — PROGRESS NOTES
End of Shift Note    Bedside shift change report given to CARMITA Serrano (oncoming nurse) by Rebecca Archuleta LPN (offgoing nurse).  Report included the following information SBAR    Shift worked:  0700 - 1930     Shift summary and any significant changes:    Pt had quite a bit of wheezing noted this morning on AM shift assessment. Message to MD - pt not in any distress and SpO2 97% on room air. No respiratory meds ordered. MD to the bedside to assess, CXR ordered.      IM Hydroxyzine given in the afternoon for increasing agitation. Pt became increasingly more agitated throughout the day, continues to try to get up out of bed, removing external catheter tubing, having hallucinations, not redirectable. Message to MD and one time dose of Haldol IV ordered/given - see MAR. Pt placed on tele per MD prior to giving Haldol.    Concerns for physician to address:       Zone phone for oncoming shift:   [phone]       Activity:  Level of Assistance: Maximum assist, patient does 25-49%  Number times ambulated in hallways past shift: 0  Number of times OOB to chair past shift: 0    Cardiac:   Cardiac Monitoring: Yes      Cardiac Rhythm: Sinus rhythm    Access:  Current line(s): PIV    Genitourinary:   Urinary Status: Voiding, External catheter    Respiratory:   O2 Device: None (Room air)  Chronic home O2 use?: NO  Incentive spirometer at bedside: N/A    GI:  Last BM (including prior to admit): 06/21/25  Current diet:  ADULT DIET; Regular; 3 carb choices (45 gm/meal); Low Fat/Low Chol/High Fiber/2 gm Na  Passing flatus: YES    Pain Management:   Patient states pain is manageable on current regimen: YES    Skin:  Erick Scale Score: 17  Interventions: Wound Offloading (Prevention Methods): Pillows, Repositioning    Patient Safety:  Fall Risk: Nursing Judgement-Fall Risk High(Add Comments): Yes  Fall Risk Interventions  Nursing Judgement-Fall Risk High(Add Comments): Yes  Toilet Every 2 Hours-In Advance of Need: Yes  Hourly Visual

## 2025-06-22 NOTE — PLAN OF CARE
Problem: Discharge Planning  Goal: Discharge to home or other facility with appropriate resources  Outcome: Progressing     Problem: Pain  Goal: Verbalizes/displays adequate comfort level or baseline comfort level  Outcome: Progressing     Problem: Skin/Tissue Integrity  Goal: Skin integrity remains intact  Description: 1.  Monitor for areas of redness and/or skin breakdown  2.  Assess vascular access sites hourly  3.  Every 4-6 hours minimum:  Change oxygen saturation probe site  4.  Every 4-6 hours:  If on nasal continuous positive airway pressure, respiratory therapy assess nares and determine need for appliance change or resting period  Outcome: Progressing  Flowsheets (Taken 6/21/2025 1939)  Skin Integrity Remains Intact: Monitor for areas of redness and/or skin breakdown     Problem: Safety - Adult  Goal: Free from fall injury  Outcome: Progressing     Problem: ABCDS Injury Assessment  Goal: Absence of physical injury  Outcome: Progressing

## 2025-06-22 NOTE — PROGRESS NOTES
Hospitalist Progress Note    NAME:   Josue Cruz   : 1945   MRN: [id number]     Date/Time: 2025 3:19 PM  Patient PCP: No primary care provider on file.    Estimated discharge date: ? , [facility]  Barriers:  Cardio clearance, Echo      Assessment / Plan:    Multiple syncopal events, POA  ?Symptomatic Moderate Aortic stenosis, POA  ? Partial Complex Seizure POA- RRT called  ,s/p Valium x 1 with resolution of unresponsiveness  U/A - unremarkable   CT head - no acute intracranial abnormality  CTA chest abdomen pelvis - ascending aortic aneurysm not significantly changed with no evidence for aortic dissection, bibasilar atelectasis with small pleural effusions, borderline enlarged mediastinal lymph nodes  Cont  telemetry  EEG ()= No clear focal process or epileptiform activity seen ,  IP Neurology consulted - following after episode of unresponsiveness with R sided twitching  PM during RRT ,  MRI Brain - pending  IP Cardiology consulted- following, TTE awaited  Carotid duplex neg study- only mild diseases noted  PT/OT eval noted- recommends back to [facility]/[place]  Patient wheezing today, start patient on DuoNeb nebulizer, follow-up chest x-ray     Dementia, POA  Mood disorder, POA  Risk for hospital-associated delirium   Continue divalproex, olanzapine  Delirium precautions  PRN hydroxyzine for agitation   Check MRI brain  IP neurology consulted after ?seizure partial complex  s/p IV valium x 1 with resolution of alertness      Hypokalemia, POA- resolved  Lactic acidosis, POA  Lactic 2.20 > 0.97  Potassium 3.2   Replete potassium as needed       CAD with history of MI ()  Diastolic heart failure  History of DVT s/p IVC filter  Continue ASA     Right clavicle fracture, POA  Multiple right rib fractures, POA   CT chest abdomen pelvis - multiple subacute appearing right lateral rib fractures  CXR - acute right clavicle fracture, no evidence of acute cardiopulmonary process   Fall

## 2025-06-23 VITALS
TEMPERATURE: 97.9 F | WEIGHT: 250 LBS | SYSTOLIC BLOOD PRESSURE: 128 MMHG | BODY MASS INDEX: 33.86 KG/M2 | RESPIRATION RATE: 17 BRPM | HEART RATE: 69 BPM | DIASTOLIC BLOOD PRESSURE: 65 MMHG | OXYGEN SATURATION: 98 % | HEIGHT: 72 IN

## 2025-06-23 LAB
GLUCOSE BLD STRIP.AUTO-MCNC: 86 MG/DL (ref 65–117)
GLUCOSE BLD STRIP.AUTO-MCNC: 89 MG/DL (ref 65–117)
SERVICE CMNT-IMP: NORMAL
SERVICE CMNT-IMP: NORMAL

## 2025-06-23 PROCEDURE — 82962 GLUCOSE BLOOD TEST: CPT

## 2025-06-23 PROCEDURE — 6360000002 HC RX W HCPCS

## 2025-06-23 PROCEDURE — 2500000003 HC RX 250 WO HCPCS

## 2025-06-23 PROCEDURE — 6370000000 HC RX 637 (ALT 250 FOR IP)

## 2025-06-23 RX ORDER — TRAZODONE HYDROCHLORIDE 50 MG/1
50 TABLET ORAL NIGHTLY
Qty: 30 TABLET | Refills: 0 | Status: SHIPPED | OUTPATIENT
Start: 2025-06-23

## 2025-06-23 RX ADMIN — DIVALPROEX SODIUM 125 MG: 125 CAPSULE, COATED PELLETS ORAL at 09:43

## 2025-06-23 RX ADMIN — SODIUM CHLORIDE, PRESERVATIVE FREE 10 ML: 5 INJECTION INTRAVENOUS at 09:54

## 2025-06-23 RX ADMIN — ENOXAPARIN SODIUM 30 MG: 100 INJECTION SUBCUTANEOUS at 09:44

## 2025-06-23 RX ADMIN — OXYCODONE HYDROCHLORIDE AND ACETAMINOPHEN 500 MG: 500 TABLET ORAL at 09:43

## 2025-06-23 RX ADMIN — MIDODRINE HYDROCHLORIDE 10 MG: 5 TABLET ORAL at 12:38

## 2025-06-23 RX ADMIN — Medication 1 TABLET: at 09:43

## 2025-06-23 RX ADMIN — MICONAZOLE NITRATE: 20 CREAM TOPICAL at 09:54

## 2025-06-23 RX ADMIN — ASPIRIN 81 MG: 81 TABLET, CHEWABLE ORAL at 09:43

## 2025-06-23 RX ADMIN — OLANZAPINE 2.5 MG: 5 TABLET, FILM COATED ORAL at 09:43

## 2025-06-23 RX ADMIN — PANTOPRAZOLE SODIUM 40 MG: 40 TABLET, DELAYED RELEASE ORAL at 06:49

## 2025-06-23 RX ADMIN — ZINC SULFATE 220 MG (50 MG) CAPSULE 50 MG: CAPSULE at 09:43

## 2025-06-23 RX ADMIN — CALCIUM CARBONATE (ANTACID) CHEW TAB 500 MG 500 MG: 500 CHEW TAB at 09:43

## 2025-06-23 ASSESSMENT — PAIN SCALES - GENERAL: PAINLEVEL_OUTOF10: 0

## 2025-06-23 NOTE — DISCHARGE SUMMARY
Discharge Summary    Name: Josue Cruz  [id number]  YOB: 1945 (Age: 79 y.o.)   Date of Admission: 6/18/2025  Date of Discharge: 6/23/2025  Attending Physician: Paul Gomez MD    Discharge Diagnosis:   Multiple syncopal events, POA  ?Symptomatic Moderate Aortic stenosis, POA- Echo noted to have stable Mod AS, EF 55%, Normal motion, cleared by cardiology  suspected Partial Complex Seizure POA- EEG negative, MRI negative acute except Severe Temporal Lobe Atrophy, cleared by Neurology  Dementia, POA  Mood disorder, POA  DNR    Consultations:  IP CONSULT TO HOSPITALIST  IP CONSULT TO CARDIOLOGY  IP CONSULT TO NEUROLOGY      Brief Admission History/Reason for Admission Per Reynaldo Verma MD:   \"79-year-old male with past medical history of dementia, coronary artery disease, aortic stenosis, GERD and iron deficiency anemia who presented to the ED on 6/18/25 with complaints of multiple syncopal events that started this morning at [facility]. Patient is a poor historian and is accompanied by his wife and daughter who provide most of the history. His daughter was present during the syncopal events and stated their were about six episodes ranging between 30 seconds and 3 minutes each. Patient with full return to baseline in-between these events. Family states he may have had a similar episode last month, at which time his wife believed he had just fallen asleep. He has been intermittently complaining of a headache that radiates from his forehead to the base of his skull. Denies dizziness, vision changes, numbness/tingling, chest pain, dyspnea, orthopnea, palpitations, nausea, or leg swelling. Of note, he takes aspirin daily. Patient had a fall about one month prior to arrival, where he sustained a displaced right clavicular fracture and multiple right rib fractures.      In the ED, patient remained hemodynamically stable and hypotensive (90s/50s). Sodium 142,

## 2025-06-23 NOTE — CARE COORDINATION
Care Management Initial Assessment       RUR: 18% Moderate Risk  Readmission? No  1st IM letter given? Yes   1st  letter given: No      Initial Assessment: Chart reviewed. CM met with pt at the bedside to introduce self and role. Verified contact information and demographics. Pt resides with spouse. Pt PCP is Dr. Jennings with last visit being within the last 6 months. Pt came from [facility]. Pt recently needs assistance with ADL's and uses a rollator to ambulate. Pt is not an active . ?Pts family to transport for discharge.    Pts spouse has declined pt to transfer to [facility]. Pts spouse would like pt to return to [facility] until [facility] can accept. CM will continue to follow.    Plan A: Rehab ([place])  Full assessment below:           06/20/25 3447   Service Assessment   Patient Orientation Other (see comment)  (Completed initial assessment with spouse)   Cognition Other (see comment)  (Completed initial assessment with spouse)   History Provided By Spouse   Primary Caregiver Other (Comment)   Support Systems Spouse/Significant Other   Patient's Healthcare Decision Maker is: Legal Next of Kin   PCP Verified by CM Yes   Last Visit to PCP Within last 6 months   Prior Functional Level Assistance with the following:   Current Functional Level Assistance with the following:   Can patient return to prior living arrangement Yes   Ability to make needs known: Good   Family able to assist with home care needs: Yes   Would you like for me to discuss the discharge plan with any other family members/significant others, and if so, who? Yes  (Mariann Cruz (Spouse)  [phone] ([place]))   Financial Resources Medicare   Social/Functional History   Lives With Spouse   Type of Home House   Home Layout One level   Home Equipment Wheelchair - Manual   Active  No   Discharge Planning   Type of Residence House   Current Services Prior To Admission Skilled 
Patient/Family:  Met with patient and family and they are agreeable to the transition plan. The Plan for Transition of Care is related to the following treatment goals:     The Patient and/or patient representative was provided with a choice of provider and agrees  with the discharge plan.      Yes [x] No []    A Freedom of choice list was provided with basic dialogue that supports the patient's individualized plan of care/goals and shares the quality data associated with the providers.       Yes [x] No []    SNF/Rehab Transition:  Patient has been accepted to LTC and meets criteria for admission.   Date of Inpatient Status Order:   Patient will transported by Family and expected to leave at 2:00PM.    Communication to SNF/Rehab:  Bedside RN, has been notified to update the transition plan to the facility and call report (phone number).  Discharge information has been updated on the AVS. And communicated to facility via [employer], or CC link.       Nursing Please include all hard scripts for controlled substances, med rec and dc summary, and AVS in packet.     Reviewed and confirmed with facility, Ramandeep can manage the patient care needs for the following:     Jay with (X) only those applicable:  Medication:  [x]Medications are available at the facility  []IV Antibiotics    []Controlled Substance - hard copies available sent.  []Weekly Labs    Equipment:  []CPAP/BiPAP  []Wound Vacuum  [][place] or Urinary Device  []PICC/Central Line  []Nebulizer  []Ventilator    Treatment:  []Isolation (for MRSA, VRE, etc.)  []Surgical Drain Management  []Tracheostomy Care  []Dressing Changes  []Dialysis with transportation  []PEG Care  []Oxygen  []Daily Weights for Heart Failure    Dietary:  []Any diet limitations  []Tube Feedings   []Total Parenteral Management (TPN)    Financial Resources:  []Medicaid Application Completed    []UAI Completed and copy given to pt/family  and copy given to pt/family  []A screening has

## 2025-06-23 NOTE — PROGRESS NOTES
End of Shift Note    Bedside shift change report given to Marycruz VIZCARRA (oncoming nurse) by Zach Mary RN (offgoing nurse).  Report included the following information SBAR, Intake/Output, MAR, Cardiac Rhythm NSR, and Alarm Parameters     Shift worked:  Night     Shift summary and any significant changes:     Patient restless and agitated during hand over, Given PRN dose of IM Hydroxyzine.  Patient calm and slept thereafter.   No significant event during shift. Plan of care ongoing.     Concerns for physician to address:       Zone phone for oncoming shift:          Activity:  Level of Assistance: Maximum assist, patient does 25-49%  Number times ambulated in hallways past shift: 0  Number of times OOB to chair past shift: 0    Cardiac:   Cardiac Monitoring: Yes      Cardiac Rhythm: Sinus rhythm    Access:  Current line(s): PIV     Genitourinary:   Urinary Status: Voiding, External catheter    Respiratory:   O2 Device: None (Room air)  Chronic home O2 use?: NO  Incentive spirometer at bedside: NO    GI:  Last BM (including prior to admit): 06/21/25  Current diet:  ADULT DIET; Regular; 3 carb choices (45 gm/meal); Low Fat/Low Chol/High Fiber/2 gm Na  Passing flatus: YES    Pain Management:   Patient states pain is manageable on current regimen: N/A    Skin:  Erick Scale Score: 17  Interventions: Wound Offloading (Prevention Methods): Pillows, Repositioning    Patient Safety:  Fall Risk: Nursing Judgement-Fall Risk High(Add Comments): Yes  Fall Risk Interventions  Nursing Judgement-Fall Risk High(Add Comments): Yes  Toilet Every 2 Hours-In Advance of Need: Yes  Hourly Visual Checks: Awake, In bed  Fall Visual Posted: Armband, Socks  Room Door Open: Yes  Alarm On: Bed  Patient Moved Closer to Nursing Station: No    Active Consults:   IP CONSULT TO HOSPITALIST  IP CONSULT TO CARDIOLOGY  IP CONSULT TO NEUROLOGY    Length of Stay:  Expected LOS: 5  Actual LOS: 5    Zach Mary RN

## 2025-06-23 NOTE — PLAN OF CARE
Problem: Discharge Planning  Goal: Discharge to home or other facility with appropriate resources  6/23/2025 0141 by Zach Mary RN  Outcome: Progressing  6/22/2025 1558 by Rebecca Archuleta LPN  Outcome: Progressing     Problem: Pain  Goal: Verbalizes/displays adequate comfort level or baseline comfort level  6/23/2025 0141 by Zach Mary RN  Outcome: Progressing  6/22/2025 1558 by Rebecca Archuleta LPN  Outcome: Progressing     Problem: Skin/Tissue Integrity  Goal: Skin integrity remains intact  Description: 1.  Monitor for areas of redness and/or skin breakdown  2.  Assess vascular access sites hourly  3.  Every 4-6 hours minimum:  Change oxygen saturation probe site  4.  Every 4-6 hours:  If on nasal continuous positive airway pressure, respiratory therapy assess nares and determine need for appliance change or resting period  6/23/2025 0141 by Zach Mary RN  Outcome: Progressing  6/22/2025 1558 by Rebecca Archuleta LPN  Outcome: Progressing     Problem: Safety - Adult  Goal: Free from fall injury  6/23/2025 0141 by Zach Mary RN  Outcome: Progressing  6/22/2025 1558 by Rebecca Archuleta LPN  Outcome: Progressing     Problem: ABCDS Injury Assessment  Goal: Absence of physical injury  6/23/2025 0141 by Zach Mary RN  Outcome: Progressing  6/22/2025 1558 by Rebecca Archuleta LPN  Outcome: Progressing

## 2025-06-23 NOTE — PLAN OF CARE
Problem: Discharge Planning  Goal: Discharge to home or other facility with appropriate resources  6/23/2025 1442 by Michelle Russ RN  Outcome: Adequate for Discharge  6/23/2025 1431 by Michelle Russ RN  Outcome: Adequate for Discharge  6/23/2025 0141 by Zach Mary RN  Outcome: Progressing     Problem: Pain  Goal: Verbalizes/displays adequate comfort level or baseline comfort level  6/23/2025 1442 by Michelle Russ RN  Outcome: Adequate for Discharge  6/23/2025 1431 by Michelle Russ RN  Outcome: Adequate for Discharge  6/23/2025 0141 by Zach Mary RN  Outcome: Progressing     Problem: Skin/Tissue Integrity  Goal: Skin integrity remains intact  Description: 1.  Monitor for areas of redness and/or skin breakdown  2.  Assess vascular access sites hourly  3.  Every 4-6 hours minimum:  Change oxygen saturation probe site  4.  Every 4-6 hours:  If on nasal continuous positive airway pressure, respiratory therapy assess nares and determine need for appliance change or resting period  6/23/2025 1442 by Michelle Russ RN  Outcome: Adequate for Discharge  6/23/2025 1431 by Michelle Russ RN  Outcome: Adequate for Discharge  6/23/2025 0141 by Zach Mary RN  Outcome: Progressing     Problem: Safety - Adult  Goal: Free from fall injury  6/23/2025 1442 by Michelle Russ RN  Outcome: Adequate for Discharge  6/23/2025 1431 by Michelle Russ RN  Outcome: Adequate for Discharge  6/23/2025 0141 by Zach Mary RN  Outcome: Progressing     Problem: ABCDS Injury Assessment  Goal: Absence of physical injury  6/23/2025 1442 by Michelle Russ RN  Outcome: Adequate for Discharge  6/23/2025 1431 by Michelle Russ RN  Outcome: Adequate for Discharge  6/23/2025 0141 by Zach Mary RN  Outcome: Progressing

## 2025-06-23 NOTE — DISCHARGE INSTRUCTIONS
Date/Time                                                                                                                                              Patient or Representative Signature                                                          Date/Time

## 2025-06-23 NOTE — PLAN OF CARE
Problem: Discharge Planning  Goal: Discharge to home or other facility with appropriate resources  6/23/2025 1431 by Michelle Russ RN  Outcome: Adequate for Discharge  6/23/2025 0141 by Zach Mary RN  Outcome: Progressing     Problem: Pain  Goal: Verbalizes/displays adequate comfort level or baseline comfort level  6/23/2025 1431 by Michelle Russ RN  Outcome: Adequate for Discharge  6/23/2025 0141 by Zach Mary RN  Outcome: Progressing     Problem: Skin/Tissue Integrity  Goal: Skin integrity remains intact  Description: 1.  Monitor for areas of redness and/or skin breakdown  2.  Assess vascular access sites hourly  3.  Every 4-6 hours minimum:  Change oxygen saturation probe site  4.  Every 4-6 hours:  If on nasal continuous positive airway pressure, respiratory therapy assess nares and determine need for appliance change or resting period  6/23/2025 1431 by Michelle Russ RN  Outcome: Adequate for Discharge  6/23/2025 0141 by Zach Mary RN  Outcome: Progressing     Problem: Safety - Adult  Goal: Free from fall injury  6/23/2025 1431 by Michelle Russ RN  Outcome: Adequate for Discharge  6/23/2025 0141 by Zach Mary RN  Outcome: Progressing     Problem: ABCDS Injury Assessment  Goal: Absence of physical injury  6/23/2025 1431 by Michelle Russ RN  Outcome: Adequate for Discharge  6/23/2025 0141 by Zach Mary RN  Outcome: Progressing

## 2025-07-06 LAB
EKG ATRIAL RATE: 71 BPM
EKG DIAGNOSIS: NORMAL
EKG P AXIS: 17 DEGREES
EKG P-R INTERVAL: 214 MS
EKG Q-T INTERVAL: 462 MS
EKG QRS DURATION: 98 MS
EKG QTC CALCULATION (BAZETT): 502 MS
EKG R AXIS: -41 DEGREES
EKG T AXIS: 18 DEGREES
EKG VENTRICULAR RATE: 71 BPM